# Patient Record
Sex: MALE | Race: WHITE | Employment: OTHER | URBAN - METROPOLITAN AREA
[De-identification: names, ages, dates, MRNs, and addresses within clinical notes are randomized per-mention and may not be internally consistent; named-entity substitution may affect disease eponyms.]

---

## 2017-12-20 ENCOUNTER — HOSPITAL ENCOUNTER (OUTPATIENT)
Dept: GENERAL RADIOLOGY | Age: 77
Discharge: HOME | End: 2017-12-20

## 2017-12-20 DIAGNOSIS — J40 BRONCHITIS: ICD-10-CM

## 2017-12-20 PROCEDURE — 71020 XR CHEST PA LAT: CPT

## 2018-01-29 ENCOUNTER — HOSPITAL ENCOUNTER (OUTPATIENT)
Dept: PREADMISSION TESTING | Age: 78
Discharge: HOME | End: 2018-01-29
Payer: MEDICARE

## 2018-01-29 VITALS
OXYGEN SATURATION: 98 % | DIASTOLIC BLOOD PRESSURE: 81 MMHG | HEIGHT: 65 IN | HEART RATE: 57 BPM | SYSTOLIC BLOOD PRESSURE: 156 MMHG | BODY MASS INDEX: 26.99 KG/M2 | TEMPERATURE: 98.4 F | WEIGHT: 162 LBS

## 2018-01-29 LAB
ABO + RH BLD: NORMAL
ANION GAP SERPL CALC-SCNC: 9 MMOL/L (ref 5–15)
APPEARANCE UR: CLEAR
ATRIAL RATE: 62 BPM
BACTERIA URNS QL MICRO: NEGATIVE /HPF
BILIRUB UR QL: NEGATIVE
BLOOD GROUP ANTIBODIES SERPL: NORMAL
BUN SERPL-MCNC: 18 MG/DL (ref 6–20)
BUN/CREAT SERPL: 14 (ref 12–20)
CALCIUM SERPL-MCNC: 9.6 MG/DL (ref 8.5–10.1)
CALCULATED P AXIS, ECG09: 53 DEGREES
CALCULATED R AXIS, ECG10: 44 DEGREES
CALCULATED T AXIS, ECG11: 51 DEGREES
CHLORIDE SERPL-SCNC: 105 MMOL/L (ref 97–108)
CO2 SERPL-SCNC: 28 MMOL/L (ref 21–32)
COLOR UR: NORMAL
CREAT SERPL-MCNC: 1.3 MG/DL (ref 0.7–1.3)
DIAGNOSIS, 93000: NORMAL
EPITH CASTS URNS QL MICRO: NORMAL /LPF
ERYTHROCYTE [DISTWIDTH] IN BLOOD BY AUTOMATED COUNT: 12.6 % (ref 11.5–14.5)
EST. AVERAGE GLUCOSE BLD GHB EST-MCNC: 114 MG/DL
GLUCOSE SERPL-MCNC: 95 MG/DL (ref 65–100)
GLUCOSE UR STRIP.AUTO-MCNC: NEGATIVE MG/DL
HBA1C MFR BLD: 5.6 % (ref 4.2–6.3)
HCT VFR BLD AUTO: 48.4 % (ref 36.6–50.3)
HGB BLD-MCNC: 16.6 G/DL (ref 12.1–17)
HGB UR QL STRIP: NEGATIVE
HYALINE CASTS URNS QL MICRO: NORMAL /LPF (ref 0–5)
INR PPP: 1.1 (ref 0.9–1.1)
KETONES UR QL STRIP.AUTO: NEGATIVE MG/DL
LEUKOCYTE ESTERASE UR QL STRIP.AUTO: NEGATIVE
MCH RBC QN AUTO: 31.6 PG (ref 26–34)
MCHC RBC AUTO-ENTMCNC: 34.3 G/DL (ref 30–36.5)
MCV RBC AUTO: 92.2 FL (ref 80–99)
NITRITE UR QL STRIP.AUTO: NEGATIVE
NRBC # BLD: 0 K/UL (ref 0–0.01)
NRBC BLD-RTO: 0 PER 100 WBC
P-R INTERVAL, ECG05: 160 MS
PH UR STRIP: 6 [PH] (ref 5–8)
PLATELET # BLD AUTO: 170 K/UL (ref 150–400)
PMV BLD AUTO: 10.7 FL (ref 8.9–12.9)
POTASSIUM SERPL-SCNC: 4 MMOL/L (ref 3.5–5.1)
PROT UR STRIP-MCNC: NEGATIVE MG/DL
PROTHROMBIN TIME: 10.8 SEC (ref 9–11.1)
Q-T INTERVAL, ECG07: 420 MS
QRS DURATION, ECG06: 90 MS
QTC CALCULATION (BEZET), ECG08: 426 MS
RBC # BLD AUTO: 5.25 M/UL (ref 4.1–5.7)
RBC #/AREA URNS HPF: NORMAL /HPF (ref 0–5)
SODIUM SERPL-SCNC: 142 MMOL/L (ref 136–145)
SP GR UR REFRACTOMETRY: 1.02 (ref 1–1.03)
SPECIMEN EXP DATE BLD: NORMAL
UA: UC IF INDICATED,UAUC: NORMAL
UROBILINOGEN UR QL STRIP.AUTO: 0.2 EU/DL (ref 0.2–1)
VENTRICULAR RATE, ECG03: 62 BPM
WBC # BLD AUTO: 4.8 K/UL (ref 4.1–11.1)
WBC URNS QL MICRO: NORMAL /HPF (ref 0–4)

## 2018-01-29 PROCEDURE — 86900 BLOOD TYPING SEROLOGIC ABO: CPT | Performed by: ORTHOPAEDIC SURGERY

## 2018-01-29 PROCEDURE — 85610 PROTHROMBIN TIME: CPT | Performed by: ORTHOPAEDIC SURGERY

## 2018-01-29 PROCEDURE — 83036 HEMOGLOBIN GLYCOSYLATED A1C: CPT | Performed by: ORTHOPAEDIC SURGERY

## 2018-01-29 PROCEDURE — 81001 URINALYSIS AUTO W/SCOPE: CPT | Performed by: ORTHOPAEDIC SURGERY

## 2018-01-29 PROCEDURE — 93005 ELECTROCARDIOGRAM TRACING: CPT

## 2018-01-29 PROCEDURE — 80048 BASIC METABOLIC PNL TOTAL CA: CPT | Performed by: ORTHOPAEDIC SURGERY

## 2018-01-29 PROCEDURE — 85027 COMPLETE CBC AUTOMATED: CPT | Performed by: ORTHOPAEDIC SURGERY

## 2018-01-29 NOTE — PERIOP NOTES
PRE-OPERATIVE INSTRUCTIONS REVIEWED WITH PATIENT. PATIENT GIVEN TWO  SIX-PACKS OF CHG WIPES. INSTRUCTIONS TO BE REVIEWED IN CLASS    ON USE OF CHG WIPES. PATIENT GIVEN SSI INFECTION SHEET. MRSA/MSSA TREATMENT INSTRUCTION SHEET GIVEN WITH AN EXPLANATION TO PATIENT THAT THEY WILL BE NOTIFIED IF TREATMENT INSTRUCTIONS NEED TO BE INITIATED. PATIENT WAS GIVEN THE OPPORTUNITY TO ASK QUESTIONS ON THE INFORMATION PROVIDED. PT HAS COPY OF \"[facility] [facility]\" FOR TOTAL KNEE. HE PLANS TO ATTEND JOINT CLASS TODAY.

## 2018-01-30 LAB
BACTERIA SPEC CULT: NORMAL
BACTERIA SPEC CULT: NORMAL
SERVICE CMNT-IMP: NORMAL

## 2018-02-09 RX ORDER — PREGABALIN 75 MG/1
75 CAPSULE ORAL ONCE
Status: CANCELLED | OUTPATIENT
Start: 2018-02-09 | End: 2018-02-09

## 2018-02-09 RX ORDER — CELECOXIB 200 MG/1
200 CAPSULE ORAL ONCE
Status: CANCELLED | OUTPATIENT
Start: 2018-02-09 | End: 2018-02-09

## 2018-02-09 RX ORDER — CEFAZOLIN SODIUM/WATER 2 G/20 ML
2 SYRINGE (ML) INTRAVENOUS ONCE
Status: CANCELLED | OUTPATIENT
Start: 2018-02-13 | End: 2018-02-13

## 2018-02-09 RX ORDER — ACETAMINOPHEN 500 MG
1000 TABLET ORAL ONCE
Status: CANCELLED | OUTPATIENT
Start: 2018-02-09 | End: 2018-02-09

## 2018-02-09 RX ORDER — DEXAMETHASONE SODIUM PHOSPHATE 10 MG/ML
4 INJECTION INTRAMUSCULAR; INTRAVENOUS ONCE
Status: CANCELLED | OUTPATIENT
Start: 2018-02-09 | End: 2018-02-10

## 2018-02-13 ENCOUNTER — ANESTHESIA (OUTPATIENT)
Dept: SURGERY | Age: 78
DRG: 470 | End: 2018-02-13
Payer: MEDICARE

## 2018-02-13 ENCOUNTER — ANESTHESIA EVENT (OUTPATIENT)
Dept: SURGERY | Age: 78
DRG: 470 | End: 2018-02-13
Payer: MEDICARE

## 2018-02-13 ENCOUNTER — HOSPITAL ENCOUNTER (INPATIENT)
Age: 78
LOS: 1 days | Discharge: HOME HEALTH | DRG: 470 | End: 2018-02-15
Attending: ORTHOPAEDIC SURGERY | Admitting: ORTHOPAEDIC SURGERY
Payer: MEDICARE

## 2018-02-13 DIAGNOSIS — M17.11 PRIMARY LOCALIZED OSTEOARTHRITIS OF RIGHT KNEE: Primary | ICD-10-CM

## 2018-02-13 LAB
ABO + RH BLD: NORMAL
BLOOD GROUP ANTIBODIES SERPL: NORMAL
GLUCOSE BLD STRIP.AUTO-MCNC: 94 MG/DL (ref 65–100)
SERVICE CMNT-IMP: NORMAL
SPECIMEN EXP DATE BLD: NORMAL

## 2018-02-13 PROCEDURE — 99218 HC RM OBSERVATION: CPT

## 2018-02-13 PROCEDURE — 76060000035 HC ANESTHESIA 2 TO 2.5 HR: Performed by: ORTHOPAEDIC SURGERY

## 2018-02-13 PROCEDURE — 76010000171 HC OR TIME 2 TO 2.5 HR INTENSV-TIER 1: Performed by: ORTHOPAEDIC SURGERY

## 2018-02-13 PROCEDURE — 74011250636 HC RX REV CODE- 250/636: Performed by: ORTHOPAEDIC SURGERY

## 2018-02-13 PROCEDURE — 77030020782 HC GWN BAIR PAWS FLX 3M -B

## 2018-02-13 PROCEDURE — 77030007866 HC KT SPN ANES BBMI -B: Performed by: ANESTHESIOLOGY

## 2018-02-13 PROCEDURE — 77030006802 HC BLD SAW RECIP BRSM -B: Performed by: ORTHOPAEDIC SURGERY

## 2018-02-13 PROCEDURE — 77030013079 HC BLNKT BAIR HGGR 3M -A: Performed by: ANESTHESIOLOGY

## 2018-02-13 PROCEDURE — 77030012935 HC DRSG AQUACEL BMS -B: Performed by: ORTHOPAEDIC SURGERY

## 2018-02-13 PROCEDURE — C1713 ANCHOR/SCREW BN/BN,TIS/BN: HCPCS | Performed by: ORTHOPAEDIC SURGERY

## 2018-02-13 PROCEDURE — 86900 BLOOD TYPING SEROLOGIC ABO: CPT | Performed by: ORTHOPAEDIC SURGERY

## 2018-02-13 PROCEDURE — 77030033067 HC SUT PDO STRATFX SPIR J&J -B: Performed by: ORTHOPAEDIC SURGERY

## 2018-02-13 PROCEDURE — 76210000006 HC OR PH I REC 0.5 TO 1 HR: Performed by: ORTHOPAEDIC SURGERY

## 2018-02-13 PROCEDURE — C1776 JOINT DEVICE (IMPLANTABLE): HCPCS | Performed by: ORTHOPAEDIC SURGERY

## 2018-02-13 PROCEDURE — 74011000250 HC RX REV CODE- 250

## 2018-02-13 PROCEDURE — 74011250636 HC RX REV CODE- 250/636: Performed by: ANESTHESIOLOGY

## 2018-02-13 PROCEDURE — C9290 INJ, BUPIVACAINE LIPOSOME: HCPCS | Performed by: ORTHOPAEDIC SURGERY

## 2018-02-13 PROCEDURE — 77030002933 HC SUT MCRYL J&J -A: Performed by: ORTHOPAEDIC SURGERY

## 2018-02-13 PROCEDURE — 97161 PT EVAL LOW COMPLEX 20 MIN: CPT

## 2018-02-13 PROCEDURE — 77030014077 HC TOWER MX CEM J&J -C: Performed by: ORTHOPAEDIC SURGERY

## 2018-02-13 PROCEDURE — 77030016547 HC BLD SAW SAG1 STRY -B: Performed by: ORTHOPAEDIC SURGERY

## 2018-02-13 PROCEDURE — 77030018836 HC SOL IRR NACL ICUM -A: Performed by: ORTHOPAEDIC SURGERY

## 2018-02-13 PROCEDURE — 77030011640 HC PAD GRND REM COVD -A: Performed by: ORTHOPAEDIC SURGERY

## 2018-02-13 PROCEDURE — 74011250636 HC RX REV CODE- 250/636

## 2018-02-13 PROCEDURE — 74011250637 HC RX REV CODE- 250/637: Performed by: ORTHOPAEDIC SURGERY

## 2018-02-13 PROCEDURE — 77030000032 HC CUF TRNQT ZIMM -B: Performed by: ORTHOPAEDIC SURGERY

## 2018-02-13 PROCEDURE — 36415 COLL VENOUS BLD VENIPUNCTURE: CPT | Performed by: ORTHOPAEDIC SURGERY

## 2018-02-13 PROCEDURE — 77030020365 HC SOL INJ SOD CL 0.9% 50ML: Performed by: ORTHOPAEDIC SURGERY

## 2018-02-13 PROCEDURE — 74011000258 HC RX REV CODE- 258: Performed by: ORTHOPAEDIC SURGERY

## 2018-02-13 PROCEDURE — 74011000258 HC RX REV CODE- 258

## 2018-02-13 PROCEDURE — 77030018822 HC SLV COMPR FT COVD -B

## 2018-02-13 PROCEDURE — 77030003601 HC NDL NRV BLK BBMI -A

## 2018-02-13 PROCEDURE — 77030010507 HC ADH SKN DERMBND J&J -B: Performed by: ORTHOPAEDIC SURGERY

## 2018-02-13 PROCEDURE — 77030020788: Performed by: ORTHOPAEDIC SURGERY

## 2018-02-13 PROCEDURE — 0SRC0L9 REPLACEMENT OF RIGHT KNEE JOINT WITH MEDIAL UNICONDYLAR SYNTHETIC SUBSTITUTE, CEMENTED, OPEN APPROACH: ICD-10-PCS | Performed by: ORTHOPAEDIC SURGERY

## 2018-02-13 PROCEDURE — 97116 GAIT TRAINING THERAPY: CPT

## 2018-02-13 PROCEDURE — 77030005515 HC CATH URETH FOL14 BARD -B: Performed by: ORTHOPAEDIC SURGERY

## 2018-02-13 PROCEDURE — 74011000250 HC RX REV CODE- 250: Performed by: ORTHOPAEDIC SURGERY

## 2018-02-13 PROCEDURE — 77030031139 HC SUT VCRL2 J&J -A: Performed by: ORTHOPAEDIC SURGERY

## 2018-02-13 PROCEDURE — 82962 GLUCOSE BLOOD TEST: CPT

## 2018-02-13 DEVICE — IMPLANTABLE DEVICE: Type: IMPLANTABLE DEVICE | Site: KNEE | Status: FUNCTIONAL

## 2018-02-13 DEVICE — COMPONENT TOT KNEE UPLR FEM PART: Type: IMPLANTABLE DEVICE | Status: FUNCTIONAL

## 2018-02-13 DEVICE — CEMENT BNE GENTAMICIN 40 GM SMARTSET GMV: Type: IMPLANTABLE DEVICE | Site: KNEE | Status: FUNCTIONAL

## 2018-02-13 RX ORDER — MIDAZOLAM HYDROCHLORIDE 1 MG/ML
0.5 INJECTION, SOLUTION INTRAMUSCULAR; INTRAVENOUS
Status: DISCONTINUED | OUTPATIENT
Start: 2018-02-13 | End: 2018-02-13 | Stop reason: HOSPADM

## 2018-02-13 RX ORDER — HYDROXYZINE HYDROCHLORIDE 10 MG/1
10 TABLET, FILM COATED ORAL
Status: DISCONTINUED | OUTPATIENT
Start: 2018-02-13 | End: 2018-02-15 | Stop reason: HOSPADM

## 2018-02-13 RX ORDER — AMOXICILLIN 250 MG
1 CAPSULE ORAL 2 TIMES DAILY
Status: DISCONTINUED | OUTPATIENT
Start: 2018-02-13 | End: 2018-02-15 | Stop reason: HOSPADM

## 2018-02-13 RX ORDER — OXYCODONE AND ACETAMINOPHEN 5; 325 MG/1; MG/1
1 TABLET ORAL AS NEEDED
Status: DISCONTINUED | OUTPATIENT
Start: 2018-02-13 | End: 2018-02-13 | Stop reason: HOSPADM

## 2018-02-13 RX ORDER — ASPIRIN 325 MG
325 TABLET, DELAYED RELEASE (ENTERIC COATED) ORAL 2 TIMES DAILY
Status: DISCONTINUED | OUTPATIENT
Start: 2018-02-13 | End: 2018-02-14

## 2018-02-13 RX ORDER — SODIUM CHLORIDE 0.9 % (FLUSH) 0.9 %
5-10 SYRINGE (ML) INJECTION AS NEEDED
Status: DISCONTINUED | OUTPATIENT
Start: 2018-02-13 | End: 2018-02-13 | Stop reason: HOSPADM

## 2018-02-13 RX ORDER — OXYCODONE HYDROCHLORIDE 5 MG/1
10 TABLET ORAL
Status: DISCONTINUED | OUTPATIENT
Start: 2018-02-13 | End: 2018-02-14

## 2018-02-13 RX ORDER — POTASSIUM CHLORIDE 750 MG/1
40 TABLET, FILM COATED, EXTENDED RELEASE ORAL DAILY
Status: DISCONTINUED | OUTPATIENT
Start: 2018-02-14 | End: 2018-02-15 | Stop reason: HOSPADM

## 2018-02-13 RX ORDER — FENTANYL CITRATE 50 UG/ML
50 INJECTION, SOLUTION INTRAMUSCULAR; INTRAVENOUS AS NEEDED
Status: DISCONTINUED | OUTPATIENT
Start: 2018-02-13 | End: 2018-02-13 | Stop reason: HOSPADM

## 2018-02-13 RX ORDER — OXYCODONE HYDROCHLORIDE 5 MG/1
5 TABLET ORAL
Status: DISCONTINUED | OUTPATIENT
Start: 2018-02-13 | End: 2018-02-14

## 2018-02-13 RX ORDER — CEFAZOLIN SODIUM/WATER 2 G/20 ML
2 SYRINGE (ML) INTRAVENOUS EVERY 8 HOURS
Status: COMPLETED | OUTPATIENT
Start: 2018-02-13 | End: 2018-02-14

## 2018-02-13 RX ORDER — ACETAMINOPHEN 500 MG
500 TABLET ORAL
Status: DISCONTINUED | OUTPATIENT
Start: 2018-02-13 | End: 2018-02-15 | Stop reason: HOSPADM

## 2018-02-13 RX ORDER — CELECOXIB 200 MG/1
200 CAPSULE ORAL ONCE
Status: COMPLETED | OUTPATIENT
Start: 2018-02-13 | End: 2018-02-13

## 2018-02-13 RX ORDER — HYDROMORPHONE HYDROCHLORIDE 1 MG/ML
0.5 INJECTION, SOLUTION INTRAMUSCULAR; INTRAVENOUS; SUBCUTANEOUS
Status: ACTIVE | OUTPATIENT
Start: 2018-02-13 | End: 2018-02-14

## 2018-02-13 RX ORDER — SODIUM CHLORIDE 0.9 % (FLUSH) 0.9 %
5-10 SYRINGE (ML) INJECTION AS NEEDED
Status: DISCONTINUED | OUTPATIENT
Start: 2018-02-13 | End: 2018-02-15 | Stop reason: HOSPADM

## 2018-02-13 RX ORDER — SODIUM CHLORIDE, SODIUM LACTATE, POTASSIUM CHLORIDE, CALCIUM CHLORIDE 600; 310; 30; 20 MG/100ML; MG/100ML; MG/100ML; MG/100ML
125 INJECTION, SOLUTION INTRAVENOUS CONTINUOUS
Status: DISCONTINUED | OUTPATIENT
Start: 2018-02-13 | End: 2018-02-13 | Stop reason: HOSPADM

## 2018-02-13 RX ORDER — MIDAZOLAM HYDROCHLORIDE 1 MG/ML
1 INJECTION, SOLUTION INTRAMUSCULAR; INTRAVENOUS AS NEEDED
Status: DISCONTINUED | OUTPATIENT
Start: 2018-02-13 | End: 2018-02-13 | Stop reason: HOSPADM

## 2018-02-13 RX ORDER — SODIUM CHLORIDE, SODIUM LACTATE, POTASSIUM CHLORIDE, CALCIUM CHLORIDE 600; 310; 30; 20 MG/100ML; MG/100ML; MG/100ML; MG/100ML
INJECTION, SOLUTION INTRAVENOUS
Status: DISCONTINUED | OUTPATIENT
Start: 2018-02-13 | End: 2018-02-13 | Stop reason: HOSPADM

## 2018-02-13 RX ORDER — PROPOFOL 10 MG/ML
INJECTION, EMULSION INTRAVENOUS
Status: DISCONTINUED | OUTPATIENT
Start: 2018-02-13 | End: 2018-02-13 | Stop reason: HOSPADM

## 2018-02-13 RX ORDER — MIDAZOLAM HYDROCHLORIDE 1 MG/ML
INJECTION, SOLUTION INTRAMUSCULAR; INTRAVENOUS AS NEEDED
Status: DISCONTINUED | OUTPATIENT
Start: 2018-02-13 | End: 2018-02-13 | Stop reason: HOSPADM

## 2018-02-13 RX ORDER — LIDOCAINE HYDROCHLORIDE 10 MG/ML
0.1 INJECTION, SOLUTION EPIDURAL; INFILTRATION; INTRACAUDAL; PERINEURAL AS NEEDED
Status: DISCONTINUED | OUTPATIENT
Start: 2018-02-13 | End: 2018-02-13 | Stop reason: HOSPADM

## 2018-02-13 RX ORDER — CEFAZOLIN SODIUM/WATER 2 G/20 ML
2 SYRINGE (ML) INTRAVENOUS ONCE
Status: COMPLETED | OUTPATIENT
Start: 2018-02-13 | End: 2018-02-13

## 2018-02-13 RX ORDER — ACETAMINOPHEN 325 MG/1
325 TABLET ORAL
Status: DISCONTINUED | OUTPATIENT
Start: 2018-02-14 | End: 2018-02-15 | Stop reason: HOSPADM

## 2018-02-13 RX ORDER — SODIUM CHLORIDE 9 MG/ML
50 INJECTION, SOLUTION INTRAVENOUS CONTINUOUS
Status: DISCONTINUED | OUTPATIENT
Start: 2018-02-13 | End: 2018-02-13 | Stop reason: HOSPADM

## 2018-02-13 RX ORDER — ATORVASTATIN CALCIUM 10 MG/1
10 TABLET, FILM COATED ORAL DAILY
Status: DISCONTINUED | OUTPATIENT
Start: 2018-02-14 | End: 2018-02-13

## 2018-02-13 RX ORDER — HYDROCHLOROTHIAZIDE 25 MG/1
25 TABLET ORAL DAILY
Status: DISCONTINUED | OUTPATIENT
Start: 2018-02-14 | End: 2018-02-15 | Stop reason: HOSPADM

## 2018-02-13 RX ORDER — SODIUM CHLORIDE 9 MG/ML
125 INJECTION, SOLUTION INTRAVENOUS CONTINUOUS
Status: DISPENSED | OUTPATIENT
Start: 2018-02-13 | End: 2018-02-14

## 2018-02-13 RX ORDER — FENTANYL CITRATE 50 UG/ML
25 INJECTION, SOLUTION INTRAMUSCULAR; INTRAVENOUS
Status: DISCONTINUED | OUTPATIENT
Start: 2018-02-13 | End: 2018-02-13 | Stop reason: HOSPADM

## 2018-02-13 RX ORDER — ONDANSETRON 2 MG/ML
4 INJECTION INTRAMUSCULAR; INTRAVENOUS
Status: DISPENSED | OUTPATIENT
Start: 2018-02-13 | End: 2018-02-14

## 2018-02-13 RX ORDER — PREGABALIN 75 MG/1
75 CAPSULE ORAL ONCE
Status: COMPLETED | OUTPATIENT
Start: 2018-02-13 | End: 2018-02-13

## 2018-02-13 RX ORDER — SODIUM CHLORIDE 0.9 % (FLUSH) 0.9 %
5-10 SYRINGE (ML) INJECTION EVERY 8 HOURS
Status: DISCONTINUED | OUTPATIENT
Start: 2018-02-14 | End: 2018-02-15 | Stop reason: HOSPADM

## 2018-02-13 RX ORDER — FACIAL-BODY WIPES
10 EACH TOPICAL DAILY PRN
Status: DISCONTINUED | OUTPATIENT
Start: 2018-02-15 | End: 2018-02-15 | Stop reason: HOSPADM

## 2018-02-13 RX ORDER — DIPHENHYDRAMINE HYDROCHLORIDE 50 MG/ML
12.5 INJECTION, SOLUTION INTRAMUSCULAR; INTRAVENOUS AS NEEDED
Status: DISCONTINUED | OUTPATIENT
Start: 2018-02-13 | End: 2018-02-13 | Stop reason: HOSPADM

## 2018-02-13 RX ORDER — NALOXONE HYDROCHLORIDE 0.4 MG/ML
0.4 INJECTION, SOLUTION INTRAMUSCULAR; INTRAVENOUS; SUBCUTANEOUS AS NEEDED
Status: DISCONTINUED | OUTPATIENT
Start: 2018-02-13 | End: 2018-02-15 | Stop reason: HOSPADM

## 2018-02-13 RX ORDER — POLYETHYLENE GLYCOL 3350 17 G/17G
17 POWDER, FOR SOLUTION ORAL DAILY
Status: DISCONTINUED | OUTPATIENT
Start: 2018-02-14 | End: 2018-02-15 | Stop reason: HOSPADM

## 2018-02-13 RX ORDER — FENTANYL CITRATE 50 UG/ML
INJECTION, SOLUTION INTRAMUSCULAR; INTRAVENOUS AS NEEDED
Status: DISCONTINUED | OUTPATIENT
Start: 2018-02-13 | End: 2018-02-13 | Stop reason: HOSPADM

## 2018-02-13 RX ORDER — HYDROCHLOROTHIAZIDE 25 MG/1
25 TABLET ORAL DAILY
Status: DISCONTINUED | OUTPATIENT
Start: 2018-02-14 | End: 2018-02-13

## 2018-02-13 RX ORDER — SODIUM CHLORIDE 0.9 % (FLUSH) 0.9 %
5-10 SYRINGE (ML) INJECTION EVERY 8 HOURS
Status: DISCONTINUED | OUTPATIENT
Start: 2018-02-13 | End: 2018-02-13 | Stop reason: HOSPADM

## 2018-02-13 RX ORDER — KETOROLAC TROMETHAMINE 30 MG/ML
15 INJECTION, SOLUTION INTRAMUSCULAR; INTRAVENOUS EVERY 6 HOURS
Status: DISCONTINUED | OUTPATIENT
Start: 2018-02-13 | End: 2018-02-14

## 2018-02-13 RX ORDER — SODIUM CHLORIDE 9 MG/ML
1000 INJECTION, SOLUTION INTRAVENOUS CONTINUOUS
Status: DISCONTINUED | OUTPATIENT
Start: 2018-02-13 | End: 2018-02-13 | Stop reason: HOSPADM

## 2018-02-13 RX ORDER — MORPHINE SULFATE 10 MG/ML
2 INJECTION, SOLUTION INTRAMUSCULAR; INTRAVENOUS
Status: DISCONTINUED | OUTPATIENT
Start: 2018-02-13 | End: 2018-02-13 | Stop reason: HOSPADM

## 2018-02-13 RX ORDER — ONDANSETRON 2 MG/ML
4 INJECTION INTRAMUSCULAR; INTRAVENOUS AS NEEDED
Status: DISCONTINUED | OUTPATIENT
Start: 2018-02-13 | End: 2018-02-13 | Stop reason: HOSPADM

## 2018-02-13 RX ORDER — AMLODIPINE BESYLATE 5 MG/1
10 TABLET ORAL DAILY
Status: DISCONTINUED | OUTPATIENT
Start: 2018-02-14 | End: 2018-02-15 | Stop reason: HOSPADM

## 2018-02-13 RX ORDER — ACETAMINOPHEN 500 MG
1000 TABLET ORAL ONCE
Status: COMPLETED | OUTPATIENT
Start: 2018-02-13 | End: 2018-02-13

## 2018-02-13 RX ORDER — POTASSIUM CHLORIDE 750 MG/1
40 TABLET, FILM COATED, EXTENDED RELEASE ORAL DAILY
Status: DISCONTINUED | OUTPATIENT
Start: 2018-02-14 | End: 2018-02-13

## 2018-02-13 RX ADMIN — Medication 2 G: at 07:40

## 2018-02-13 RX ADMIN — PROPOFOL 25 MCG/KG/MIN: 10 INJECTION, EMULSION INTRAVENOUS at 07:46

## 2018-02-13 RX ADMIN — MIDAZOLAM HYDROCHLORIDE 0.5 MG: 1 INJECTION, SOLUTION INTRAMUSCULAR; INTRAVENOUS at 07:50

## 2018-02-13 RX ADMIN — PREGABALIN 75 MG: 75 CAPSULE ORAL at 07:08

## 2018-02-13 RX ADMIN — ACETAMINOPHEN 500 MG: 500 TABLET, FILM COATED ORAL at 17:20

## 2018-02-13 RX ADMIN — MIDAZOLAM HYDROCHLORIDE 1 MG: 1 INJECTION, SOLUTION INTRAMUSCULAR; INTRAVENOUS at 07:33

## 2018-02-13 RX ADMIN — SODIUM CHLORIDE 125 ML/HR: 900 INJECTION, SOLUTION INTRAVENOUS at 10:35

## 2018-02-13 RX ADMIN — SODIUM CHLORIDE, SODIUM LACTATE, POTASSIUM CHLORIDE, AND CALCIUM CHLORIDE 125 ML/HR: 600; 310; 30; 20 INJECTION, SOLUTION INTRAVENOUS at 07:11

## 2018-02-13 RX ADMIN — ACETAMINOPHEN 1000 MG: 500 TABLET ORAL at 07:08

## 2018-02-13 RX ADMIN — KETOROLAC TROMETHAMINE 15 MG: 30 INJECTION, SOLUTION INTRAMUSCULAR at 16:06

## 2018-02-13 RX ADMIN — MIDAZOLAM HYDROCHLORIDE 1 MG: 1 INJECTION, SOLUTION INTRAMUSCULAR; INTRAVENOUS at 07:40

## 2018-02-13 RX ADMIN — SODIUM CHLORIDE, SODIUM LACTATE, POTASSIUM CHLORIDE, CALCIUM CHLORIDE: 600; 310; 30; 20 INJECTION, SOLUTION INTRAVENOUS at 07:33

## 2018-02-13 RX ADMIN — CELECOXIB 200 MG: 200 CAPSULE ORAL at 07:00

## 2018-02-13 RX ADMIN — FENTANYL CITRATE 25 MCG: 50 INJECTION, SOLUTION INTRAMUSCULAR; INTRAVENOUS at 07:33

## 2018-02-13 RX ADMIN — MIDAZOLAM HYDROCHLORIDE 1 MG: 1 INJECTION, SOLUTION INTRAMUSCULAR; INTRAVENOUS at 07:37

## 2018-02-13 RX ADMIN — FENTANYL CITRATE 50 MCG: 50 INJECTION, SOLUTION INTRAMUSCULAR; INTRAVENOUS at 07:22

## 2018-02-13 RX ADMIN — MIDAZOLAM HYDROCHLORIDE 2 MG: 1 INJECTION, SOLUTION INTRAMUSCULAR; INTRAVENOUS at 07:22

## 2018-02-13 RX ADMIN — DOCUSATE SODIUM AND SENNOSIDES 1 TABLET: 8.6; 5 TABLET, FILM COATED ORAL at 17:20

## 2018-02-13 RX ADMIN — SODIUM CHLORIDE, SODIUM LACTATE, POTASSIUM CHLORIDE, CALCIUM CHLORIDE: 600; 310; 30; 20 INJECTION, SOLUTION INTRAVENOUS at 08:51

## 2018-02-13 RX ADMIN — ACETAMINOPHEN 500 MG: 500 TABLET, FILM COATED ORAL at 22:53

## 2018-02-13 RX ADMIN — Medication 2 G: at 16:06

## 2018-02-13 RX ADMIN — ASPIRIN 325 MG: 325 TABLET, DELAYED RELEASE ORAL at 17:20

## 2018-02-13 RX ADMIN — KETOROLAC TROMETHAMINE 15 MG: 30 INJECTION, SOLUTION INTRAMUSCULAR at 22:53

## 2018-02-13 NOTE — ANESTHESIA PROCEDURE NOTES
Peripheral Block    Start time: 2/13/2018 7:24 AM  End time: 2/13/2018 7:30 AM  Performed by: TERESO Del Angel  Authorized by: TERESO Del Angel       Pre-procedure: Indications: at surgeon's request and post-op pain management    Preanesthetic Checklist: patient identified, risks and benefits discussed, site marked, timeout performed, anesthesia consent given and patient being monitored    Timeout Time: 07:24          Block Type:   Block Type:   Adductor canal  Laterality:  Right  Monitoring:  Standard ASA monitoring, continuous pulse ox, frequent vital sign checks, heart rate, responsive to questions and oxygen  Injection Technique:  Single shot  Procedures: ultrasound guided    Patient Position: supine  Prep: chlorhexidine    Location:  Mid thigh  Needle Type:  Stimuplex  Needle Gauge:  22 G  Needle Localization:  Ultrasound guidance  Medication Injected:  0.5%  ropivacaine  Volume (mL):  20    Assessment:  Number of attempts:  1  Injection Assessment:  Incremental injection every 5 mL, local visualized surrounding nerve on ultrasound, negative aspiration for blood, no paresthesia, no intravascular symptoms and ultrasound image on chart  Patient tolerance:  Patient tolerated the procedure well with no immediate complications

## 2018-02-13 NOTE — OP NOTES
[facility] OP NOTE    Jacklyn Sin  MR#: [id number]  : 1940  ACCOUNT #: [de-identified]   DATE OF SERVICE: 2018    SURGEON: Eladio Leal MD    ASSISTANT:  Dinora Shaw SA    PREOPERATIVE DIAGNOSIS:  Medial compartment osteoarthritis of the right knee. POSTOPERATIVE DIAGNOSIS:  Medial compartment osteoarthritis of the right knee. PROCEDURE PERFORMED: Right medial unicompartmental knee replacement. IMPLANTS: DePuy Sigma HP size 2 medial femur, size 4 tibial tray with 7 mm polyethylene insert. ANESTHESIA: Spinal sedation as well as adductor canal block. COMPLICATIONS:  None. ESTIMATED BLOOD LOSS:  50 mL. SPECIMENS REMOVED: None. INDICATIONS:  The patient is a 69-year-old gentleman with progressive right medial knee pain due to severe medial compartment arthritis. Symptoms have progressed despite comprehensive conservative treatment and he presents for right medial unicompartmental knee replacement versus total knee replacement. Risks, benefits, alternatives of procedure were reviewed with him in detail and he desires to proceed. PROCEDURE IN DETAIL:  Anesthesia team placed an adductor canal block before taking the patient to the operating room. We also placed a spinal. Preoperative IV antibiotics were administered. A Anders catheter was inserted and a padded pneumatic tourniquet was placed around the right upper thigh. Right lower extremity was prepped and draped in usual sterile fashion. Tourniquet was inflated to 275. Through a midline anterior knee incision from the medial parapatellar arthrotomy, the patella had grade I articular changes on the distal one-half. Femoral trochlea was completely normal.  The visualized portion on the lateral compartment was normal.  We proceeded with a medial unicompartmental replacement. Neutral proximal tibial resection was performed with an extramedullary alignment guide.   Care was taken to match the patient's native posterior slope. Using spacer blocks, flexion gap was satisfactory with the 7 mm block, and extension gap was satisfactory with an 8 mm block. I made the distal femoral cutting block with appropriate jig, under resecting distally by a millimeter. Femur was sized to a 2 and rotation for the cutting jig was based off of articulation with the center of the medial tibia in both 90 degrees of flexion and full extension. Femoral preparation was completed. Femoral trial was inserted and again the spacer block was utilized, satisfactory soft tissue tension and stability with a 7 mm spacer block in both flexion and extension, the knee had full extension to gravity. Remaining femoral osteophytes were removed. Meniscus had already been excised. Periarticular soft tissues were injected with a solution containing Exparel 0.5% Marcaine with epinephrine as well as morphine and Toradol. Tibial preparation was completed and bony surfaces were copiously irrigated with pulse lavage and dried before the real components were cemented into place using antibiotic-impregnated cement. Excess cement was removed with care taken to remove all posterior cement. Knee was reduced in full extension with the real insert in place. After adequate setup time, tourniquet was released and hemostasis obtained with the Bovie. The wound was irrigated. Arthrotomy was closed with a combination of heavy Vicryl sutures and a running #2 Stratafix suture. Skin and subcutaneous layers were closed in layered fashion with Vicryl and a running Monocryl subcuticular stitch. The wound was dressed with Dermabond and an Aquacel occlusive dressing as well as a sterile compressive dressing. The patient was transported to the postanesthesia care unit in stable condition. All counts were correct at the end of the procedure.       MD Lena Bonilla / GUI  D: 02/13/2018 09:38     T: 02/13/2018 12:19  JOB #: [id number]  CC: Zoltan Nam MD  CC: Surendra Bradford MD

## 2018-02-13 NOTE — PROGRESS NOTES
Bedside and Verbal shift change report given to Nicholas Kim (oncoming nurse) by Raymundo Macdonald (offgoing nurse). Report included the following information SBAR, Intake/Output and MAR.

## 2018-02-13 NOTE — ANESTHESIA PREPROCEDURE EVALUATION
Anesthetic History   No history of anesthetic complications            Review of Systems / Medical History  Patient summary reviewed, nursing notes reviewed and pertinent labs reviewed    Pulmonary  Within defined limits                 Neuro/Psych   Within defined limits           Cardiovascular  Within defined limits  Hypertension                   GI/Hepatic/Renal  Within defined limits              Endo/Other  Within defined limits           Other Findings              Physical Exam    Airway  Mallampati: II  TM Distance: > 6 cm  Neck ROM: normal range of motion   Mouth opening: Normal     Cardiovascular  Regular rate and rhythm,  S1 and S2 normal,  no murmur, click, rub, or gallop             Dental  No notable dental hx       Pulmonary  Breath sounds clear to auscultation               Abdominal  GI exam deferred       Other Findings            Anesthetic Plan    ASA: 2  Anesthesia type: spinal      Post-op pain plan if not by surgeon: peripheral nerve block single    Induction: Intravenous  Anesthetic plan and risks discussed with: Patient

## 2018-02-13 NOTE — DISCHARGE INSTRUCTIONS
After Hospital Care Plan:  Discharge Instructions Knee Replacement- Dr. Bebe Spatz    Patient Name: Renard Benson  Date of procedure: 2/13/2018   Procedure: Procedure(s):  RIGHT MEDIAL UNICOMPARTMENTAL KNEE ARTHROPLASTY  (SPINAL W/IVS)  Surgeon: Alfredo Mejia) and Role:     * Estuardo Holland MD - Primary  PCP: Chuy Lr MD  Date of discharge: No discharge date for patient encounter. Follow up appointments   Follow up with Dr. Bebe Spatz in 4 weeks. Call [phone] to make an appointment.  If home health has been arranged for you the agency will contact you to arrange dates/times for visits. Please call them if you do not hear from them within 24 hours after you are discharged    When to call your Orthopaedic Surgeon: Call [phone]. If you call after 5pm or on a weekend, the on call physician will be contacted   Unrelieved pain   Signs of infection-if your incision is red; continues to have drainage; drainage has a foul odor or if you have a persistent fever over 101 degrees   Signs of a blood clot in your leg-calf pain, tenderness, redness, swelling of lower leg    When to call your Primary Care Physician:   Concerns about medical conditions such as diabetes, high blood pressure, asthma, congestive heart failure   Call if blood sugars are elevated, persistent headache or dizziness, coughing or congestion, constipation or diarrhea, burning with urination, abnormal heart rate    When to call [id number] go to the nearest emergency room   Acute onset of chest pain, shortness of breath, difficulty breathing      Activity   Weight bearing as tolerated with walker or crutches.  Refer to pages 23-31 of your handbook for instructions and pictures   Complete your Home Exercise Program daily as instructed by your therapist.  Refer to pages 33-41 of your handbook for instructions and pictures   Get up every one hour and walk (except at night when sleeping)   Do not drive or operate heavy machinery      Incision Care   The Aquacel (brown, waterproof) surgical dressing is to remain on your knee for 7 days. On the 7th day have someone gently peel the dressing off by carefully lifting the edge and stretching it slightly to break the adhesive seal   If you have steri-strips (small, white pieces of tape) on your incision, they may come off when you remove the Aquacel surgical dressing. This is okay. You may now leave your incision open to air   If your Aquacel dressing comes loose/off before the 7th day, you may replace it with a dry sterile gauze dressing; change it daily. Once you incision is not draining, you may leave it open to air   You may take a shower with the Aquacel dressing in place. Once the Aquacel is removed, you may shower and get your incision wet but do not submerge your incision under water in a bath tub, hot tub or swimming pool for 6 weeks after surgery. Preventing blood clots    Take Enteric coated Aspirin (delayed release) one tablet twice a day for one month following surgery    Pain management   Take pain medication as prescribed with food & fluids; decrease the amount you use as your pain lessens   Avoid alcoholic beverages while taking pain medication   Please be aware that many medications contain Tylenol. We do not want you to over medicate so please read the information below as a guide. Do not take more than 3 Grams of Tylenol in a 24 hour period.   (There are 1000 milligrams in one Gram)   o Tylenol 325 mg per tablet (do not take more than 9 tablets in 24 hours)  o Tylenol 500 mg per tablet (do not take more than 6 tablets in 24 hours)  o Tylenol 650 mg per tablet (do not take more than 4 tablets in 24 hours)   Elevate your leg (do not bend/flex knee) and place ice bags on your knee for 15-20 minutes after exercising and as needed for comfort    Diet   Resume usual diet; drink plenty of fluids; eat foods high in fiber   You may want to take a stool softener (such as Senokot-S or Colace) to prevent constipation while you are taking pain medication. If constipation occurs, take a laxative (such as Dulcolax tablets, Milk of Magnesia, or a suppository)      [facility] Protocol (to be followed by [address])  Nursing-per physicians order   Complete head to toe assessment, vital signs   Medication reconciliation   Review pain management   Manage chronic medical conditions    Physical Therapy-per physicians order  Weight bearing status:  Precautions at Admission: Fall, WBAT          Mobility Status:  Supine to Sit: Modified independent  Sit to Stand: Stand-by asssistance  Sit to Supine: Stand-by asssistance     Gait:  Distance (ft): 250 Feet (ft)  Ambulation - Level of Assistance: Stand-by asssistance  Assistive Device: Walker, rolling, Gait belt  Gait Abnormalities: Antalgic, Decreased step clearance    Physical Therapy   Assessment and evaluation-bed mobility; functional transfers (bed, chair, bathroom, stairs); ambulation with equipment, car transfers, shower transfers, safety and ability to get out of house in the event of an emergency   Review weight bearing as tolerated, wean from walker or crutches as tolerated   Discuss pain management   Review how to do ADLs. Refer to page 42 of patient handbook    Home Exercise Program-refer to pages 33-41 of patient handbook for exercises.

## 2018-02-13 NOTE — IP AVS SNAPSHOT
[address] [facility] [address] [place] [address] 
[phone] Patient: Gianni Turcios MRN: [id number] :1940 About your hospitalization You were admitted on:  2018 You last received care in the:  [id number] [facility] You were discharged on:  February 15, 2018 Why you were hospitalized Your primary diagnosis was:  Not on File Your diagnoses also included:  Primary Localized Osteoarthritis Of Right Knee Follow-up Information Follow up With Details Comments Contact Info AT HOME CARE On 2/15/2018 skilled homecare for therapy [address] [place] [id number] 
[phone] Eladia Elizabeth MD   [facility] [address] [address] 
[phone] Discharge Orders None A check ebenezer indicates which time of day the medication should be taken. My Medications START taking these medications Instructions Each Dose to Equal  
 Morning Noon Evening Bedtime  
 acetaminophen 500 mg tablet Commonly known as:  TYLENOL Your last dose was: Your next dose is: Take 1 Tab by mouth every four (4) hours (while awake). Schedule for pain control over the next 7-14 days. Do not exceed 3000 mg in 24 hours. Obtain over-the-counter. Indications: Postoperative Incisional Knee Pain 500 mg  
    
   
   
   
  
 diclofenac EC 50 mg EC tablet Commonly known as:  VOLTAREN Your last dose was: Your next dose is: Take 1 Tab by mouth two (2) times daily as needed. Indications: OSTEOARTHRITIS, Postoperative Incisional Knee Pain 50 mg  
    
   
   
   
  
 oxyCODONE IR 5 mg immediate release tablet Commonly known as:  Carole Dirk Your last dose was: Your next dose is: Take 0.5-1 Tabs by mouth every four (4) hours as needed (Severe pain not relieved by tramadol (Ultram). Take with food & fluids. ).  Max Daily Amount: 30 mg. Indications: Severe Incisional Knee Pain 2.5-5 mg  
    
   
   
   
  
 traMADol 50 mg tablet Commonly known as:  ULTRAM  
   
Your last dose was: Your next dose is: Take 0.5-1 Tabs by mouth every six (6) hours as needed. Max Daily Amount: 200 mg. Indications: Postoperative Incisional Knee Pain 25-50 mg CHANGE how you take these medications Instructions Each Dose to Equal  
 Morning Noon Evening Bedtime  
 aspirin delayed-release 81 mg tablet Commonly known as:  [id number] What changed:   
- when to take this 
- additional instructions Your last dose was: Your next dose is: Take 1 Tab by mouth every twelve (12) hours. Take either Enteric Coated of Delayed Release Aspirin. May obtain over-the-counter. Indications: Prevention of Blood Clots after Total Knee Replacement 81 mg CONTINUE taking these medications Instructions Each Dose to Equal  
 Morning Noon Evening Bedtime  
 atorvastatin 10 mg tablet Commonly known as:  LIPITOR Your last dose was: Your next dose is: Take 10 mg by mouth daily. 10 mg  
    
   
   
   
  
 hydroCHLOROthiazide 25 mg tablet Commonly known as:  HYDRODIURIL Your last dose was: Your next dose is: Take 25 mg by mouth daily. 25 mg  
    
   
   
   
  
 NORVASC 10 mg tablet Generic drug:  amLODIPine Your last dose was: Your next dose is: Take 10 mg by mouth daily. 10 mg  
    
   
   
   
  
 potassium chloride 20 mEq tablet Commonly known as:  K-DUR, KLOR-CON Your last dose was: Your next dose is: Take 40 mEq by mouth daily. 40 mEq Where to Get Your Medications Information on where to get these meds will be given to you by the nurse or doctor. ! Ask your nurse or doctor about these medications  
  acetaminophen 500 mg tablet  
 aspirin delayed-release 81 mg tablet  
 diclofenac EC 50 mg EC tablet  
 oxyCODONE IR 5 mg immediate release tablet  
 traMADol 50 mg tablet Discharge Instructions After Hospital Care Plan:  Discharge Instructions Knee Replacement- Dr. Radha Brush Patient Name: Radha Shelton Date of procedure: 2/13/2018 Procedure: Procedure(s): RIGHT MEDIAL UNICOMPARTMENTAL KNEE ARTHROPLASTY  (SPINAL W/IVS) Surgeon: Surgeon(s) and Role: Thomas Sheppard MD - Primary PCP: Bonnie Barriga MD 
Date of discharge: No discharge date for patient encounter. Follow up appointments ? Follow up with Dr. Radha Brush in 4 weeks. Call [phone] to make an appointment. ? If home health has been arranged for you the agency will contact you to arrange dates/times for visits. Please call them if you do not hear from them within 24 hours after you are discharged When to call your Orthopaedic Surgeon: Call [phone]. If you call after 5pm or on a weekend, the on call physician will be contacted ? Unrelieved pain ? Signs of infection-if your incision is red; continues to have drainage; drainage has a foul odor or if you have a persistent fever over 101 degrees ? Signs of a blood clot in your leg-calf pain, tenderness, redness, swelling of lower leg When to call your Primary Care Physician: 
? Concerns about medical conditions such as diabetes, high blood pressure, asthma, congestive heart failure ? Call if blood sugars are elevated, persistent headache or dizziness, coughing or congestion, constipation or diarrhea, burning with urination, abnormal heart rate When to call [id number] go to the nearest emergency room ? Acute onset of chest pain, shortness of breath, difficulty breathing Activity ? Weight bearing as tolerated with walker or crutches. Refer to pages 23-31 of your handbook for instructions and pictures ? Complete your Home Exercise Program daily as instructed by your therapist.  Refer to pages 33-41 of your handbook for instructions and pictures ? Get up every one hour and walk (except at night when sleeping) ? Do not drive or operate heavy machinery Incision Care ? The Aquacel (brown, waterproof) surgical dressing is to remain on your knee for 7 days. On the 7th day have someone gently peel the dressing off by carefully lifting the edge and stretching it slightly to break the adhesive seal 
? If you have steri-strips (small, white pieces of tape) on your incision, they may come off when you remove the Aquacel surgical dressing. This is okay. You may now leave your incision open to air ? If your Aquacel dressing comes loose/off before the 7th day, you may replace it with a dry sterile gauze dressing; change it daily. Once you incision is not draining, you may leave it open to air ? You may take a shower with the Aquacel dressing in place. Once the Aquacel is removed, you may shower and get your incision wet but do not submerge your incision under water in a bath tub, hot tub or swimming pool for 6 weeks after surgery. Preventing blood clots ? Take Enteric coated Aspirin (delayed release) one tablet twice a day for one month following surgery Pain management ? Take pain medication as prescribed with food & fluids; decrease the amount you use as your pain lessens ? Avoid alcoholic beverages while taking pain medication ? Please be aware that many medications contain Tylenol. We do not want you to over medicate so please read the information below as a guide. Do not take more than 3 Grams of Tylenol in a 24 hour period. (There are 1000 milligrams in one Gram) o Tylenol 325 mg per tablet (do not take more than 9 tablets in 24 hours) o Tylenol 500 mg per tablet (do not take more than 6 tablets in 24 hours) o Tylenol 650 mg per tablet (do not take more than 4 tablets in 24 hours) ? Elevate your leg (do not bend/flex knee) and place ice bags on your knee for 15-20 minutes after exercising and as needed for comfort Diet ? Resume usual diet; drink plenty of fluids; eat foods high in fiber ? You may want to take a stool softener (such as Senokot-S or Colace) to prevent constipation while you are taking pain medication. If constipation occurs, take a laxative (such as Dulcolax tablets, Milk of Magnesia, or a suppository) Home Health Care Protocol (to be followed by [address]) Nursing-per physicians order ? Complete head to toe assessment, vital signs ? Medication reconciliation ? Review pain management ? Manage chronic medical conditions Physical Therapy-per physicians order Weight bearing status: 
Precautions at Admission: Fall, WBAT Mobility Status: 
Supine to Sit: Modified independent Sit to Stand: Stand-by asssistance Sit to Supine: Stand-by asssistance Gait: 
Distance (ft): 250 Feet (ft) Ambulation - Level of Assistance: Stand-by asssistance Assistive Device: Walker, rolling, Gait belt Gait Abnormalities: Antalgic, Decreased step clearance Physical Therapy ? Assessment and evaluation-bed mobility; functional transfers (bed, chair, bathroom, stairs); ambulation with equipment, car transfers, shower transfers, safety and ability to get out of house in the event of an emergency ? Review weight bearing as tolerated, wean from walker or crutches as tolerated ? Discuss pain management ? Review how to do ADLs. Refer to page 42 of patient handbook Home Exercise Program-refer to pages 33-41 of patient handbook for exercises. Introducing [facility]! [facility] introduces [employer] patient portal. Now you can access parts of your medical record, email your doctor's office, and request medication refills online. 1. In your internet browser, go to [email or web address] 2. Click on the First Time User? Click Here link in the Sign In box. You will see the New Member Sign Up page. 3. Enter your [employer] Access Code exactly as it appears below. You will not need to use this code after youve completed the sign-up process. If you do not sign up before the expiration date, you must request a new code. · [employer] Access Code: [facility] Expires: 4/29/2018  7:48 AM 
 
4. Enter the last four digits of your Social Security Number (xxxx) and Date of Birth (mm/dd/yyyy) as indicated and click Submit. You will be taken to the next sign-up page. 5. Create a [employer] ID. This will be your [employer] login ID and cannot be changed, so think of one that is secure and easy to remember. 6. Create a [employer] password. You can change your password at any time. 7. Enter your Password Reset Question and Answer. This can be used at a later time if you forget your password. 8. Enter your e-mail address. You will receive e-mail notification when new information is available in [address] 9. Click Sign Up. You can now view and download portions of your medical record. 10. Click the Download Summary menu link to download a portable copy of your medical information. If you have questions, please visit the Frequently Asked Questions section of the [employer] website. Remember, [employer] is NOT to be used for urgent needs. For medical emergencies, dial 911. Now available from your iPhone and Android! Providers Seen During Your Hospitalization Provider Specialty Primary office phone Roseanne Almeida MD Orthopedic Surgery [phone] Your Primary Care Physician (PCP) Primary Care Physician Office Phone Office Fax Karthikn beat [phone] You are allergic to the following Allergen Reactions Ace Inhibitors Cough Percocet (Oxycodone-Acetaminophen) Nausea and Vomiting \"I DON'T TOLERATE THE STRONG OPIODS\" Recent Documentation Height Weight BMI Smoking Status 1.651 m 73.5 kg 26.96 kg/m2 Never Smoker Emergency Contacts Name Discharge Info Relation Home Work Mobile Molly Pagan DISCHARGE CAREGIVER [3] Spouse [3] [phone] Patient Belongings The following personal items are in your possession at time of discharge: 
  Dental Appliances: None  Visual Aid: Glasses             Clothing:  (bag of clothing and eyeglasses returned to patient at bedsid)    Other Valuables: Eyeglasses Please provide this summary of care documentation to your next provider. Signatures-by signing, you are acknowledging that this After Visit Summary has been reviewed with you and you have received a copy. Patient Signature:  ____________________________________________________________ Date:  ____________________________________________________________  
  
[facility] Provider Signature:  ____________________________________________________________ Date:  ____________________________________________________________

## 2018-02-13 NOTE — IP AVS SNAPSHOT
[address] [facility] [address] 
[phone] Patient: Caty Rowland MRN: [id number] :1940 A check ebenezer indicates which time of day the medication should be taken. My Medications START taking these medications Instructions Each Dose to Equal  
 Morning Noon Evening Bedtime  
 acetaminophen 500 mg tablet Commonly known as:  TYLENOL Your last dose was: Your next dose is: Take 1 Tab by mouth every four (4) hours (while awake). Schedule for pain control over the next 7-14 days. Do not exceed 3000 mg in 24 hours. Obtain over-the-counter. Indications: Postoperative Incisional Knee Pain 500 mg  
    
   
   
   
  
 diclofenac EC 50 mg EC tablet Commonly known as:  VOLTAREN Your last dose was: Your next dose is: Take 1 Tab by mouth two (2) times daily as needed. Indications: OSTEOARTHRITIS, Postoperative Incisional Knee Pain 50 mg  
    
   
   
   
  
 oxyCODONE IR 5 mg immediate release tablet Commonly known as:  Lindsay Quispe Your last dose was: Your next dose is: Take 0.5-1 Tabs by mouth every four (4) hours as needed (Severe pain not relieved by tramadol (Ultram). Take with food & fluids. ). Max Daily Amount: 30 mg. Indications: Severe Incisional Knee Pain 2.5-5 mg  
    
   
   
   
  
 traMADol 50 mg tablet Commonly known as:  ULTRAM  
   
Your last dose was: Your next dose is: Take 0.5-1 Tabs by mouth every six (6) hours as needed. Max Daily Amount: 200 mg. Indications: Postoperative Incisional Knee Pain 25-50 mg CHANGE how you take these medications Instructions Each Dose to Equal  
 Morning Noon Evening Bedtime  
 aspirin delayed-release 81 mg tablet Commonly known as:  [id number] What changed:   
- when to take this 
- additional instructions Your last dose was: Your next dose is: Take 1 Tab by mouth every twelve (12) hours. Take either Enteric Coated of Delayed Release Aspirin. May obtain over-the-counter. Indications: Prevention of Blood Clots after Total Knee Replacement 81 mg CONTINUE taking these medications Instructions Each Dose to Equal  
 Morning Noon Evening Bedtime  
 atorvastatin 10 mg tablet Commonly known as:  LIPITOR Your last dose was: Your next dose is: Take 10 mg by mouth daily. 10 mg  
    
   
   
   
  
 hydroCHLOROthiazide 25 mg tablet Commonly known as:  HYDRODIURIL Your last dose was: Your next dose is: Take 25 mg by mouth daily. 25 mg  
    
   
   
   
  
 NORVASC 10 mg tablet Generic drug:  amLODIPine Your last dose was: Your next dose is: Take 10 mg by mouth daily. 10 mg  
    
   
   
   
  
 potassium chloride 20 mEq tablet Commonly known as:  K-DUR, KLOR-CON Your last dose was: Your next dose is: Take 40 mEq by mouth daily. 40 mEq Where to Get Your Medications Information on where to get these meds will be given to you by the nurse or doctor. ! Ask your nurse or doctor about these medications  
  acetaminophen 500 mg tablet  
 aspirin delayed-release 81 mg tablet  
 diclofenac EC 50 mg EC tablet  
 oxyCODONE IR 5 mg immediate release tablet  
 traMADol 50 mg tablet

## 2018-02-13 NOTE — PROGRESS NOTES
TRANSFER - IN REPORT:    Verbal report received from Phuong(name) on [address]  being received from [facility]) for routine post - op      Report consisted of patients Situation, Background, Assessment and   Recommendations(SBAR). Information from the following report(s) SBAR, Intake/Output and MAR was reviewed with the receiving nurse. Opportunity for questions and clarification was provided. Assessment completed upon patients arrival to unit and care assumed.

## 2018-02-13 NOTE — PROGRESS NOTES
Problem: Mobility Impaired (Adult and Pediatric)  Goal: *Acute Goals and Plan of Care (Insert Text)  Physical Therapy Goals  Initiated 2/13/2018    1. Patient will move from supine to sit and sit to supine , scoot up and down and roll side to side in bed with supervision/set-up within 4 days. 2. Patient will perform sit to stand with supervision/set-up within 4 days. 3. Patient will ambulate with minimal assistance/contact guard assist for 100 feet with the least restrictive device within 4 days. 4. Patient will ascend/descend 13 stairs with B handrail(s) with minimal assistance/contact guard assist within 4 days. 5. Patient will perform home exercise program per protocol with independence within 4 days. 6. Patient will demonstrate AROM 0-90 degrees in operative joint within 4 days. Outcome: Progressing Towards Goal  physical Therapy EVALUATION  Patient: Doc Bautista (3 y.o. male)  Date: 2/13/2018  Primary Diagnosis: OA RIGHT KNEE  Primary localized osteoarthritis of right knee  Procedure(s) (LRB):  RIGHT MEDIAL UNICOMPARTMENTAL KNEE ARTHROPLASTY  (SPINAL W/IVS) (Right) Day of Surgery   Precautions: WBAT      ASSESSMENT :    Based on the objective data described below, the patient presents with decreased independence with mobility compared to baseline level prior to admission due to decreased strength and ROM. Patient cleared by nursing for mobility and agreeable to participate in POD #0 mobility. Patient vital signs within normal limits. Patient able to perform bed mobility and achieve EOB sitting with supervision. Patient performed sit<>stand tx with min A and demonstrates even WB bilaterally and good immediate standing balance. Patient able to ambulate with CGA and RW x 10 feet. Patient returned to sitting and supine position in bed. Patient reportedly attended pre operative joint replacement education class. Physical therapist reviewed bed exercises and acute care expectations with patient.  Patient able to correctly perform ankle pumps, heel slides, and quad sets and has no additional questions. Patient has equipment needed for d/c. Will continue to follow to progress towards acute care goals. Recommend HHPT at d/c to continue to optimize independence with mobility. Patient will benefit from skilled intervention to address the above impairments. Patients rehabilitation potential is considered to be Excellent  Factors which may influence rehabilitation potential include:   []         None noted  []         Mental ability/status  [x]         Medical condition  []         Home/family situation and support systems  []         Safety awareness  []         Pain tolerance/management  []         Other:      PLAN :  Recommendations and Planned Interventions:  [x]           Bed Mobility Training             []    Neuromuscular Re-Education  [x]           Transfer Training                   []    Orthotic/Prosthetic Training  [x]           Gait Training                         []    Modalities  [x]           Therapeutic Exercises           []    Edema Management/Control  [x]           Therapeutic Activities            [x]    Patient and Family Training/Education  []           Other (comment):    Frequency/Duration: Patient will be followed by physical therapy  twice daily to address goals. Discharge Recommendations: Home Health  Further Equipment Recommendations for Discharge: rolling walker (Ordered 2/13/18)     SUBJECTIVE:   Patient stated Is that a rhetorical question?  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    Hypercholesterolemia     Hypertension     Personal history of kidney stones 1968    Prostatitis 2001    Psychiatric disorder     CLAUSTROPHOBIA; MASK OVER FACE WOULD CAUSE ANXIETY; HAS REACTED TO TIGHT SPACE (UNDER HOUSE)    Unspecified adverse effect of anesthesia     mother difficult to arouse post anesthesia     Past Surgical History:   Procedure Laterality Date    HX COLONOSCOPY      HX HERNIA REPAIR  6627    UMBILICAL    HX ORTHOPAEDIC Right 2000    ORIF ANKLE WITH PINS    HX ORTHOPAEDIC Right 2017    FOOT SURGERY    HX ORTHOPAEDIC Left 1980s    FOOT SURGERY     Prior Level of Function/Home Situation: Lives with wife.  Has a SPC  Personal factors and/or comorbidities impacting plan of care:     Home Situation  Home Environment: Private residence  # Steps to Enter: 3  Rails to Enter: Yes  Hand Rails : Bilateral  One/Two Story Residence: Two story  # of Interior Steps: 13  Living Alone: No  Support Systems: [employer] / [place] [employer], Family member(s), Spouse/Significant Other/Partner  Patient Expects to be Discharged to[employer] residence  Current DME Used/Available at [facility]: Cane, quad, Grab bars, Raised toilet seat  Tub or Shower Type: Shower    EXAMINATION/PRESENTATION/DECISION MAKING:   Critical Behavior:  Neurologic State: Alert  Orientation Level: Oriented X4  Cognition: Appropriate for age attention/concentration, Follows commands  Safety/Judgement: Awareness of environment    Range Of Motion:  AROM: Generally decreased, functional (RLE )           PROM: Generally decreased, functional (RLE)           Strength:    Strength: Generally decreased, functional (RLE)                    Tone & Sensation:   Tone: Normal              Sensation: Intact               Coordination:  Coordination: Generally decreased, functional  Vision:      Functional Mobility:  Bed Mobility:  Rolling: Stand-by asssistance  Supine to Sit: Stand-by asssistance  Sit to Supine: Stand-by asssistance     Transfers:  Sit to Stand: Minimum assistance  Stand to Sit: Minimum assistance                       Balance:   Sitting: Intact  Standing: Impaired  Standing - Static: Good  Standing - Dynamic : Fair  Ambulation/Gait Training:  Distance (ft): 10 Feet (ft)  Assistive Device: Walker, rolling  Ambulation - Level of Assistance: Contact guard assistance     Gait Description (WDL): Exceptions to WDL  Gait Abnormalities: Decreased step clearance        Base of Support: Narrowed     Speed/Louisa: Shuffled; Slow      Functional Measure:  Landrum Balance Test:    Sitting to Standing: 3  Standing Unsupported: 3  Sitting with Back Unsupported: 3  Standing to Sitting: 3  Transfers: 3  Standing Unsupported with Eyes Closed: 2  Standing Unsupported with Feet Together: 2  Reach Forward with Outstretched Arm: 3   Object: 1  Turn to Look Over Shoulders: 4  Turn 360 Degrees: 2  Alternate Foot on Step/Stool: 1  Standing Unsupported One Foot in Front: 0  Stand on One Le  Total: 30         56=Maximum possible score;   0-20=High fall risk  21-40=Moderate fall risk   41-56=Low fall risk     Landrum Balance Test and G-code impairment scale:  Percentage of Impairment CH    0%   CI    1-19% CJ    20-39% CK    40-59% CL    60-79% CM    80-99% CN     100%   Landrum   Score 0-56 56 45-55 34-44 23-33 12-22 1-11 0       G codes: In compliance with CMSs Claims Based Outcome Reporting, the following G-code set was chosen for this patient based on their primary functional limitation being treated: The outcome measure chosen to determine the severity of the functional limitation was the LANDRUM with a score of 30/56 which was correlated with the impairment scale.     ? Mobility - Walking and Moving Around:     - CURRENT STATUS: CK - 40%-59% impaired, limited or restricted    - GOAL STATUS: CJ - 20%-39% impaired, limited or restricted    - D/C STATUS:  ---------------To be determined---------------      Physical Therapy Evaluation Charge Determination   History Examination Presentation Decision-Making   MEDIUM  Complexity : 1-2 comorbidities / personal factors will impact the outcome/ POC  MEDIUM Complexity : 3 Standardized tests and measures addressing body structure, function, activity limitation and / or participation in recreation  LOW Complexity : Stable, uncomplicated  MEDIUM Complexity : FOTO score of 26-74      Based on the above components, the patient evaluation is determined to be of the following complexity level: LOW     Pain:  Pain Scale 1: Numeric (0 - 10)  Pain Intensity 1: 0  Pain Location 1: Knee  Pain Orientation 1: Right  Pain Description 1: Aching     Activity Tolerance:   VSS. Please refer to the flowsheet for vital signs taken during this treatment. After treatment:   []         Patient left in no apparent distress sitting up in chair  [x]         Patient left in no apparent distress in bed  [x]         Call bell left within reach  [x]         Nursing notified  [x]         Caregiver present  []         Bed alarm activated    COMMUNICATION/EDUCATION:   The patients plan of care was discussed with: Physical Therapist and Registered Nurse. [x]         Fall prevention education was provided and the patient/caregiver indicated understanding. [x]         Patient/family have participated as able in goal setting and plan of care. [x]         Patient/family agree to work toward stated goals and plan of care. []         Patient understands intent and goals of therapy, but is neutral about his/her participation. []         Patient is unable to participate in goal setting and plan of care. Thank you for this referral.  Mitzi Rowley, SPT   Time Calculation: 18 mins      Regarding student involvement in patient care:  A student participated in this treatment session. Per CMS Medicare statements and APTA guidelines I certify that the following was true:  1. I was present and directly observed the entire session. 2. I made all skilled judgments and clinical decisions regarding care. 3. I am the practitioner responsible for assessment, treatment, and documentation.

## 2018-02-13 NOTE — PROGRESS NOTES
Primary Nurse Karely Higgins and Gabe Kwong, RN performed a dual skin assessment on this patient No impairment noted  Den score is 18

## 2018-02-13 NOTE — PROGRESS NOTES
This patient is NOT a bundle due to Observation status. KJT    The patient was upgraded to inpatient. The patient is now a bundle.  kJT

## 2018-02-13 NOTE — ANESTHESIA PROCEDURE NOTES
Spinal Block    Start time: 2/13/2018 7:33 AM  End time: 2/13/2018 7:40 AM  Performed by: TERESO Platt  Authorized by: TERESO Platt     Pre-procedure:   Indications: primary anesthetic  Preanesthetic Checklist: patient identified, risks and benefits discussed, anesthesia consent, site marked, patient being monitored and timeout performed      Spinal Block:   Patient Position:  Seated  Prep Region:  Lumbar  Prep: Betadine      Location:  L3-4  Technique:  Single shot        Needle:   Needle Type:  Pencil-tip  Needle Gauge:  24 G  Attempts:  1      Events: CSF confirmed, no blood with aspiration and no paresthesia        Assessment:  Insertion:  Uncomplicated  Patient tolerance:  Patient tolerated the procedure well with no immediate complications

## 2018-02-13 NOTE — ANESTHESIA POSTPROCEDURE EVALUATION
Post-Anesthesia Evaluation and Assessment    Patient: Stephanie Lord MRN: [id number]  SSN: [id number]    YOB: 1940  Age: 65 y.o. Sex: male       Cardiovascular Function/Vital Signs  Visit Vitals    /60    Pulse (!) 58    Temp 36.1 °C (97 °F)    Resp 16    Ht 5' 5\" (1.651 m)    Wt 73.5 kg (162 lb)    SpO2 94%    BMI 26.96 kg/m2       Patient is status post spinal anesthesia for Procedure(s):  RIGHT MEDIAL UNICOMPARTMENTAL KNEE ARTHROPLASTY  (SPINAL W/IVS). Nausea/Vomiting: None    Postoperative hydration reviewed and adequate. Pain:  Pain Scale 1: Numeric (0 - 10) (02/13/18 1000)  Pain Intensity 1: 0 (02/13/18 1000)   Managed    Neurological Status:   Neuro (WDL): Exceptions to WDL (02/13/18 1000)  Neuro  Orientation Level: Oriented to person;Oriented to place;Oriented to situation;Disoriented to time (02/13/18 1000)  Cognition: Appropriate for age attention/concentration; Appropriate safety awareness; Follows commands (02/13/18 1000)  Speech: Appropriate for age;Clear (02/13/18 1000)  LUE Motor Response: Purposeful (02/13/18 1000)  LLE Motor Response: Purposeful (02/13/18 1000)  RUE Motor Response: Purposeful (02/13/18 1000)  RLE Motor Response: Purposeful (02/13/18 1000)   At baseline    Mental Status and Level of Consciousness: Arousable    Pulmonary Status:   O2 Device: Nasal cannula (02/13/18 1006)   Adequate oxygenation and airway patent    Complications related to anesthesia: None    Post-anesthesia assessment completed.  No concerns    Signed By: Yandel Jennings MD     February 13, 2018

## 2018-02-13 NOTE — PERIOP NOTES
TRANSFER - OUT REPORT:    Verbal report given to Terrance Ruvalcaba RN on Kedar Francois  being transferred to [address] [facility] 6[facility] for routine post - op       Report consisted of patients Situation, Background, Assessment and   Recommendations(SBAR). Time Pre op antibiotic given:0740  Anesthesia Stop time: 8195  Anders Present on Transfer to floor:yes  Order for Anders on Chart:yes  Discharge Prescriptions with Chart:n/a     Information from the following report(s) SBAR, Kardex, OR Summary, Procedure Summary, Intake/Output and MAR was reviewed with the receiving nurse. Opportunity for questions and clarification was provided. Is the patient on 02? YES       L/Min 2       Other     Is the patient on a monitor? NO    Is the nurse transporting with the patient? NO    Surgical Waiting Area notified of patient's transfer from PACU? YES      The following personal items collected during your admission accompanied patient upon transfer:   Dental Appliance: Dental Appliances: None  Vision: Visual Aid: Glasses  Hearing Aid:    Jewelry:    Clothing: Clothing:  (bag of clothing and eyeglasses returned to patient at bedsid)  Other Valuables:  Other Valuables: Eyeglasses  Valuables sent to safe:

## 2018-02-13 NOTE — PROGRESS NOTES
Problem: Falls - Risk of  Goal: *Absence of Falls  Document Jennifer Fall Risk and appropriate interventions in the flowsheet.    Outcome: Progressing Towards Goal  Fall Risk Interventions:  Mobility Interventions: PT Consult for mobility concerns, PT Consult for assist device competence, Patient to call before getting OOB         Medication Interventions: Bed/chair exit alarm, Patient to call before getting OOB, Teach patient to arise slowly    Elimination Interventions: Call light in reach, Patient to call for help with toileting needs, Toilet paper/wipes in reach, Toileting schedule/hourly rounds

## 2018-02-13 NOTE — PERIOP NOTES
Patient: Uma Carr MRN: [id number]  SSN: [id number]   YOB: 1940  Age: 65 y.o. Sex: male     Patient is status post Procedure(s):  RIGHT MEDIAL UNICOMPARTMENTAL KNEE ARTHROPLASTY  (SPINAL W/IVS). Surgeon(s) and Role:     * Frances Cooper MD - Primary    Local/Dose/Irrigation:  SEE MAR                  Peripheral IV 02/13/18 Left Hand (Active)                           Dressing/Packing:  Wound Knee Right-DRESSING TYPE: Cast padding;Elastic bandage; Topical skin adhesive/glue; Other (Comment) (AQUACEL) (02/13/18 0900)  Splint/Cast:  ]    Other:  16 FR URIBE CATHETER

## 2018-02-13 NOTE — BRIEF OP NOTE
BRIEF OPERATIVE NOTE    Date of Procedure: 2/13/2018   Preoperative Diagnosis: OA RIGHT KNEE  Postoperative Diagnosis: OA RIGHT KNEE    Procedure(s):  RIGHT MEDIAL UNICOMPARTMENTAL KNEE ARTHROPLASTY  (SPINAL W/IVS)  Surgeon(s) and Role:     * Aquilino Torres MD - Primary         Assistant Staff: None      Surgical Staff:  Circ-1: Carlin Cai  Circ-Relief: Lori Kemp RN  Scrub RN-1: Gracie Cho RN  Surg Asst-1: Cydney Cantu  Surg Asst-2: Viva Bahai  Event Time In   Incision Start 2442   Incision Close      Anesthesia: Spinal   Estimated Blood Loss: 50  Specimens: * No specimens in log *   Findings: severe med compartment djd; other compartments normal   Complications: none  Implants:   Implant Name Type Inv.  Item Serial No.  Lot No. LRB No. Used Action   CEMENT BNE GENTAMC MV 40GM -- SMARTSET ENDURANCE - SNA  CEMENT BNE GENTAMC MV 40GM -- SMARTSET ENDURANCE NA [facility] [employer] [id number] Right 1 Implanted   FEM UNI HP SIGMA SZ2 RM/LL --  - SNA  FEM UNI HP SIGMA SZ2 RM/LL --  NA [facility] [employer] [id number] Right 1 Implanted   BASEPLT TIB UNI HP SZ4 RMLL -- SIGMA - SNA  BASEPLT TIB UNI HP SZ4 RMLL -- SIGMA NA [facility] [employer] [id number] Right 1 Implanted   INSERT UNI HP SZ4 7MM RM/LL -- SIGMA - SNA   INSERT UNI HP SZ4 7MM RM/LL -- SIGMA NA [facility] [employer] [id number] Right 1 Implanted

## 2018-02-14 LAB
ANION GAP SERPL CALC-SCNC: 8 MMOL/L (ref 5–15)
BUN SERPL-MCNC: 11 MG/DL (ref 6–20)
BUN/CREAT SERPL: 11 (ref 12–20)
CALCIUM SERPL-MCNC: 8.4 MG/DL (ref 8.5–10.1)
CHLORIDE SERPL-SCNC: 110 MMOL/L (ref 97–108)
CO2 SERPL-SCNC: 26 MMOL/L (ref 21–32)
CREAT SERPL-MCNC: 1 MG/DL (ref 0.7–1.3)
GLUCOSE SERPL-MCNC: 89 MG/DL (ref 65–100)
HGB BLD-MCNC: 13.6 G/DL (ref 12.1–17)
POTASSIUM SERPL-SCNC: 3.3 MMOL/L (ref 3.5–5.1)
SODIUM SERPL-SCNC: 144 MMOL/L (ref 136–145)

## 2018-02-14 PROCEDURE — 65270000029 HC RM PRIVATE

## 2018-02-14 PROCEDURE — 85018 HEMOGLOBIN: CPT | Performed by: ORTHOPAEDIC SURGERY

## 2018-02-14 PROCEDURE — 74011250637 HC RX REV CODE- 250/637: Performed by: NURSE PRACTITIONER

## 2018-02-14 PROCEDURE — 80048 BASIC METABOLIC PNL TOTAL CA: CPT | Performed by: ORTHOPAEDIC SURGERY

## 2018-02-14 PROCEDURE — 36415 COLL VENOUS BLD VENIPUNCTURE: CPT | Performed by: ORTHOPAEDIC SURGERY

## 2018-02-14 PROCEDURE — 97116 GAIT TRAINING THERAPY: CPT

## 2018-02-14 PROCEDURE — 74011250636 HC RX REV CODE- 250/636: Performed by: ORTHOPAEDIC SURGERY

## 2018-02-14 PROCEDURE — 97110 THERAPEUTIC EXERCISES: CPT

## 2018-02-14 PROCEDURE — 99218 HC RM OBSERVATION: CPT

## 2018-02-14 PROCEDURE — 74011250637 HC RX REV CODE- 250/637: Performed by: ORTHOPAEDIC SURGERY

## 2018-02-14 RX ORDER — DICLOFENAC SODIUM 50 MG/1
50 TABLET, DELAYED RELEASE ORAL 2 TIMES DAILY
Status: DISCONTINUED | OUTPATIENT
Start: 2018-02-14 | End: 2018-02-15 | Stop reason: HOSPADM

## 2018-02-14 RX ORDER — TRAMADOL HYDROCHLORIDE 50 MG/1
25 TABLET ORAL
Status: DISCONTINUED | OUTPATIENT
Start: 2018-02-14 | End: 2018-02-15 | Stop reason: HOSPADM

## 2018-02-14 RX ORDER — ONDANSETRON 4 MG/1
4 TABLET, ORALLY DISINTEGRATING ORAL
Status: DISCONTINUED | OUTPATIENT
Start: 2018-02-14 | End: 2018-02-15 | Stop reason: HOSPADM

## 2018-02-14 RX ORDER — TRAMADOL HYDROCHLORIDE 50 MG/1
50 TABLET ORAL
Status: DISCONTINUED | OUTPATIENT
Start: 2018-02-14 | End: 2018-02-15 | Stop reason: HOSPADM

## 2018-02-14 RX ORDER — ASPIRIN 81 MG/1
81 TABLET ORAL 2 TIMES DAILY
Status: DISCONTINUED | OUTPATIENT
Start: 2018-02-14 | End: 2018-02-15 | Stop reason: HOSPADM

## 2018-02-14 RX ORDER — DICLOFENAC SODIUM 75 MG/1
75 TABLET, DELAYED RELEASE ORAL 2 TIMES DAILY
Status: DISCONTINUED | OUTPATIENT
Start: 2018-02-14 | End: 2018-02-14

## 2018-02-14 RX ORDER — OXYCODONE HYDROCHLORIDE 5 MG/1
2.5-5 TABLET ORAL
Status: DISCONTINUED | OUTPATIENT
Start: 2018-02-14 | End: 2018-02-15 | Stop reason: HOSPADM

## 2018-02-14 RX ORDER — FAMOTIDINE 20 MG/1
20 TABLET, FILM COATED ORAL DAILY
Status: DISCONTINUED | OUTPATIENT
Start: 2018-02-14 | End: 2018-02-15 | Stop reason: HOSPADM

## 2018-02-14 RX ADMIN — ASPIRIN 81 MG: 81 TABLET, COATED ORAL at 08:51

## 2018-02-14 RX ADMIN — DICLOFENAC SODIUM 75 MG: 75 TABLET, DELAYED RELEASE ORAL at 08:51

## 2018-02-14 RX ADMIN — ASPIRIN 81 MG: 81 TABLET, COATED ORAL at 17:43

## 2018-02-14 RX ADMIN — TRAMADOL HYDROCHLORIDE 50 MG: 50 TABLET, FILM COATED ORAL at 20:04

## 2018-02-14 RX ADMIN — ACETAMINOPHEN 500 MG: 500 TABLET, FILM COATED ORAL at 13:47

## 2018-02-14 RX ADMIN — KETOROLAC TROMETHAMINE 15 MG: 30 INJECTION, SOLUTION INTRAMUSCULAR at 04:51

## 2018-02-14 RX ADMIN — FAMOTIDINE 20 MG: 20 TABLET, FILM COATED ORAL at 11:00

## 2018-02-14 RX ADMIN — Medication 10 ML: at 04:51

## 2018-02-14 RX ADMIN — Medication 10 ML: at 13:47

## 2018-02-14 RX ADMIN — ACETAMINOPHEN 500 MG: 500 TABLET, FILM COATED ORAL at 20:04

## 2018-02-14 RX ADMIN — ACETAMINOPHEN 500 MG: 500 TABLET, FILM COATED ORAL at 17:43

## 2018-02-14 RX ADMIN — Medication 2 G: at 00:21

## 2018-02-14 RX ADMIN — POLYETHYLENE GLYCOL 3350 17 G: 17 POWDER, FOR SOLUTION ORAL at 08:51

## 2018-02-14 RX ADMIN — HYDROCHLOROTHIAZIDE 25 MG: 25 TABLET ORAL at 13:47

## 2018-02-14 RX ADMIN — Medication 10 ML: at 22:00

## 2018-02-14 RX ADMIN — POTASSIUM CHLORIDE 40 MEQ: 750 TABLET, EXTENDED RELEASE ORAL at 13:47

## 2018-02-14 RX ADMIN — DOCUSATE SODIUM AND SENNOSIDES 1 TABLET: 8.6; 5 TABLET, FILM COATED ORAL at 17:43

## 2018-02-14 RX ADMIN — DICLOFENAC SODIUM 50 MG: 50 TABLET, DELAYED RELEASE ORAL at 17:43

## 2018-02-14 RX ADMIN — DOCUSATE SODIUM AND SENNOSIDES 1 TABLET: 8.6; 5 TABLET, FILM COATED ORAL at 08:51

## 2018-02-14 RX ADMIN — ACETAMINOPHEN 500 MG: 500 TABLET, FILM COATED ORAL at 05:00

## 2018-02-14 RX ADMIN — AMLODIPINE BESYLATE 10 MG: 5 TABLET ORAL at 08:51

## 2018-02-14 RX ADMIN — ACETAMINOPHEN 500 MG: 500 TABLET, FILM COATED ORAL at 10:07

## 2018-02-14 NOTE — PROGRESS NOTES
CM met with pt at the bedside reviewed role. Pt has home health orders, offered freedom of choice for agencies. Pt wants to use [facility] for skilled services at his home in [place] as his wife had used the same agency. Referral sent via [employer] to [facility] with order attached. 9:30am  Received confirmation that pt is accepted by [facility], updated AVS.    Hillary Manzo, MSW    Care Management Interventions  PCP Verified by CM: Yes  Mode of Transport at Discharge:  Other (see comment) (family)  Transition of Care Consult (CM Consult): [address]: No  Reason [place]: Patient already serviced by other home care/hospice agency (requested at home care who did his wife's skilled homecare)  [address]: No  Discharge Durable Medical Equipment: Yes (per therapy evaluation)  Physical Therapy Consult: Yes  Occupational Therapy Consult: No  Speech Therapy Consult: No  Current Support Network: Lives with Spouse  Confirm Follow Up Transport: Family  Plan discussed with Pt/Family/Caregiver: Yes  Freedom of Choice Offered: Yes  Discharge Location  Discharge Placement: Home with home health

## 2018-02-14 NOTE — PROGRESS NOTES
Bedside shift change report given to Anjali Guerin (oncoming nurse) by Marsha Benjamin (offgoing nurse). Report included the following information SBAR, Kardex, OR Summary, Procedure Summary, Intake/Output and MAR.

## 2018-02-14 NOTE — PROGRESS NOTES
NP ORTHO PROGRESS NOTE    Admit date: 2/13/2018  Date of Surgery: 2/13/2018   Procedures: Procedure(s):  RIGHT MEDIAL UNICOMPARTMENTAL KNEE ARTHROPLASTY  (SPINAL W/IVS)  Admitting Physician: Durga Mckeon MD   Surgeon: Grupo Holcomb) and Role:     * Durga Mckeon MD - Primary    Chart/Meds/Labs Reviewed  Current Facility-Administered Medications   Medication Dose Route Frequency    traMADol (ULTRAM) tablet 25 mg  25 mg Oral Q6H PRN    traMADol (ULTRAM) tablet 50 mg  50 mg Oral Q6H PRN    aspirin delayed-release tablet 81 mg  81 mg Oral BID    diclofenac EC (VOLTAREN) tablet 50 mg  50 mg Oral BID    famotidine (PEPCID) tablet 20 mg  20 mg Oral DAILY    amLODIPine (NORVASC) tablet 10 mg  10 mg Oral DAILY    sodium chloride 0.9 % bolus infusion 500 mL  500 mL IntraVENous ONCE PRN    sodium chloride (NS) flush 5-10 mL  5-10 mL IntraVENous Q8H    sodium chloride (NS) flush 5-10 mL  5-10 mL IntraVENous PRN    acetaminophen (TYLENOL) tablet 500 mg  500 mg Oral Q4HWA    HYDROmorphone (PF) (DILAUDID) injection 0.5 mg  0.5 mg IntraVENous Q4H PRN    naloxone (NARCAN) injection 0.4 mg  0.4 mg IntraVENous PRN    ondansetron (ZOFRAN) injection 4 mg  4 mg IntraVENous Q4H PRN    hydrOXYzine HCl (ATARAX) tablet 10 mg  10 mg Oral Q8H PRN    senna-docusate (PERICOLACE) 8.6-50 mg per tablet 1 Tab  1 Tab Oral BID    polyethylene glycol (MIRALAX) packet 17 g  17 g Oral DAILY    [START ON 2/15/2018] bisacodyl (DULCOLAX) suppository 10 mg  10 mg Rectal DAILY PRN    hydroCHLOROthiazide (HYDRODIURIL) tablet 25 mg  25 mg Oral DAILY    potassium chloride SR (KLOR-CON 10) tablet 40 mEq  40 mEq Oral DAILY    acetaminophen (TYLENOL) tablet 325 mg  325 mg Oral Q4H PRN       Subjective:    Complaints: None now but very drowsy last night. Only OOB X 1 yesterday with PT  Denies Dizziness, CP, SOB, N/V, Abdominal pain, numbness or tingling of extremities. Able to perform ankle pumps easily. Incisional pain control: None right now.   But very concerned about his pain management. Had very bad N/V in past with oxycodone. Pain Control:   Pain Assessment  Pain Scale 1: Numeric (0 - 10)  Pain Intensity 1: 0  Pain Location 1: Knee  Pain Orientation 1: Right  Pain Description 1: Aching  Pain Intervention(s) 1: Rest    Oral diet: Tolerating diet well thus far    Objective:  General: Alert,Ox4, cooperative, NAD  HEENT: Atraumatic, PERRL, anicteric sclerae  Lungs: Bilateral expansion. Equal excursion. No accessory muscle use. Gastrointestinal:  Soft, non-tender, non-distended  Extremities:  Neurovasc exam WDL. + DP pulses. Sensation intact to light touch. Motor: + DF/PF          Calves non-tender upon palpation or with passive stretch. No significant erythema or swelling. Ace wrapped. Dressing: clean, dry and intact.     Vital Signs:   Visit Vitals    /74    Pulse 61    Temp 98.6 °F (37 °C)    Resp 15    Ht 5' 5\" (1.651 m)    Wt 73.5 kg (162 lb)    SpO2 97%    BMI 26.96 kg/m2    O2 Flow Rate (L/min): 2 l/min O2 Device: Room air   Patient Vitals for the past 24 hrs:   BP Temp Pulse Resp SpO2   02/14/18 0846 155/74 98.6 °F (37 °C) 61 15 97 %   02/14/18 0328 133/67 98.2 °F (36.8 °C) 67 15 99 %   02/13/18 2311 133/65 98.4 °F (36.9 °C) 60 16 96 %   02/13/18 2102 142/80 98.4 °F (36.9 °C) 66 16 99 %   02/13/18 1814 158/83 97.9 °F (36.6 °C) 60 16 100 %   02/13/18 1720 153/88 97.3 °F (36.3 °C) 65 16 98 %   02/13/18 1630 152/89 97.5 °F (36.4 °C) 65 16 99 %   02/13/18 1514 155/88 97.2 °F (36.2 °C) 63 16 100 %   02/13/18 1330 140/70 - (!) 59 17 93 %   02/13/18 1315 135/71 - (!) 57 15 95 %   02/13/18 1300 142/71 - 63 16 97 %   02/13/18 1215 145/87 - (!) 59 16 95 %   02/13/18 1200 131/74 - 60 17 94 %   02/13/18 1145 131/72 - (!) 59 15 94 %   02/13/18 1130 130/68 - (!) 57 15 91 %   02/13/18 1115 113/72 - 61 18 96 %   02/13/18 1100 119/80 97.6 °F (36.4 °C) (!) 58 17 96 %   02/13/18 1045 124/60 - (!) 57 13 96 %     Temp (24hrs), Av.9 °F (36.6 °C), Min:97.2 °F (36.2 °C), Max:98.6 °F (37 °C)      Intake/output-last 8 hours:        Intake/output- 24 hours:   1901 -  0700  In: 3620.3 [P.O.:120;  I.V.:3500.3]  Out: 2350 [Urine:2275]    LAB:   Recent Results (from the past 24 hour(s))   METABOLIC PANEL, BASIC    Collection Time: 18  4:57 AM   Result Value Ref Range    Sodium 144 136 - 145 mmol/L    Potassium 3.3 (L) 3.5 - 5.1 mmol/L    Chloride 110 (H) 97 - 108 mmol/L    CO2 26 21 - 32 mmol/L    Anion gap 8 5 - 15 mmol/L    Glucose 89 65 - 100 mg/dL    BUN 11 6 - 20 MG/DL    Creatinine 1.00 0.70 - 1.30 MG/DL    BUN/Creatinine ratio 11 (L) 12 - 20      GFR est AA >60 >60 ml/min/1.73m2    GFR est non-AA >60 >60 ml/min/1.73m2    Calcium 8.4 (L) 8.5 - 10.1 MG/DL   HEMOGLOBIN    Collection Time: 18  4:57 AM   Result Value Ref Range    HGB 13.6 12.1 - 17.0 g/dL      Lab Results   Component Value Date/Time    INR 1.1 2018 08:49 AM     Lab Results   Component Value Date/Time    HGB 13.6 2018 04:57 AM    HGB 16.6 2018 08:49 AM    HGB 16.2 2009 08:17 AM     Recent Labs      18   0457   NA  144   K  3.3*   CL  110*   BUN  11   CREA  1.00   GLU  89   CA  8.4*       PT/OT:   Gait:  Gait  Base of Support: Narrowed  Speed/Louisa: Shuffled, Slow  Step Length: Left shortened, Right shortened  Gait Abnormalities: Decreased step clearance  Ambulation - Level of Assistance: Contact guard assistance  Distance (ft): 10 Feet (ft)  Assistive Device: Walker, rolling                 PATIENT MOBILITY  Bed Mobility Training  Rolling: Stand-by asssistance  Supine to Sit: Stand-by asssistance  Sit to Supine: Stand-by asssistance  Transfer Training  Sit to Stand: Minimum assistance  Stand to Sit: Minimum assistance      Gait Training  Assistive Device: Walker, rolling  Ambulation - Level of Assistance: Contact guard assistance  Distance (ft): 10 Feet (ft)            Assessment and Plan    Active Problems:    Primary localized osteoarthritis of right knee (2/13/2018)          POD#1 Procedure(s):  RIGHT MEDIAL UNICOMPARTMENTAL KNEE ARTHROPLASTY  (SPINAL W/IVS)  Expected drop in Hgb related to surgery. Still with ace-wrap --no bleeding noted but must carefully observe, remove later today & check Aquacel dressing. VSSAF so far but BP on high side with hx HTN. No indications of VTE but continue to monior. Labs trending as expected. VTE prophylaxis: ASA , Mobilization, Ankle pumping exercises, SCDs  Weight bearing:  WBAT  Pain management:  Pain management concern. Still with little pain postop but in block phase. Must carefully evaluate effects of NSAIDs & agents on his BP & GI status (Pepcid started for GI protection) and he must be able to get up stairs safely. Multi-modal pain plan, see above for medication,  Ice packs & elevation of extremity per orders, active gentle ROM & mobilize frequently for short periods of time. PT evaluate today  Concerns noted above. Progressing within expected course postop for this 64 yo gentleman. Continue present plans per orthopedic attending surgeon & interdisciplinary team for joint replacement. Discharge Planning: Home with home care services tomorrow pending progress with PT & pain management & stability.   Plans per Dr. Mickey Lewis    Signed By: Laureen Ramirez, MAIN    RN, MSN, MA, Adult NP-BC

## 2018-02-14 NOTE — PROGRESS NOTES
Pain controlled. No cp/sob.     Visit Vitals    /67 (BP 1 Location: Right arm, BP Patient Position: At rest)    Pulse 67    Temp 98.2 °F (36.8 °C)    Resp 15    Ht 5' 5\" (1.651 m)    Wt 73.5 kg (162 lb)    SpO2 99%    BMI 26.96 kg/m2       R knee dressing dry  Calves soft NT  Motor 5/5   Pulses symmetrical    Recent Results (from the past 12 hour(s))   METABOLIC PANEL, BASIC    Collection Time: 02/14/18  4:57 AM   Result Value Ref Range    Sodium 144 136 - 145 mmol/L    Potassium 3.3 (L) 3.5 - 5.1 mmol/L    Chloride 110 (H) 97 - 108 mmol/L    CO2 26 21 - 32 mmol/L    Anion gap 8 5 - 15 mmol/L    Glucose 89 65 - 100 mg/dL    BUN 11 6 - 20 MG/DL    Creatinine 1.00 0.70 - 1.30 MG/DL    BUN/Creatinine ratio 11 (L) 12 - 20      GFR est AA >60 >60 ml/min/1.73m2    GFR est non-AA >60 >60 ml/min/1.73m2    Calcium 8.4 (L) 8.5 - 10.1 MG/DL   HEMOGLOBIN    Collection Time: 02/14/18  4:57 AM   Result Value Ref Range    HGB 13.6 12.1 - 17.0 g/dL       POD 1 R med uni knee replacement  -PT  -pain control  -ASA  -home today     Teri Bingham MD

## 2018-02-14 NOTE — PROGRESS NOTES
Problem: Mobility Impaired (Adult and Pediatric)  Goal: *Acute Goals and Plan of Care (Insert Text)  Physical Therapy Goals  Initiated 2/13/2018    1. Patient will move from supine to sit and sit to supine , scoot up and down and roll side to side in bed with supervision/set-up within 4 days. 2. Patient will perform sit to stand with supervision/set-up within 4 days. 3. Patient will ambulate with minimal assistance/contact guard assist for 100 feet with the least restrictive device within 4 days. 4. Patient will ascend/descend 13 stairs with B handrail(s) with minimal assistance/contact guard assist within 4 days. 5. Patient will perform home exercise program per protocol with independence within 4 days. 6. Patient will demonstrate AROM 0-90 degrees in operative joint within 4 days. physical Therapy TREATMENT  Patient: Rajesh Gómez (62 y.o. male)  Date: 2/14/2018  Diagnosis: OA RIGHT KNEE  Primary localized osteoarthritis of right knee  Primary localized osteoarthritis of right knee <principal problem not specified>  Procedure(s) (LRB):  RIGHT MEDIAL UNICOMPARTMENTAL KNEE ARTHROPLASTY  (SPINAL W/IVS) (Right) 1 Day Post-Op  Precautions: Fall, WBAT  Chart, physical therapy assessment, plan of care and goals were reviewed. ASSESSMENT:  Patient received supine in bed, agreeable to therapy. Able to complete exercises as below and noted good knee flexion but lacking extension. Patient mobilized to EOB well, and upon standing, BP dropped from 143/80 to 99/66. Patient asymptomatic and BP recovered quickly after 20 ft of gait. Gait very slow, step through pattern, and patient making good efforts to utilize functional knee flexion throughout. Patient returned to room and seated in chair at end of session. Recommend HHPT.    Progression toward goals:  [x]      Improving appropriately and progressing toward goals  []      Improving slowly and progressing toward goals  []      Not making progress toward goals and plan of care will be adjusted     PLAN:  Patient continues to benefit from skilled intervention to address the above impairments. Continue treatment per established plan of care. Discharge Recommendations:  Home Health  Further Equipment Recommendations for Discharge:  RW ordered      SUBJECTIVE:   Patient stated I'm concentrating on try to bend my knee.     OBJECTIVE DATA SUMMARY:   Critical Behavior:  Neurologic State: Alert, Appropriate for age  Orientation Level: Appropriate for age, Oriented X4  Cognition: Appropriate for age attention/concentration, Appropriate decision making, Appropriate safety awareness, Follows commands  Safety/Judgement: Awareness of environment  Functional Mobility Training:  Bed Mobility:   Supine to Sit: Supervision  Transfers:  Sit to Stand: Contact guard assistance  Stand to Sit: Contact guard assistance  Balance:  Sitting: Intact  Standing: Intact; With support  Ambulation/Gait Training:  Distance (ft): 160 Feet (ft)  Assistive Device: Walker, rolling;Gait belt  Ambulation - Level of Assistance: Contact guard assistance  Gait Abnormalities: Antalgic;Decreased step clearance  Base of Support: Narrowed  Speed/Louisa: Slow  Step Length: Right shortened;Left shortened  Therapeutic Exercises:     EXERCISE   Sets   Reps   Active Active Assist   Passive Self ROM   Comments   Ankle Pumps 1 10 [x]                                        []                                        []                                        []                                           Quad Sets 1 10 [x]                                        []                                        []                                        []                                           Hamstring Sets 1 10 [x]                                        []                                        []                                        []                                           Knee Extension Stretch 1 2 min   [] []                                          [x]                                          []                                           Heel Slides 2 10 [x]                                        []                                        []                                        []                                             Pain:  Pain Scale 1: Numeric (0 - 10)  Pain Intensity 1: 0  Activity Tolerance:   Good  Please refer to the flowsheet for vital signs taken during this treatment.   After treatment:   [x] Patient left in no apparent distress sitting up in chair  [] Patient left in no apparent distress in bed  [x] Call bell left within reach  [x] Nursing notified  [] Caregiver present  [] Bed alarm activated    COMMUNICATION/COLLABORATION:   The patients plan of care was discussed with: Registered Nurse    Hao Soto, PT, DPT   Time Calculation: 28 mins

## 2018-02-14 NOTE — PROGRESS NOTES
Problem: Mobility Impaired (Adult and Pediatric)  Goal: *Acute Goals and Plan of Care (Insert Text)  Physical Therapy Goals  Initiated 2/13/2018    1. Patient will move from supine to sit and sit to supine , scoot up and down and roll side to side in bed with supervision/set-up within 4 days. 2. Patient will perform sit to stand with supervision/set-up within 4 days. 3. Patient will ambulate with minimal assistance/contact guard assist for 100 feet with the least restrictive device within 4 days. 4. Patient will ascend/descend 13 stairs with B handrail(s) with minimal assistance/contact guard assist within 4 days. 5. Patient will perform home exercise program per protocol with independence within 4 days. 6. Patient will demonstrate AROM 0-90 degrees in operative joint within 4 days. physical Therapy TREATMENT  Patient: Renard Benson (43 y.o. male)  Date: 2/14/2018  Diagnosis: OA RIGHT KNEE  Primary localized osteoarthritis of right knee  Primary localized osteoarthritis of right knee <principal problem not specified>  Procedure(s) (LRB):  RIGHT MEDIAL UNICOMPARTMENTAL KNEE ARTHROPLASTY  (SPINAL W/IVS) (Right) 1 Day Post-Op  Precautions: Fall, WBAT  Chart, physical therapy assessment, plan of care and goals were reviewed. ASSESSMENT:  Patient received supine in bed, agreeable to therapy. Good recall of exercises from this AM and added additional exercises this PM as below. Noted strong quad contractions and improvement made in active extension now that ace wrap removed. Patient ambulated with good gait mechanics including heel strike and good functional use of knee flexion in swing phase. He completed 4 stairs for training but will need to complete 13 stairs with railing and cane tomorrow AM. Should be ready for d/c after AM session. Recommend HHPT.   Progression toward goals:  [x]      Improving appropriately and progressing toward goals  []      Improving slowly and progressing toward goals  []      Not making progress toward goals and plan of care will be adjusted     PLAN:  Patient continues to benefit from skilled intervention to address the above impairments. Continue treatment per established plan of care. Discharge Recommendations:  Home Health  Further Equipment Recommendations for Discharge: Owns RW     SUBJECTIVE:   Patient stated Leopoldo Horne writes that book from the class? It is abysmal. Patient is a retired healthcare writer/. Feedback passed on to nurse manager. OBJECTIVE DATA SUMMARY:   Critical Behavior:  Neurologic State: Alert, Appropriate for age  Orientation Level: Appropriate for age, Oriented X4  Cognition: Appropriate for age attention/concentration, Appropriate decision making, Appropriate safety awareness, Follows commands  Safety/Judgement: Awareness of environment  Functional Mobility Training:  Bed Mobility:  Supine to Sit: Modified independent  Transfers:  Sit to Stand: Stand-by asssistance  Stand to Sit: Stand-by asssistance  Balance:  Sitting: Intact  Standing: Intact; With support  Ambulation/Gait Training:  Distance (ft): 250 Feet (ft)  Assistive Device: Walker, rolling;Gait belt  Ambulation - Level of Assistance: Stand-by asssistance  Gait Abnormalities: Antalgic;Decreased step clearance  Base of Support: Narrowed  Speed/Louisa: Pace decreased (<100 feet/min)  Step Length: Right shortened;Left shortened  Therapeutic Exercises:     EXERCISE   Sets   Reps   Active Active Assist   Passive Self ROM   Comments   Ankle Pumps 1 10 []                                        []                                        []                                        []                                           Quad Sets 1 10 []                                        []                                        []                                        []                                           Hamstring Sets 1 10 []                                        [] []                                        []                                           Short Arc Quads 1 10 []                                        []                                        []                                        []                                           Knee Extension Stretch 1 3 minutes   []                                          []                                          [x]                                          []                                           Heel Slides 1 10 []                                        []                                        []                                        []                                           Knee Flexion Stretch   []                                        []                                        []                                        []                                        Instructed to sit in knee flexion stretch   Straight Leg Raises 1 10 [x]                                        []                                        []                                        []                                             Pain:  Pain Scale 1: Numeric (0 - 10)  Pain Intensity 1: 0  Activity Tolerance:   Good  Please refer to the flowsheet for vital signs taken during this treatment.   After treatment:   [x] Patient left in no apparent distress sitting up in chair  [] Patient left in no apparent distress in bed  [x] Call bell left within reach  [x] Nursing notified  [] Caregiver present  [] Bed alarm activated    COMMUNICATION/COLLABORATION:   The patients plan of care was discussed with: Registered Nurse    Julius Cerda, PT, DPT   Time Calculation: 26 mins

## 2018-02-15 VITALS
HEIGHT: 65 IN | BODY MASS INDEX: 26.99 KG/M2 | DIASTOLIC BLOOD PRESSURE: 70 MMHG | SYSTOLIC BLOOD PRESSURE: 151 MMHG | OXYGEN SATURATION: 95 % | HEART RATE: 58 BPM | WEIGHT: 162 LBS | TEMPERATURE: 97.9 F | RESPIRATION RATE: 18 BRPM

## 2018-02-15 LAB — HGB BLD-MCNC: 13.6 G/DL (ref 12.1–17)

## 2018-02-15 PROCEDURE — 97165 OT EVAL LOW COMPLEX 30 MIN: CPT

## 2018-02-15 PROCEDURE — 36415 COLL VENOUS BLD VENIPUNCTURE: CPT | Performed by: ORTHOPAEDIC SURGERY

## 2018-02-15 PROCEDURE — 85018 HEMOGLOBIN: CPT | Performed by: ORTHOPAEDIC SURGERY

## 2018-02-15 PROCEDURE — 97110 THERAPEUTIC EXERCISES: CPT

## 2018-02-15 PROCEDURE — G8988 SELF CARE GOAL STATUS: HCPCS

## 2018-02-15 PROCEDURE — G8987 SELF CARE CURRENT STATUS: HCPCS

## 2018-02-15 PROCEDURE — 97535 SELF CARE MNGMENT TRAINING: CPT

## 2018-02-15 PROCEDURE — 74011250637 HC RX REV CODE- 250/637: Performed by: ORTHOPAEDIC SURGERY

## 2018-02-15 PROCEDURE — 97116 GAIT TRAINING THERAPY: CPT

## 2018-02-15 PROCEDURE — 74011250637 HC RX REV CODE- 250/637: Performed by: NURSE PRACTITIONER

## 2018-02-15 PROCEDURE — G8989 SELF CARE D/C STATUS: HCPCS

## 2018-02-15 RX ORDER — OXYCODONE HYDROCHLORIDE 5 MG/1
2.5-5 TABLET ORAL
Qty: 20 TAB | Refills: 0 | Status: SHIPPED | OUTPATIENT
Start: 2018-02-15 | End: 2022-05-24

## 2018-02-15 RX ORDER — TRAMADOL HYDROCHLORIDE 50 MG/1
25-50 TABLET ORAL
Qty: 60 TAB | Refills: 0 | Status: SHIPPED | OUTPATIENT
Start: 2018-02-15 | End: 2022-05-24

## 2018-02-15 RX ORDER — ACETAMINOPHEN 500 MG
500 TABLET ORAL
Qty: 60 TAB | Refills: 0 | Status: SHIPPED
Start: 2018-02-15

## 2018-02-15 RX ORDER — DICLOFENAC SODIUM 50 MG/1
50 TABLET, DELAYED RELEASE ORAL
Qty: 40 TAB | Refills: 0 | Status: ON HOLD | OUTPATIENT
Start: 2018-02-15 | End: 2022-04-27

## 2018-02-15 RX ORDER — ASPIRIN 81 MG/1
81 TABLET ORAL EVERY 12 HOURS
Qty: 60 TAB | Refills: 0 | Status: SHIPPED | OUTPATIENT
Start: 2018-02-15 | End: 2022-05-07

## 2018-02-15 RX ADMIN — HYDROCHLOROTHIAZIDE 25 MG: 25 TABLET ORAL at 09:22

## 2018-02-15 RX ADMIN — ACETAMINOPHEN 500 MG: 500 TABLET, FILM COATED ORAL at 09:23

## 2018-02-15 RX ADMIN — TRAMADOL HYDROCHLORIDE 50 MG: 50 TABLET, FILM COATED ORAL at 03:09

## 2018-02-15 RX ADMIN — FAMOTIDINE 20 MG: 20 TABLET, FILM COATED ORAL at 09:23

## 2018-02-15 RX ADMIN — DICLOFENAC SODIUM 50 MG: 50 TABLET, DELAYED RELEASE ORAL at 09:22

## 2018-02-15 RX ADMIN — ACETAMINOPHEN 500 MG: 500 TABLET, FILM COATED ORAL at 06:46

## 2018-02-15 RX ADMIN — POLYETHYLENE GLYCOL 3350 17 G: 17 POWDER, FOR SOLUTION ORAL at 09:21

## 2018-02-15 RX ADMIN — DOCUSATE SODIUM AND SENNOSIDES 1 TABLET: 8.6; 5 TABLET, FILM COATED ORAL at 09:23

## 2018-02-15 RX ADMIN — TRAMADOL HYDROCHLORIDE 50 MG: 50 TABLET, FILM COATED ORAL at 09:22

## 2018-02-15 RX ADMIN — ASPIRIN 81 MG: 81 TABLET, COATED ORAL at 09:21

## 2018-02-15 RX ADMIN — AMLODIPINE BESYLATE 10 MG: 5 TABLET ORAL at 09:21

## 2018-02-15 RX ADMIN — Medication 10 ML: at 06:47

## 2018-02-15 RX ADMIN — POTASSIUM CHLORIDE 40 MEQ: 750 TABLET, EXTENDED RELEASE ORAL at 09:22

## 2018-02-15 NOTE — PROGRESS NOTES
[address]  [facility] Bundled Payment Handoff     FROM:                                TO: Home                                                      ([address] [place] [address] or Facility name)  Joey Iniguez 54  [address] [address]  Dept: [address]: [phone]                                      Room#:  553/01                                                      Discharging Nurse:  Jolanta Alexander RN  Unit DG#:[phone]         SITUATION      ASAScore: ASA 2 - Patient with mild systemic disease with no functional limitations    Admitted:  2/13/2018  Hospital Day: 3      Attending Provider:  Karin Hill MD     Consultations:  None    PCP:  Rufus Rachel MD   [phone]     Admitting Dx:  OA RIGHT KNEE  Primary localized osteoarthritis of right knee  Primary localized osteoarthritis of right knee       Active Problems:    Primary localized osteoarthritis of right knee (2/13/2018)      2 Days Post-Op of   Procedure(s):  RIGHT MEDIAL UNICOMPARTMENTAL KNEE ARTHROPLASTY  (SPINAL W/IVS)   BY: Karin Hill MD             ON: 2/13/2018                  Code Status: Full Code             Advance Directive? Yes Not W Pt (Send w/patient)     Isolation:  There are currently no Active Isolations       MDRO: No current active infections    BACKGROUND     Allergies:   Allergies   Allergen Reactions    Ace Inhibitors Cough    Percocet [Oxycodone-Acetaminophen] Nausea and Vomiting     \"I DON'T TOLERATE THE STRONG OPIODS\"       Past Medical History:   Diagnosis Date    Ankle fracture 3/2000    Right    Arthritis     Cancer ([facility] [address]     SCCA    Hypercholesterolemia     Hypertension     Personal history of kidney stones 1968    Prostatitis 2001    Psychiatric disorder     CLAUSTROPHOBIA; MASK OVER FACE WOULD CAUSE ANXIETY; HAS REACTED TO TIGHT SPACE (UNDER HOUSE)    Unspecified adverse effect of anesthesia     mother difficult to arouse post anesthesia       Past Surgical History:   Procedure Laterality Date    HX COLONOSCOPY      HX HERNIA REPAIR  1816    UMBILICAL    HX ORTHOPAEDIC Right 2000    ORIF ANKLE WITH PINS    HX ORTHOPAEDIC Right 2017    FOOT SURGERY    HX ORTHOPAEDIC Left 1980s    FOOT SURGERY       Prior to Admission Medications   Prescriptions Last Dose Informant Patient Reported? Taking?   amlodipine (NORVASC) 10 mg tablet 2/13/2018 at Unknown time  Yes Yes   Sig: Take 10 mg by mouth daily. atorvastatin (LIPITOR) 10 mg tablet 2/12/2018 at Unknown time  Yes Yes   Sig: Take 10 mg by mouth daily. hydrochlorothiazide (HYDRODIURIL) 25 mg tablet 2/12/2018 at Unknown time  Yes Yes   Sig: Take 25 mg by mouth daily. potassium chloride (K-DUR, KLOR-CON) 20 mEq tablet 2/12/2018 at Unknown time  Yes Yes   Sig: Take 40 mEq by mouth daily. Facility-Administered Medications: None       Vaccinations: There is no immunization history on file for this patient. ASSESSMENT   Age: 65 y.o.              Gender: male        Height: Height: 5' 5\" (165.1 cm)                    Weight:Weight: 73.5 kg (162 lb)     Patient Vitals for the past 8 hrs:   Temp Pulse Resp BP SpO2   02/15/18 0820 97.9 °F (36.6 °C) (!) 58 18 151/70 95 %   02/15/18 0305 98.3 °F (36.8 °C) (!) 58 17 133/82 97 %            Active Orders   Diet    DIET REGULAR       Orientation: Orientation Level: Oriented X4    Active Lines/Drains:  (Peg Tube / Anders / CL or S/L?):no    Urinary Status: Voiding      Last BM: Last Bowel Movement Date: 02/13/18     Skin Integrity: Incision (comment)   Wound Knee Right-DRESSING STATUS: Clean, dry, and intact    Wound Knee Right-DRESSING TYPE: [employer] products    Mobility: Slightly limited   Weight Bearing Status: WBAT (Weight Bearing as Tolerated)      Gait Training  Assistive Device: Walker, rolling, Gait belt  Ambulation - Level of Assistance: Supervision  Distance (ft): 300 Feet (ft)  Stairs - Level of Assistance: Contact guard assistance  Number of Stairs Trained: 14  Rail Use: Right  (+ cane L)     On Anticoagulation? YES  Aspirin                                      Last dose:  2/15/2018at 0921    Pain Medications given:  Tramadol 50 mg                                Last dose: 2/15/2018 at  0922    Lab Results   Component Value Date/Time    Glucose 89 02/14/2018 04:57 AM    Hemoglobin A1c 5.6 01/29/2018 08:49 AM    INR 1.1 01/29/2018 08:49 AM    HGB 13.6 02/15/2018 03:13 AM    HGB 13.6 02/14/2018 04:57 AM    HGB 16.6 01/29/2018 08:49 AM    HGB 16.2 02/28/2009 08:17 AM       Readmission Risks:  Score:         RECOMMENDATION     See After Visit Summary (AVS) for:  · Discharge instructions  · After [address]   · Medication Reconciliation          [facility] Orthopaedic Nurse Navigator  RENEE Garcia, RN-BC       Office  [phone]  Cell      [phone]  Fax      [phone]  [email or web address]             . Kevin

## 2018-02-15 NOTE — PROGRESS NOTES
Occupational Therapy EVALUATION/discharge  Patient: Genesis Vazquez (30 y.o. male)  Date: 2/15/2018  Primary Diagnosis: OA RIGHT KNEE  Primary localized osteoarthritis of right knee  Primary localized osteoarthritis of right knee  Procedure(s) (LRB):  RIGHT MEDIAL UNICOMPARTMENTAL KNEE ARTHROPLASTY  (SPINAL W/IVS) (Right) 2 Days Post-Op   Precautions:   Fall, WBAT    ASSESSMENT:   Based on the objective data described below, the patient presents with overall great performance with self care and functional mobility following admission for right unicompartmental knee replacement. Pt did great with dressing and bathing tasks this morning and was able to complete with min A overall. Pt discussed home safety, toileting, and IADl tasks for home. He reports understanding of all education and training provided. Further skilled acute occupational therapy is not indicated at this time. Discharge Recommendations: None  Further Equipment Recommendations for Discharge: none needed      SUBJECTIVE:   Patient stated I am feeling really good and ready to go home. 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    Hypercholesterolemia     Hypertension     Personal history of kidney stones 1968    Prostatitis 2001    Psychiatric disorder     CLAUSTROPHOBIA; MASK OVER FACE WOULD CAUSE ANXIETY; HAS REACTED TO TIGHT SPACE (UNDER HOUSE)    Unspecified adverse effect of anesthesia     mother difficult to arouse post anesthesia     Past Surgical History:   Procedure Laterality Date    HX COLONOSCOPY      HX HERNIA REPAIR  0032    UMBILICAL    HX ORTHOPAEDIC Right 2000    ORIF ANKLE WITH PINS    HX ORTHOPAEDIC Right 2017    FOOT SURGERY    HX ORTHOPAEDIC Left 1980s    FOOT SURGERY       Prior Level of Function/Environment/Context: pt was independent at home prior to admission.    Expanded or extensive additional review of patient history:     Home Situation  Home Environment: Private residence  # Steps to Enter: 3  Rails to Enter: Yes  Hand Rails : Bilateral  One/Two Story Residence: Two story  # of Interior Steps: 13  Living Alone: No  Support Systems: Christianity / [place] community, Family member(s)  Patient Expects to be Discharged to[de-identified] Private residence  Current DME Used/Available at Home: Cane, straight, Grab bars, Raised toilet seat  Tub or Shower Type: Shower  [x]  Right hand dominant   []  Left hand dominant    EXAMINATION OF PERFORMANCE DEFICITS:  Cognitive/Behavioral Status:  Neurologic State: Alert  Orientation Level: Oriented X4  Cognition: Appropriate safety awareness  Perception: Appears intact  Perseveration: No perseveration noted  Safety/Judgement: Good awareness of safety precautions    Skin: see nursing notes    Edema: none noted    Hearing: Auditory  Auditory Impairment: None    Vision/Perceptual:                           Acuity: Within Defined Limits         Range of Motion:    AROM: Within functional limits  PROM: Within functional limits                      Strength:    Strength: Within functional limits                Coordination:  Coordination: Within functional limits  Fine Motor Skills-Upper: Right Intact; Left Intact    Gross Motor Skills-Upper: Right Intact; Left Intact    Tone & Sensation:    Tone: Normal  Sensation: Intact                      Balance:  Sitting: Intact  Standing: Intact; With support  Standing - Static: Good  Standing - Dynamic : Good    Functional Mobility and Transfers for ADLs:  Bed Mobility:  Supine to Sit:  (recieved in chair)  Sit to Supine:  (remained in chair)    Transfers:  Sit to Stand: Supervision  Stand to Sit: Supervision  Bed to Chair: Supervision  Toilet Transfer : Supervision    ADL Assessment:  Feeding: Supervision    Oral Facial Hygiene/Grooming: Supervision    Bathing: Minimum assistance    Upper Body Dressing: Supervision    Lower Body Dressing: Minimum assistance    Toileting: Supervision                ADL Intervention and task modifications:     Pt was able to complete dressing and bathing this morning without difficulty. IADL training:   Discussed at length precautions with IADL tasks. Discussed body alignment and ensuring pt does not twist hips/knees to ensure proper body alignment. Discussed finger tip rule for daily activities and to use a reacher for all tasks that are out of reach. Pt discussed to avoid tasks such as sweeping, mopping, vacuuming, changing bed linens, carrying a laundry basket, reaching into a low oven, or cleaning showers and toilets. Pt verbalized understanding of instructions. Did encourage pt to stand at sink for grooming, washing dishes, and light meal preparations to increase overall standing tolerance and independence with all activities. Shower transfers:   Discussed technique for walk in shower transfers. Pt educated to step in with strong leg and to come out with operated leg to ensure safety with task. Pt instructed to have a family member or friend with them when they first attempt to get in the shower. Pt educated they can use the walker as needed to step into the shower for stability. Cognitive Retraining  Safety/Judgement: Good awareness of safety precautions    Functional Measure:  Barthel Index:    Bathin  Bladder: 10  Bowels: 10  Groomin  Dressin  Feeding: 10  Mobility: 10  Stairs: 5  Toilet Use: 10  Transfer (Bed to Chair and Back): 10  Total: 75       Barthel and G-code impairment scale:  Percentage of impairment CH  0% CI  1-19% CJ  20-39% CK  40-59% CL  60-79% CM  80-99% CN  100%   Barthel Score 0-100 100 99-80 79-60 59-40 20-39 1-19   0   Barthel Score 0-20 20 17-19 13-16 9-12 5-8 1-4 0      The Barthel ADL Index: Guidelines  1. The index should be used as a record of what a patient does, not as a record of what a patient could do.   2. The main aim is to establish degree of independence from any help, physical or verbal, however minor and for whatever reason. 3. The need for supervision renders the patient not independent. 4. A patient's performance should be established using the best available evidence. Asking the patient, friends/relatives and nurses are the usual sources, but direct observation and common sense are also important. However direct testing is not needed. 5. Usually the patient's performance over the preceding 24-48 hours is important, but occasionally longer periods will be relevant. 6. Middle categories imply that the patient supplies over 50 per cent of the effort. 7. Use of aids to be independent is allowed. Brit Daley., Barthel, D.W. (1464). Functional evaluation: the Barthel Index. [facility] (14)2. Lois Fleming aidee KHRIS Garner, Jeff Noriega., Kyle Ledesma., [place], [address] [place] [address] Ave (1999). Measuring the change indisability after inpatient rehabilitation; comparison of the responsiveness of the Barthel Index and Functional Ismay Measure. Journal of Neurology, Neurosurgery, and Psychiatry, 66(4), 139-814. Yandel Tapia, N.J.A, MOIZ Porras, & Dwayne Celeste, M.A. (2004.) Assessment of post-stroke quality of life in cost-effectiveness studies: The usefulness of the Barthel Index and the EuroQoL-5D. Quality of Life Research, 13, 696-00     G codes: In compliance with CMSs Claims Based Outcome Reporting, the following G-code set was chosen for this patient based on their primary functional limitation being treated: The outcome measure chosen to determine the severity of the functional limitation was the Barthel Index with a score of 75/100 which was correlated with the impairment scale. ?  Self Care:     - CURRENT STATUS: CJ - 20%-39% impaired, limited or restricted    - GOAL STATUS: CJ - 20%-39% impaired, limited or restricted    - D/C STATUS:  CJ - 20%-39% impaired, limited or restricted     Occupational Therapy Evaluation Charge Determination   History Examination Decision-Making   LOW Complexity : Brief history review  LOW Complexity : 1-3 performance deficits relating to physical, cognitive , or psychosocial skils that result in activity limitations and / or participation restrictions  LOW Complexity : No comorbidities that affect functional and no verbal or physical assistance needed to complete eval tasks       Based on the above components, the patient evaluation is determined to be of the following complexity level: LOW   Pain:  Pain Scale 1: Numeric (0 - 10)  Pain Intensity 1: 2  Pain Location 1: Knee  Pain Orientation 1: Right  Pain Description 1: Aching  Pain Intervention(s) 1: Cold pack  Activity Tolerance:   VSS throughout session. After treatment:   [x]  Patient left in no apparent distress sitting up in chair  []  Patient left in no apparent distress in bed  [x]  Call bell left within reach  [x]  Nursing notified  []  Caregiver present  []  Bed alarm activated    COMMUNICATION/EDUCATION:   Communication/Collaboration:  [x]      Home safety education was provided and the patient/caregiver indicated understanding. [x]      Patient/family have participated as able and agree with findings and recommendations. []      Patient is unable to participate in plan of care at this time.   Findings and recommendations were discussed with: Physical Therapist and Registered Nurse    Tashi Park OT  Time Calculation: 13 mins

## 2018-02-15 NOTE — PROGRESS NOTES
Bedside and Verbal shift change report given to Antonio Mart (oncoming nurse) by Luis Damon (offgoing nurse). Report included the following information SBAR, Kardex and MAR.

## 2018-02-15 NOTE — PROGRESS NOTES
Problem: Mobility Impaired (Adult and Pediatric)  Goal: *Acute Goals and Plan of Care (Insert Text)  Physical Therapy Goals  Initiated 2/13/2018    1. Patient will move from supine to sit and sit to supine , scoot up and down and roll side to side in bed with supervision/set-up within 4 days. 2. Patient will perform sit to stand with supervision/set-up within 4 days. 3. Patient will ambulate with minimal assistance/contact guard assist for 100 feet with the least restrictive device within 4 days. 4. Patient will ascend/descend 13 stairs with B handrail(s) with minimal assistance/contact guard assist within 4 days. 5. Patient will perform home exercise program per protocol with independence within 4 days. 6. Patient will demonstrate AROM 0-90 degrees in operative joint within 4 days. physical Therapy TREATMENT  Patient: Tramaine Navas (47 y.o. male)  Date: 2/15/2018  Diagnosis: OA RIGHT KNEE  Primary localized osteoarthritis of right knee  Primary localized osteoarthritis of right knee <principal problem not specified>  Procedure(s) (LRB):  RIGHT MEDIAL UNICOMPARTMENTAL KNEE ARTHROPLASTY  (SPINAL W/IVS) (Right) 2 Days Post-Op  Precautions: Fall, WBAT  Chart, physical therapy assessment, plan of care and goals were reviewed. ASSESSMENT:  Patient received sitting in chair, eager for therapy. Stated pain was manageable today but increased from yesterday. Ambulated 300 ft with step through gait and good gait mechanics, only slightly antalgic. Completed stair training with min cues for sequencing with SPC and use of railings. D/c education provided regarding need for frequent movement at home (1x/waking hour), compliance with HEP, need for ice, and importance of time propped in extension to encourage full ROM. Patient will benefit from HHPT at d/c.     Patient is cleared for discharge from PT standpoint:  YES [x]     NO []     Progression toward goals:  [x]      Improving appropriately and progressing toward goals  []      Improving slowly and progressing toward goals  []      Not making progress toward goals and plan of care will be adjusted     PLAN:  Patient continues to benefit from skilled intervention to address the above impairments. Continue treatment per established plan of care. Discharge Recommendations:  Home Health  Further Equipment Recommendations for Discharge: Owns Rw     SUBJECTIVE:   Patient stated I can walk the hallway from the bedroom to [employer].     OBJECTIVE DATA SUMMARY:   Critical Behavior:  Neurologic State: Alert  Orientation Level: Oriented X4  Cognition: Appropriate decision making  Safety/Judgement: Awareness of environment  Functional Mobility Training:  Transfers:  Sit to Stand: Supervision  Stand to Sit: Supervision  Balance:  Sitting: Intact  Standing: Intact; With support  Ambulation/Gait Training:  Distance (ft): 300 Feet (ft)  Assistive Device: Walker, rolling;Gait belt  Ambulation - Level of Assistance: Supervision  Gait Abnormalities: Antalgic;Decreased step clearance  Base of Support: Narrowed  Speed/Louisa: Pace decreased (<100 feet/min)  Step Length: Right shortened;Left shortened  Stairs:  Number of Stairs Trained: 14  Stairs - Level of Assistance: Contact guard assistance  Rail Use: Right  (+ cane L)  Pain:  Pain Scale 1: Numeric (0 - 10)  Pain Intensity 1: 2  Pain Location 1: Knee  Pain Orientation 1: Right  Pain Description 1: Aching  Pain Intervention(s) 1: Cold pack  Activity Tolerance:   Good  Please refer to the flowsheet for vital signs taken during this treatment.   After treatment:   [x] Patient left in no apparent distress sitting up in chair  [] Patient left in no apparent distress in bed  [x] Call bell left within reach  [x] Nursing notified  [] Caregiver present  [] Bed alarm activated    COMMUNICATION/COLLABORATION:   The patients plan of care was discussed with: Registered Nurse    Reagan Sparrow, PT, DPT   Time Calculation: 20 mins

## 2018-02-15 NOTE — PROGRESS NOTES
NP ORTHO PROGRESS NOTE    Admit date: 2/13/2018  Date of Surgery: 2/13/2018   Procedures: Procedure(s):  RIGHT MEDIAL UNICOMPARTMENTAL KNEE ARTHROPLASTY  (SPINAL W/IVS)  Admitting Physician: Estuardo Holland MD   Surgeon: Alfredo Mejia) and Role:     * Estuardo Holland MD - Primary    Chart/Meds/Labs Reviewed  Current Facility-Administered Medications   Medication Dose Route Frequency    traMADol (ULTRAM) tablet 25 mg  25 mg Oral Q6H PRN    traMADol (ULTRAM) tablet 50 mg  50 mg Oral Q6H PRN    aspirin delayed-release tablet 81 mg  81 mg Oral BID    diclofenac EC (VOLTAREN) tablet 50 mg  50 mg Oral BID    famotidine (PEPCID) tablet 20 mg  20 mg Oral DAILY    oxyCODONE IR (ROXICODONE) tablet 2.5-5 mg  2.5-5 mg Oral Q4H PRN    ondansetron (ZOFRAN ODT) tablet 4 mg  4 mg Oral Q6H PRN    amLODIPine (NORVASC) tablet 10 mg  10 mg Oral DAILY    sodium chloride 0.9 % bolus infusion 500 mL  500 mL IntraVENous ONCE PRN    sodium chloride (NS) flush 5-10 mL  5-10 mL IntraVENous Q8H    sodium chloride (NS) flush 5-10 mL  5-10 mL IntraVENous PRN    acetaminophen (TYLENOL) tablet 500 mg  500 mg Oral Q4HWA    naloxone (NARCAN) injection 0.4 mg  0.4 mg IntraVENous PRN    hydrOXYzine HCl (ATARAX) tablet 10 mg  10 mg Oral Q8H PRN    senna-docusate (PERICOLACE) 8.6-50 mg per tablet 1 Tab  1 Tab Oral BID    polyethylene glycol (MIRALAX) packet 17 g  17 g Oral DAILY    bisacodyl (DULCOLAX) suppository 10 mg  10 mg Rectal DAILY PRN    hydroCHLOROthiazide (HYDRODIURIL) tablet 25 mg  25 mg Oral DAILY    potassium chloride SR (KLOR-CON 10) tablet 40 mEq  40 mEq Oral DAILY    acetaminophen (TYLENOL) tablet 325 mg  325 mg Oral Q4H PRN       Subjective:    Complaints: None. Feels great today. Much more confident. Denies Dizziness, CP, SOB, N/V, Abdominal pain, numbness or tingling of extremities. Able to perform ankle pumps easily. Progressing with mobility. Incisional pain control: Well controlled.   Pain Control:   Pain Assessment  Pain Scale 1: Numeric (0 - 10)  Pain Intensity 1: 2  Pain Onset 1: post op  Pain Location 1: Knee  Pain Orientation 1: Right  Pain Description 1: Aching  Pain Intervention(s) 1: Cold pack    Oral diet: Tolerating diet well    Objective:  General: Alert,Ox4, cooperative, NAD  HEENT: Atraumatic, PERRL, anicteric sclerae  Lungs: Bilateral expansion. Equal excursion. No accessory muscle use. Gastrointestinal:  Soft, non-tender, non-distended  Extremities:  Neurovasc exam WDL. + DP pulses. Sensation intact to light touch. Motor: + DF/PF          Calves non-tender upon palpation or with passive stretch. No significant erythema or swelling. Dressing: clean, dry and intact.     Vital Signs:   Visit Vitals    /70    Pulse (!) 58    Temp 97.9 °F (36.6 °C)    Resp 18    Ht 5' 5\" (1.651 m)    Wt 73.5 kg (162 lb)    SpO2 95%    BMI 26.96 kg/m2    O2 Flow Rate (L/min): 2 l/min O2 Device: Room air   Patient Vitals for the past 24 hrs:   BP Temp Pulse Resp SpO2   02/15/18 0820 151/70 97.9 °F (36.6 °C) (!) 58 18 95 %   02/15/18 0305 133/82 98.3 °F (36.8 °C) (!) 58 17 97 %   18 1958 152/70 98.2 °F (36.8 °C) 60 16 93 %   18 1344 143/76 98.1 °F (36.7 °C) 62 16 92 %   18 1140 128/79 - 60 - -   18 1106 129/79 - - - -   18 1105 147/77 - - - -   18 1100 99/66 - 86 - -   18 1038 143/80 - 60 - -     Temp (24hrs), Av.1 °F (36.7 °C), Min:97.9 °F (36.6 °C), Max:98.3 °F (36.8 °C)      Intake/output-last 8 hours:        Intake/output- 24 hours:   1901 - 02/15 0700  In: 1710.3 [I.V.:1710.3]  Out:  [[facility]:[id number]]    LAB:   Recent Results (from the past 24 hour(s))   HEMOGLOBIN    Collection Time: 02/15/18  3:13 AM   Result Value Ref Range    HGB 13.6 12.1 - 17.0 g/dL      Lab Results   Component Value Date/Time    INR 1.1 2018 08:49 AM     Lab Results   Component Value Date/Time    HGB 13.6 02/15/2018 03:13 AM    HGB 13.6 2018 04:57 AM    HGB 16.6 01/29/2018 08:49 AM    HGB 16.2 02/28/2009 08:17 AM         PT/OT:   Gait:  Gait  Base of Support: Narrowed  Speed/Louisa: Pace decreased (<100 feet/min)  Step Length: Right shortened, Left shortened  Gait Abnormalities: Antalgic, Decreased step clearance  Ambulation - Level of Assistance: Stand-by asssistance  Distance (ft): 250 Feet (ft)  Assistive Device: Walker, rolling, Gait belt                 PATIENT MOBILITY  Bed Mobility Training  Rolling: Stand-by asssistance  Supine to Sit: Modified independent  Sit to Supine: Stand-by asssistance  Transfer Training  Sit to Stand: Stand-by asssistance  Stand to Sit: Stand-by asssistance      Gait Training  Assistive Device: Walker, rolling, Gait belt  Ambulation - Level of Assistance: Stand-by asssistance  Distance (ft): 250 Feet (ft)            Assessment and Plan    Active Problems:    Primary localized osteoarthritis of right knee (2/13/2018)          POD#2 Procedure(s):  RIGHT MEDIAL UNICOMPARTMENTAL KNEE ARTHROPLASTY  (SPINAL W/IVS)  Stable postop. Labs trending as expected. VTE prophylaxis: ASA, Mobilization, Ankle pumping exercises   Weight bearing:  WBAT  Pain management:  Good control. Multi-modal pain plan, see above for medication,  Ice packs & elevation of extremity per orders, active gentle ROM & mobilize frequently for short periods of time. PT  good progress  Wound benign. Neurovascularly intact. Progressing well. No indications of concerns. Continue present plans per orthopedic attending surgeon & interdisciplinary team for joint replacement. Discharge Planning: Home with home care services today after stair climbing with PT.   Plans per Dr. Phyllis Webb    Signed By: Alfonso Cho NP    RN, MSN, MA, Adult NP-BC

## 2018-02-16 ENCOUNTER — PATIENT OUTREACH (OUTPATIENT)
Dept: OTHER | Age: 78
End: 2018-02-16

## 2018-02-16 NOTE — PROGRESS NOTES
This note will not be viewable in [address] Post Discharge Follow-up contact after Joint Replacement    Patient discharged on 2/15/18  By  Tae Currie   following  right unicompartmental knee Arthroplasty. Spoke with patient today, who reports they are \" extremely well; I have no pain at all and I am bending my knee over 90 degrees. \"  Denies Fever, Shortness of Breath or Chest Pain. Home Health has visited. Patient also reports:. Incision  clean, dry, intact  Calf is non-tender,   operative extremity has minimal swelling. Pain is well managed. Discussed use of ice & elevation. is progressing with therapy and is exercising independently. Taking Aspirin for anticoagulation, Tramadol for pain. Patient   is not experiencing symptoms of constipation & urinating without difficulty. Discussed side effects of anticoagulants & pain medications (bleeding/bruising, constipation, lightheaded/dizziness)  Follow up appointment is not scheduled; but patient states plan to schedule. Discussed calling surgeon Dr Maddison Smallwood  for drainage, bleeding, swelling in operative extremity, fever or pain. Discussed calling PCP Dr Frannie Cervantes with other medical issues.

## 2018-03-03 NOTE — DISCHARGE SUMMARY
@8ITLI@ [address] [place] [address]    DISCHARGE SUMMARY     Patient: Renard Benson                             Medical Record Number: [id number]                : 1940  Age: 65 y.o. Admit Date: 2018  Discharge Date: 2/15/2018  Admission Diagnosis: OA RIGHT KNEE  Primary localized osteoarthritis of right knee  Primary localized osteoarthritis of right knee  Discharge Diagnosis: OA RIGHT KNEE  Procedures: Procedure(s):  RIGHT MEDIAL UNICOMPARTMENTAL KNEE ARTHROPLASTY  (SPINAL W/IVS)  Surgeon: Nandini Martinez MD  Anesthesia: spinal  Complications: None     History of Present Illness: The patient is a 78-year-old gentleman with progressive right medial knee pain due to severe medial compartment arthritis. Symptoms have progressed despite comprehensive conservative treatment and he presents for right medial unicompartmental knee replacement. Hospital Course: Renard Benson tolerated the procedure well. He was transferred  to the recovery room in stable condition. After a brief stay the patient was then transferred to the Joint Replacement Unit at [address] [place] [address].  On postoperative day #1, the dressing was clean and dry, he was neurovascularly intact. The patient was afebrile and vital signs were stable. Calves were soft and non-tender bilaterally. He required an additional day for more PT and was discharged to Home in stable condition on postoperative day 2. He was provided with routine postoperative instructions and advised to follow up in my office in 3 weeks following discharge from the hospital.  He was prescribed aspirin for DVT prophylaxis and oxycodone for post-operative pain. Discharge Medications:      ACETAMINOPHEN 500 mg Oral Q4HWA, Schedule for pain control over the next 7-14 days.  Do not exceed 3000 mg in 24 hours.  Obtain over-the-counter.         AMLODIPINE BESYLATE 10 mg DAILY       ASPIRIN 81 mg Oral Q12H, Take either Enteric Coated of Delayed Release Aspirin.  May obtain over-the-counter.         ATORVASTATIN CALCIUM 10 mg Oral DAILY       DICLOFENAC SODIUM 50 mg Oral BID PRN        HYDROCHLOROTHIAZIDE 25 mg DAILY       OXYCODONE HCL 2.5-5 mg Oral Q4H PRN        POTASSIUM CHLORIDE 20 mEq 40 mEq Oral DAILY       TRAMADOL HCL 25-50 mg Oral Q6H PRN         Signed by: Rizwan Wetzel MD  3/3/2018

## 2021-05-20 ENCOUNTER — TRANSCRIBE ORDER (OUTPATIENT)
Dept: SCHEDULING | Age: 81
End: 2021-05-20

## 2021-05-20 DIAGNOSIS — R91.1 PULMONARY NODULE 1 CM OR GREATER IN DIAMETER: Primary | ICD-10-CM

## 2021-05-24 ENCOUNTER — HOSPITAL ENCOUNTER (OUTPATIENT)
Dept: CT IMAGING | Age: 81
Discharge: HOME | End: 2021-05-24
Attending: INTERNAL MEDICINE
Payer: MEDICARE

## 2021-05-24 DIAGNOSIS — R91.1 PULMONARY NODULE 1 CM OR GREATER IN DIAMETER: ICD-10-CM

## 2021-05-24 PROCEDURE — 71250 CT THORAX DX C-: CPT

## 2022-01-01 ENCOUNTER — HOSPITAL ENCOUNTER (EMERGENCY)
Age: 82
Discharge: HOME | End: 2022-01-01
Attending: EMERGENCY MEDICINE
Payer: MEDICARE

## 2022-01-01 VITALS
DIASTOLIC BLOOD PRESSURE: 89 MMHG | RESPIRATION RATE: 18 BRPM | TEMPERATURE: 97.5 F | HEART RATE: 62 BPM | HEIGHT: 66 IN | WEIGHT: 165 LBS | BODY MASS INDEX: 26.52 KG/M2 | OXYGEN SATURATION: 97 % | SYSTOLIC BLOOD PRESSURE: 171 MMHG

## 2022-01-01 DIAGNOSIS — U07.1 COVID-19: Primary | ICD-10-CM

## 2022-01-01 LAB — SARS-COV-2, COV2: NORMAL

## 2022-01-01 PROCEDURE — U0005 INFEC AGEN DETEC AMPLI PROBE: HCPCS

## 2022-01-01 PROCEDURE — 99281 EMR DPT VST MAYX REQ PHY/QHP: CPT

## 2022-01-01 NOTE — DISCHARGE INSTRUCTIONS
Thank you for allowing us to provide you with medical care today. We realize that you have many choices for your emergency care needs. We thank you for choosing [facility]. Please choose us in the future for any continued health care needs. The exam and treatment you received in the emergency department were for an emergent problem and are not intended as complete care. It is fortunate that you follow-up with a doctor. If your symptoms worsen or you do not improve should return to the emergency department. We are available 24 hours a day. Please make an appointment with your health care provider for follow-up of your emergency department visit. Take this sheet with you when you go to your follow-up visit.

## 2022-01-01 NOTE — ED TRIAGE NOTES
Pt reports he would like a COVID test for his coughing, sore throat, sneezing, body aches and fatigue that started on the 28th of December. Pt reports he had a positive exposure on the 26th.

## 2022-01-01 NOTE — ED PROVIDER NOTES
Patient a 23-year-old male with past medical history of arthritis who presents to the emergency department today to obtain a COVID-19 test.  He reports that he had a positive contact on the 28th and started to develop symptoms that day. He reports his symptoms include generalized body aches, fatigue, sore throat, rhinorrhea, and congestion. He denies any shortness of breath or chest pain. Has not had any nausea, vomiting, or diarrhea. He reports eating and drinking well. He reports that he just could not find a test anywhere else and that is only reason he came to the emergency department today. He reports that he is fully vaccinated. He does not think he has been having any fevers. He has been doing salt water gargles for sore throat at home which has helped. He has additionally been taking acetaminophen as needed for body aches which is helped as well. Past Medical History:   Diagnosis Date    Ankle fracture 3/2000    Right    Arthritis     Cancer (HCC)     SCCA    Hypercholesterolemia     Hypertension     Personal history of kidney stones 1968    Prostatitis 2001    Psychiatric disorder     CLAUSTROPHOBIA; MASK OVER FACE WOULD CAUSE ANXIETY; HAS REACTED TO TIGHT SPACE (UNDER HOUSE)    Unspecified adverse effect of anesthesia     mother difficult to arouse post anesthesia       Past Surgical History:   Procedure Laterality Date    HX COLONOSCOPY      HX HERNIA REPAIR  0197    UMBILICAL    HX ORTHOPAEDIC Right 2000    ORIF ANKLE WITH PINS    HX ORTHOPAEDIC Right 2017    FOOT SURGERY    HX ORTHOPAEDIC Left 1980s    FOOT SURGERY         Family History:   Problem Relation Age of Onset    Dementia Mother     Anesth Problems Mother         DIFFICULT TO AROUSE; DID NOT INVOLVE LENGTHY VENTILATOR USE.     Emphysema Father        Social History     Socioeconomic History    Marital status:      Spouse name: Not on file    Number of children: Not on file    Years of education: Not on file    Highest education level: Not on file   Occupational History    Not on file   Tobacco Use    Smoking status: Never Smoker    Smokeless tobacco: Never Used   Substance and Sexual Activity    Alcohol use: Yes     Alcohol/week: 3.3 standard drinks     Types: 4 Glasses of wine per week    Drug use: No    Sexual activity: Not on file   Other Topics Concern    Not on file   Social History Narrative    Not on file     Social Determinants of Health     Financial Resource Strain:     Difficulty of Paying Living Expenses: Not on file   Food Insecurity:     Worried About Running Out of Food in the Last Year: Not on file    Yair of Food in the Last Year: Not on file   Transportation Needs:     Lack of Transportation (Medical): Not on file    Lack of Transportation (Non-Medical): Not on file   Physical Activity:     Days of Exercise per Week: Not on file    Minutes of Exercise per Session: Not on file   Stress:     Feeling of Stress : Not on file   Social Connections:     Frequency of Communication with Friends and Family: Not on file    Frequency of Social Gatherings with Friends and Family: Not on file    Attends Hinduism Services: Not on file    Active Member of [address] [place] [address] or Organizations: Not on file    Attends Club or Organization Meetings: Not on file    Marital Status: Not on file   Intimate Partner Violence:     Fear of Current or Ex-Partner: Not on file    Emotionally Abused: Not on file    Physically Abused: Not on file    Sexually Abused: Not on file   Housing Stability:     Unable to Pay for Housing in the Last Year: Not on file    Number of [place] in the Last Year: Not on file    Unstable Housing in the Last Year: Not on file         ALLERGIES: Ace inhibitors and Percocet [oxycodone-acetaminophen]    Review of Systems   Constitutional: Positive for fatigue. Negative for appetite change, chills, diaphoresis and fever.    HENT: Positive for congestion, rhinorrhea and sore throat. Negative for ear pain and postnasal drip. Eyes: Negative. Respiratory: Positive for cough. Negative for chest tightness and shortness of breath. Cardiovascular: Negative for chest pain, palpitations and leg swelling. Gastrointestinal: Negative for abdominal pain, diarrhea, nausea and vomiting. Endocrine: Negative. Genitourinary: Negative for dysuria, flank pain and frequency. Musculoskeletal: Positive for myalgias. Skin: Negative. Allergic/Immunologic: Negative for immunocompromised state. Neurological: Negative for dizziness, syncope, weakness, light-headedness and headaches. Hematological: Negative. Psychiatric/Behavioral: Negative. Vitals:    01/01/22 1402   BP: (!) 171/89   Pulse: 62   Resp: 18   Temp: 97.5 °F (36.4 °C)   SpO2: 97%   Weight: 74.8 kg (165 lb)   Height: 5' 6\" (1.676 m)            Physical Exam  Vitals and nursing note reviewed. Constitutional:       General: He is not in acute distress. Appearance: Normal appearance. He is normal weight. He is not ill-appearing or toxic-appearing. HENT:      Head: Normocephalic and atraumatic. Nose: Congestion and rhinorrhea present. Mouth/Throat:      Mouth: Mucous membranes are moist.      Pharynx: Oropharynx is clear. No oropharyngeal exudate or posterior oropharyngeal erythema. Eyes:      General:         Right eye: No discharge. Left eye: No discharge. Extraocular Movements: Extraocular movements intact. Conjunctiva/sclera: Conjunctivae normal.      Pupils: Pupils are equal, round, and reactive to light. Cardiovascular:      Rate and Rhythm: Normal rate and regular rhythm. Pulses: Normal pulses. Heart sounds: Normal heart sounds. Pulmonary:      Effort: Pulmonary effort is normal. No respiratory distress. Breath sounds: Normal breath sounds. No wheezing or rales. Abdominal:      General: Abdomen is flat.  Bowel sounds are normal.   Musculoskeletal: General: Normal range of motion. Cervical back: Normal range of motion and neck supple. Skin:     General: Skin is warm and dry. Capillary Refill: Capillary refill takes less than 2 seconds. Neurological:      General: No focal deficit present. Mental Status: He is alert and oriented to person, place, and time. Mental status is at baseline. Psychiatric:         Mood and Affect: Mood normal.         Behavior: Behavior normal.          MDM  Number of Diagnoses or Management Options  COVID-19  Diagnosis management comments: Diagnosis is likely COVID-19 based on symptoms. Ambulatory pulse ox is 97%. Lungs are clear to auscultation. Patient does not appear to be acutely ill or in any acute distress. Will obtain COVID-19 test and discharged home with pulse ox. Educated patient that if oxygen saturation number drops below 90% for more than 60 seconds he needs to return to the emergency department for treatment. Patient verbalized understanding. Discussed my clinical impression(s), any labs and/or radiology results with the patient. I answered any questions and addressed any concerns. Discussed the importance of following up with their primary care physician and/or specialist(s). Discussed signs or symptoms that would warrant return back to the ER for further evaluation. The patient is agreeable with discharge.          Procedures

## 2022-01-01 NOTE — ED NOTES
Patient verbalized understanding of all education reviewed with ER NP and was provided with pulse oximeter prior to leaving ED. Patient ambulatory from ED in no sign of distress or discomfort.

## 2022-01-03 LAB
SARS-COV-2, XPLCVT: NOT DETECTED
SOURCE, COVRS: NORMAL

## 2022-01-04 ENCOUNTER — PATIENT OUTREACH (OUTPATIENT)
Dept: CASE MANAGEMENT | Age: 82
End: 2022-01-04

## 2022-04-26 ENCOUNTER — APPOINTMENT (OUTPATIENT)
Dept: CT IMAGING | Age: 82
DRG: 037 | End: 2022-04-26
Attending: PHYSICIAN ASSISTANT
Payer: MEDICARE

## 2022-04-26 ENCOUNTER — HOSPITAL ENCOUNTER (INPATIENT)
Age: 82
LOS: 11 days | Discharge: NURSING, REHAB OR LONG-TERM CARE | DRG: 037 | End: 2022-05-07
Attending: STUDENT IN AN ORGANIZED HEALTH CARE EDUCATION/TRAINING PROGRAM | Admitting: ANESTHESIOLOGY
Payer: MEDICARE

## 2022-04-26 ENCOUNTER — APPOINTMENT (OUTPATIENT)
Dept: CT IMAGING | Age: 82
DRG: 037 | End: 2022-04-26
Attending: STUDENT IN AN ORGANIZED HEALTH CARE EDUCATION/TRAINING PROGRAM
Payer: MEDICARE

## 2022-04-26 DIAGNOSIS — R40.0 SOMNOLENCE: ICD-10-CM

## 2022-04-26 DIAGNOSIS — Z71.89 GOALS OF CARE, COUNSELING/DISCUSSION: ICD-10-CM

## 2022-04-26 DIAGNOSIS — I63.9 ISCHEMIC STROKE (HCC): Primary | ICD-10-CM

## 2022-04-26 DIAGNOSIS — R06.02 SHORTNESS OF BREATH: ICD-10-CM

## 2022-04-26 DIAGNOSIS — Z51.5 PALLIATIVE CARE BY SPECIALIST: ICD-10-CM

## 2022-04-26 DIAGNOSIS — I63.232 CEREBROVASCULAR ACCIDENT (CVA) DUE TO OCCLUSION OF LEFT CAROTID ARTERY (HCC): ICD-10-CM

## 2022-04-26 DIAGNOSIS — R13.12 OROPHARYNGEAL DYSPHAGIA: ICD-10-CM

## 2022-04-26 DIAGNOSIS — R53.81 DEBILITY: ICD-10-CM

## 2022-04-26 LAB
ALBUMIN SERPL-MCNC: 4.4 G/DL (ref 3.5–5)
ALBUMIN/GLOB SERPL: 1.4 {RATIO} (ref 1.1–2.2)
ALP SERPL-CCNC: 104 U/L (ref 45–117)
ALT SERPL-CCNC: 35 U/L (ref 12–78)
ANION GAP SERPL CALC-SCNC: 11 MMOL/L (ref 5–15)
AST SERPL-CCNC: 21 U/L (ref 15–37)
BASOPHILS # BLD: 0 K/UL (ref 0–0.1)
BASOPHILS NFR BLD: 1 % (ref 0–1)
BILIRUB SERPL-MCNC: 1.1 MG/DL (ref 0.2–1)
BUN SERPL-MCNC: 20 MG/DL (ref 6–20)
BUN/CREAT SERPL: 15 (ref 12–20)
CALCIUM SERPL-MCNC: 10.2 MG/DL (ref 8.5–10.1)
CHLORIDE SERPL-SCNC: 103 MMOL/L (ref 97–108)
CO2 SERPL-SCNC: 26 MMOL/L (ref 21–32)
COVID-19 RAPID TEST, COVR: NOT DETECTED
CREAT SERPL-MCNC: 1.36 MG/DL (ref 0.7–1.3)
DIFFERENTIAL METHOD BLD: ABNORMAL
EOSINOPHIL # BLD: 0.1 K/UL (ref 0–0.4)
EOSINOPHIL NFR BLD: 3 % (ref 0–7)
ERYTHROCYTE [DISTWIDTH] IN BLOOD BY AUTOMATED COUNT: 12.8 % (ref 11.5–14.5)
GLOBULIN SER CALC-MCNC: 3.2 G/DL (ref 2–4)
GLUCOSE BLD STRIP.AUTO-MCNC: 97 MG/DL (ref 65–117)
GLUCOSE SERPL-MCNC: 101 MG/DL (ref 65–100)
HCT VFR BLD AUTO: 48.6 % (ref 36.6–50.3)
HGB BLD-MCNC: 16.7 G/DL (ref 12.1–17)
IMM GRANULOCYTES # BLD AUTO: 0 K/UL (ref 0–0.04)
IMM GRANULOCYTES NFR BLD AUTO: 0 % (ref 0–0.5)
INR PPP: 1.1 (ref 0.9–1.1)
LYMPHOCYTES # BLD: 1.1 K/UL (ref 0.8–3.5)
LYMPHOCYTES NFR BLD: 20 % (ref 12–49)
MCH RBC QN AUTO: 31 PG (ref 26–34)
MCHC RBC AUTO-ENTMCNC: 34.4 G/DL (ref 30–36.5)
MCV RBC AUTO: 90.3 FL (ref 80–99)
MONOCYTES # BLD: 0.9 K/UL (ref 0–1)
MONOCYTES NFR BLD: 16 % (ref 5–13)
NEUTS SEG # BLD: 3.4 K/UL (ref 1.8–8)
NEUTS SEG NFR BLD: 60 % (ref 32–75)
NRBC # BLD: 0 K/UL (ref 0–0.01)
NRBC BLD-RTO: 0 PER 100 WBC
PLATELET # BLD AUTO: 183 K/UL (ref 150–400)
PMV BLD AUTO: 10.4 FL (ref 8.9–12.9)
POTASSIUM SERPL-SCNC: 3.7 MMOL/L (ref 3.5–5.1)
PROT SERPL-MCNC: 7.6 G/DL (ref 6.4–8.2)
PROTHROMBIN TIME: 10.5 SEC (ref 9–11.1)
RBC # BLD AUTO: 5.38 M/UL (ref 4.1–5.7)
SERVICE CMNT-IMP: NORMAL
SODIUM SERPL-SCNC: 140 MMOL/L (ref 136–145)
SOURCE, COVRS: NORMAL
WBC # BLD AUTO: 5.5 K/UL (ref 4.1–11.1)

## 2022-04-26 PROCEDURE — 70496 CT ANGIOGRAPHY HEAD: CPT

## 2022-04-26 PROCEDURE — 74011000636 HC RX REV CODE- 636: Performed by: ANESTHESIOLOGY

## 2022-04-26 PROCEDURE — 74011250636 HC RX REV CODE- 250/636

## 2022-04-26 PROCEDURE — 80053 COMPREHEN METABOLIC PANEL: CPT

## 2022-04-26 PROCEDURE — 99285 EMERGENCY DEPT VISIT HI MDM: CPT

## 2022-04-26 PROCEDURE — 70450 CT HEAD/BRAIN W/O DYE: CPT

## 2022-04-26 PROCEDURE — 74011250636 HC RX REV CODE- 250/636: Performed by: STUDENT IN AN ORGANIZED HEALTH CARE EDUCATION/TRAINING PROGRAM

## 2022-04-26 PROCEDURE — 87635 SARS-COV-2 COVID-19 AMP PRB: CPT

## 2022-04-26 PROCEDURE — 3E03317 INTRODUCTION OF OTHER THROMBOLYTIC INTO PERIPHERAL VEIN, PERCUTANEOUS APPROACH: ICD-10-PCS | Performed by: NURSE PRACTITIONER

## 2022-04-26 PROCEDURE — 77010033678 HC OXYGEN DAILY

## 2022-04-26 PROCEDURE — 37195 THROMBOLYTIC THERAPY STROKE: CPT

## 2022-04-26 PROCEDURE — 74011250636 HC RX REV CODE- 250/636: Performed by: ANESTHESIOLOGY

## 2022-04-26 PROCEDURE — 93005 ELECTROCARDIOGRAM TRACING: CPT

## 2022-04-26 PROCEDURE — 65610000006 HC RM INTENSIVE CARE

## 2022-04-26 PROCEDURE — 0042T CT CODE NEURO PERF W CBF: CPT

## 2022-04-26 PROCEDURE — 74011000636 HC RX REV CODE- 636: Performed by: RADIOLOGY

## 2022-04-26 PROCEDURE — 82962 GLUCOSE BLOOD TEST: CPT

## 2022-04-26 PROCEDURE — 36415 COLL VENOUS BLD VENIPUNCTURE: CPT

## 2022-04-26 PROCEDURE — 85610 PROTHROMBIN TIME: CPT

## 2022-04-26 PROCEDURE — 85025 COMPLETE CBC W/AUTO DIFF WBC: CPT

## 2022-04-26 PROCEDURE — 4A03X5D MEASUREMENT OF ARTERIAL FLOW, INTRACRANIAL, EXTERNAL APPROACH: ICD-10-PCS | Performed by: RADIOLOGY

## 2022-04-26 RX ORDER — ONDANSETRON 2 MG/ML
4 INJECTION INTRAMUSCULAR; INTRAVENOUS
Status: DISCONTINUED | OUTPATIENT
Start: 2022-04-26 | End: 2022-05-07 | Stop reason: HOSPADM

## 2022-04-26 RX ORDER — ACETAMINOPHEN 650 MG/1
650 SUPPOSITORY RECTAL
Status: DISCONTINUED | OUTPATIENT
Start: 2022-04-26 | End: 2022-05-07 | Stop reason: HOSPADM

## 2022-04-26 RX ORDER — DOCUSATE SODIUM 100 MG/1
100 CAPSULE, LIQUID FILLED ORAL 2 TIMES DAILY
Status: DISCONTINUED | OUTPATIENT
Start: 2022-04-26 | End: 2022-04-28

## 2022-04-26 RX ORDER — ATORVASTATIN CALCIUM 40 MG/1
80 TABLET, FILM COATED ORAL
Status: DISCONTINUED | OUTPATIENT
Start: 2022-04-26 | End: 2022-04-28

## 2022-04-26 RX ORDER — ACETAMINOPHEN 325 MG/1
650 TABLET ORAL
Status: DISCONTINUED | OUTPATIENT
Start: 2022-04-26 | End: 2022-05-07 | Stop reason: HOSPADM

## 2022-04-26 RX ORDER — FAMOTIDINE 20 MG/1
20 TABLET, FILM COATED ORAL DAILY
Status: DISCONTINUED | OUTPATIENT
Start: 2022-04-27 | End: 2022-04-27

## 2022-04-26 RX ORDER — ASPIRIN 325 MG
325 TABLET ORAL DAILY
Status: DISCONTINUED | OUTPATIENT
Start: 2022-04-27 | End: 2022-04-27

## 2022-04-26 RX ORDER — SODIUM CHLORIDE 9 MG/ML
50 INJECTION, SOLUTION INTRAVENOUS ONCE
Status: DISPENSED | OUTPATIENT
Start: 2022-04-26 | End: 2022-04-27

## 2022-04-26 RX ORDER — SODIUM CHLORIDE 9 MG/ML
75 INJECTION, SOLUTION INTRAVENOUS CONTINUOUS
Status: DISPENSED | OUTPATIENT
Start: 2022-04-26 | End: 2022-04-27

## 2022-04-26 RX ORDER — FAMOTIDINE 20 MG/1
20 TABLET, FILM COATED ORAL EVERY 12 HOURS
Status: DISCONTINUED | OUTPATIENT
Start: 2022-04-26 | End: 2022-04-26 | Stop reason: DRUGHIGH

## 2022-04-26 RX ADMIN — ALTEPLASE 6.66 MG: KIT at 17:39

## 2022-04-26 RX ADMIN — IOPAMIDOL 100 ML: 755 INJECTION, SOLUTION INTRAVENOUS at 18:07

## 2022-04-26 RX ADMIN — IOPAMIDOL 20 ML: 755 INJECTION, SOLUTION INTRAVENOUS at 20:53

## 2022-04-26 RX ADMIN — ALTEPLASE 59.94 MG: KIT at 17:40

## 2022-04-26 RX ADMIN — SODIUM CHLORIDE 75 ML/HR: 9 INJECTION, SOLUTION INTRAVENOUS at 19:53

## 2022-04-26 RX ADMIN — IOPAMIDOL 100 ML: 755 INJECTION, SOLUTION INTRAVENOUS at 20:53

## 2022-04-26 NOTE — ED NOTES
Dr. Cheri Byrd evaluating patient on EMS stretcher for Code S.  6851: code s level 1 called. CT called. 1705: patient outside of CT on ems stretcher awaiting CT scanner to be cleared  1707: on ct Scanner. 1715: In ED room 11. Dr. Emelyn Hope at bedside evaluating patient. Patient on able to expressive aphasia. Right arm weakness, right leg weakness. 1722: Tele neuro on screen Dr. Kim Luu, Dr. Emelyn Hope at bedside.    1747: taking patient back to CT for CTA on monitor  1808: Returned from CT critical care truck at bedside

## 2022-04-26 NOTE — ED PROVIDER NOTES
Patient arrives as a transfer patient [place] [facility] to be admitted to the ICU after receiving tPA for stroke symptoms. Patient arrives awake alert aphasic with right-sided deficits. I did contact ICU team to let them know that the patient had arrived. They are aware of his status.   I also discussed following up with Dr. Michelle Santo vascular surgery in reference to the internal carotid occlusion

## 2022-04-26 NOTE — ED TRIAGE NOTES
Per [facility] they received a call at [phone] for feeling flushed and expressive aphasia. EMS states when they got on scene at 1622 patient was alert and oriented, carrying a conversation and ambulatory. When EMS arrived at this ED patient appeared to have expressive aphasia, right sided facial droop and right sided weakness.

## 2022-04-26 NOTE — PROGRESS NOTES
Neurocritical Care Code Stroke Documentation    Symptoms:  expressive aphasia, right facial droop, right side weakness, right pronator drift; patient had initial symptoms started at 1608, resolution noted by EMS at at 1622; en route to ER, symptoms recurred. LKW time calculated at 56   Last Known Well: Initial symptoms started at [phone], resolution noted by EMS at at 1622; en route to ER, symptoms recurred. LKW 1622   Medical hx: Active Problems:    CVA (cerebral vascular accident) ([facility] [address] (2021)  HTN, CAD, Cancer, OA, TORIBIO, renal lithiosis   Anticoagulation: ASA 81 mg BID   VAN:   Positive   NIHSS:   1a-LOC:0    1b-Month/Age:2    1c-Open/Close Hand:0    2-Best Gaze:0    3-Visual Fields:0    4-Facial Palsy:1    5a-Left Arm:0    5b-Right Arm:1    6a-Left Le    6b-Right Le    7-Limb Ataxia:2    8-Sensory:0    9-Best Language:2    10-Dysarthria:2    11-Extinction/Inattention:0  TOTAL SCORE:11   Imaging: CT CODE NEURO HEAD WO CONTRAST    Result Date: 2022  1. 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 Justin Gonzalez, the on-call radiologist, at 1415     Final report to follow. Plan:   TPA Candidate: YES    Mechanical thrombectomy Candidate: NO     Discussed with: Pooja Barksdale NP, Miley Jean MD,     Time spent: 50 minutes.      Lacie Gale PA-C  Neurocritical Care Physician Assistant

## 2022-04-26 NOTE — PROGRESS NOTES
Clinical pharmacist note:  Famotidine dose decreased from 20 mg twice daily to 20 mg once daily per renal dosing protocol for creatinine clearance <50 ml/min. Pharmacy to monitor daily and adjust if needed for any significant change in renal function.

## 2022-04-26 NOTE — ED PROVIDER NOTES
The patient is an 54-year-old male history of hypertension hyperlipidemia presenting today with strokelike symptoms. History is difficult to obtain initially as patient is aphasic. Per EMS they got a call at [phone] for an episode of aphasia and feeling flushed. By the time they arrived he was completely asymptomatic with an NIH stroke scale of 0. When they arrived here patient was aphasic again with right-sided weakness including the face arm and leg. I tried to speak to his wife who has memory issues. She was not a great historian, it was difficult to pinpoint the exact time of his last known well initially. I then spoke to a nurse who was actually working with the patient as he was volunteering at a medical clinic when this happened. Reports last known well was 3:30 PM.    Further conversations with his wife indicate no history of prior stroke, anticoagulation use, recent surgeries/lumbar puncture, hx of santos/GI bleed. Past Medical History:   Diagnosis Date    Ankle fracture 3/2000    Right    Arthritis     Cancer (HCC)     SCCA    Hypercholesterolemia     Hypertension     Personal history of kidney stones 1968    Prostatitis 2001    Psychiatric disorder     CLAUSTROPHOBIA; MASK OVER FACE WOULD CAUSE ANXIETY; HAS REACTED TO TIGHT SPACE (UNDER HOUSE)    Unspecified adverse effect of anesthesia     mother difficult to arouse post anesthesia       Past Surgical History:   Procedure Laterality Date    HX COLONOSCOPY      HX HERNIA REPAIR  5590    UMBILICAL    HX ORTHOPAEDIC Right 2000    ORIF ANKLE WITH PINS    HX ORTHOPAEDIC Right 2017    FOOT SURGERY    HX ORTHOPAEDIC Left 1980s    FOOT SURGERY         Family History:   Problem Relation Age of Onset    Dementia Mother     Anesth Problems Mother         DIFFICULT TO AROUSE; DID NOT INVOLVE LENGTHY VENTILATOR USE.     Emphysema Father        Social History     Socioeconomic History    Marital status:      Spouse name: Not on file    Number of children: Not on file    Years of education: Not on file    Highest education level: Not on file   Occupational History    Not on file   Tobacco Use    Smoking status: Never Smoker    Smokeless tobacco: Never Used   Substance and Sexual Activity    Alcohol use: Yes     Alcohol/week: 3.3 standard drinks     Types: 4 Glasses of wine per week    Drug use: No    Sexual activity: Not on file   Other Topics Concern    Not on file   Social History Narrative    Not on file     Social Determinants of Health     Financial Resource Strain:     Difficulty of Paying Living Expenses: Not on file   Food Insecurity:     Worried About Running Out of Food in the Last Year: Not on file    Yair of Food in the Last Year: Not on file   Transportation Needs:     Lack of Transportation (Medical): Not on file    Lack of Transportation (Non-Medical):  Not on file   Physical Activity:     Days of Exercise per Week: Not on file    Minutes of Exercise per Session: Not on file   Stress:     Feeling of Stress : Not on file   Social Connections:     Frequency of Communication with Friends and Family: Not on file    Frequency of Social Gatherings with Friends and Family: Not on file    Attends Yazdanism Services: Not on file    Active Member of [address] [place] [address] or Organizations: Not on file    Attends Club or Organization Meetings: Not on file    Marital Status: Not on file   Intimate Partner Violence:     Fear of Current or Ex-Partner: Not on file    Emotionally Abused: Not on file    Physically Abused: Not on file    Sexually Abused: Not on file   Housing Stability:     Unable to Pay for Housing in the Last Year: Not on file    Number of [place] in the Last Year: Not on file    Unstable Housing in the Last Year: Not on file         ALLERGIES: Ace inhibitors and Percocet [oxycodone-acetaminophen]    Review of Systems   Unable to perform ROS: Acuity of condition       Vitals:    04/26/22 1723 04/26/22 1728 04/26/22 1739 04/26/22 1740   BP: (!) 184/91 (!) 148/84 (!) 165/83 (!) 162/86   Pulse:  76  75   Resp:  19  19   Temp:       SpO2:       Weight:                Physical Exam  Vitals and nursing note reviewed. Constitutional:       Comments: Awake and alert   HENT:      Head: Normocephalic. Nose: Nose normal.   Eyes:      Extraocular Movements: Extraocular movements intact. Pupils: Pupils are equal, round, and reactive to light. Cardiovascular:      Rate and Rhythm: Normal rate. Pulses: Normal pulses. Heart sounds: Normal heart sounds. Pulmonary:      Effort: Pulmonary effort is normal. No respiratory distress. Breath sounds: Normal breath sounds. Abdominal:      Palpations: Abdomen is soft. Tenderness: There is no abdominal tenderness. Musculoskeletal:         General: Normal range of motion. Cervical back: Normal range of motion. Right lower leg: No edema. Left lower leg: No edema. Skin:     General: Skin is warm and dry. Capillary Refill: Capillary refill takes less than 2 seconds. Neurological:      Comments: Severely aphasic  Unable to identify objects  Has difficulty following commands  Right-sided facial droop  Right-sided pronator drift  Right leg drift, does not hit the bed   Psychiatric:      Comments: Difficult to assess          MDM       Procedures      Upon arrival I evaluated the patient. Tele-neuro was consulted. They agreed with tPA which was administered. Prior to giving tPA I did discuss with his wife--discussed risks and benefits and she consented over the phone. I also discussed with his daughter who is an NP who agreed with tPA and is on the way from [facility]. Cardene ordered for elevated blood pressures however never needed it. VAN positive given aphasia, critical care activated lights and sirens.  ETA 14 min    I discussed with neuro interventional, Dr. Grecia Becerra so he can be on the look out for the CTA and can potentially intervene if +LVO    I discussed with the ED staff, Dr. Conrado Lu, accepts patient to ED. I discussed with ICU staff, Dr. Joelle Mcclain will admit to the ICU from the ED or angio    Report given at bedside to critical care team.    Patient transferred emergently to [address] [place] [address] ED. Total critical care time spent exclusive of procedures:  80  Due to a high probability of clinically significant, life threatening deterioration, the patient required my highest level of preparedness to intervene emergently and I personally spent this critical care time directly and personally managing the patient. This critical care time included obtaining a history; examining the patient; pulse oximetry; ordering and review of studies; arranging urgent treatment with development of a management plan; evaluation of patient's response to treatment; frequent reassessment; and, discussions with other providers. This critical care time was performed to assess and manage the high probability of imminent, life-threatening deterioration that could result in multi-organ failure. It was exclusive of separately billable procedures and treating other patients and teaching time.     Sam Robbins, DO

## 2022-04-26 NOTE — ED TRIAGE NOTES
Pt arrived from [place] ED. Critical Care Transport stated VSS. TPA finished en route. Right side weakness, Aphasic, and right side facial droop noted.

## 2022-04-26 NOTE — Clinical Note
Status[de-identified] INPATIENT [101]   Type of Bed: Intensive Care [6]   Inpatient Hospitalization Certified Necessary for the Following Reasons: 4.  Patient requires ICU level of care interventions (further clarification in H&P documentation)   Admitting Diagnosis: CVA (cerebral vascular accident) [facility]) [[id number]]   Admitting Physician: Maykel Sifuentes   Attending Physician: Maykel Sifuentes   Estimated Length of Stay: 5-7 Midnights   Discharge Plan[de-identified] Home with Office Follow-up

## 2022-04-26 NOTE — ED NOTES
TRANSFER - OUT REPORT:    Verbal report given to [facility]. (name) on Luther Bo  being transferred to [facility] (unit) for routine progression of care       Report consisted of patients Situation, Background, Assessment and   Recommendations(SBAR). Information from the following report(s) SBAR, ED Summary and MAR was reviewed with the receiving nurse. Lines:   Peripheral IV 04/26/22 Right Forearm (Active)       Peripheral IV 04/26/22 Left Antecubital (Active)        Opportunity for questions and clarification was provided.       Patient transported with:  3 [facility] critical care truck

## 2022-04-26 NOTE — H&P
History and Physical    Name: Beulah Almendarez   : 1940   MRN: [id number]   Date: 2022      Reason for ICU Admission: Acute ischemic stroke status post tPA    HISTORY OF PRESENT ILLNESS   This is an 79-year-old male who called EMS this evening due to signs and symptoms of a stroke. Per chart review patient was feeling flushed and having expressive aphasia. EMS was on scene around 1622 patient was alert and oriented, carrying conversation and amatory. When EMS arrived to the [facility] ED patient appears to have expressive aphasia, right-sided facial droop and right-sided weakness. In the [facility] emergency room alerted the stroke telemetry physician, who was concern for an ischemic stroke. IV tPA was initiated. ICU was consulted for post tPA care. Triple scans are pending, the neuro interventional surgical team, Dr. Quincy Rice, is aware of the patient and patient may go to the [facility] suite for LVO removal if present. Past medical history on review of chart only is positive for osteoarthritis of the right knee. On review of medications the patient takes aspirin, Tylenol, Voltaren, Roxicodone, tramadol, potassium, Lipitor, Norvasc, and hydrochlorothiazide. Based on the previously mentioned medications, the patient probably also has history of hypertension, as well as hyperlipidemia. On review of laboratory results, patient with increased creatinine, and a slight TORIBIO. IMPRESSION/PROBLEM LIST   Acute ischemic stroke  Status post IV tPA given for stroke  TORIBIO    ASSESSMENT & PLAN     NEUROLOGICAL/PAIN/SEDATION:  Analgesia: Acetaminophen  Sedation: None   tPA given ~ 1745  Possible LVO, intervention pending  Coagulation parameters--PT, PTT, INR   Endovascular intervention (time to groin puncture, recanalization, TICI score) pending  Supplemental Oxygen/Saturation Goal > 94%  Glucose goal: 140 - 180 mg/dL  NS  Monitor for A.  Fib  Monitor for and prevent Fever  HOB > 30 degrees  Avoid insertion of catheters/tubes  No anticoagulant/antiplatelet therapy for 24 hours  Repeat CT or MRI at 24 hours (before starting anticoagulant/antiplatelet medications)  Per NIS expect some fluctuation in his neuro exams. This is not completely unexpected. 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 to see  HEMATOLOGY/ONCOLOGY:  Transfusion Trigger (Hgb): <7 g/dL    GASTROINTESTINAL:  Diet/Feeding:  Pending      RENAL/:  Keep K>4; Mg>2      ENDOCRINE/INTEGUMENTARY:  Glycemic Control: SSI PRN, Prevent hypoglycemia      ICU DAILY CHECKLIST   Indwelling devices:  Tubes: None  Lines: Peripheral IV  Drains: None  DVT Prophylaxis: SCD's or Sequential Compression Device   SUP: Pepcid (famotidine)   PT/OT: PT consulted and on board, OT consulted and on board and Speech therapy consulted and on board   Consultants: Neuro, NeuroIR  Code Status: Full  Goals of Care Discussion with family: Yes   Plan of Care/Family Update: Yes   Discussed Care Plan with Bedside RN: [facility]       Subjective:   04/26/22    Past Medical History:      has a past medical history of Ankle fracture (3/2000), Arthritis, Cancer ([facility] [address] Hypercholesterolemia, Hypertension, Personal history of kidney stones (1968), Prostatitis (2001), Psychiatric disorder, and Unspecified adverse effect of anesthesia. Past Surgical History:      has a past surgical history that includes hx hernia repair (2004); hx orthopaedic (Right, 2000); hx orthopaedic (Right, 2017); hx orthopaedic (Left, 1980s); and hx colonoscopy.     Home Medications:     Prior to Admission medications    Medication Sig Start Date End Date Taking? Authorizing Provider   aspirin delayed-release (ASPIR-81) 81 mg tablet Take 1 Tab by mouth every twelve (12) hours. Take either Enteric Coated of Delayed Release Aspirin. May obtain over-the-counter. Indications: Prevention of Blood Clots after Total Knee Replacement 2/15/18   Leonides Frost NP   acetaminophen (TYLENOL) 500 mg tablet Take 1 Tab by mouth every four (4) hours (while awake). Schedule for pain control over the next 7-14 days. Do not exceed 3000 mg in 24 hours. Obtain over-the-counter. Indications: Postoperative Incisional Knee Pain 2/15/18   Leonides Frost NP   diclofenac EC (VOLTAREN) 50 mg EC tablet Take 1 Tab by mouth two (2) times daily as needed. Indications: OSTEOARTHRITIS, Postoperative Incisional Knee Pain 2/15/18   Leonides Frost NP   oxyCODONE IR (ROXICODONE) 5 mg immediate release tablet Take 0.5-1 Tabs by mouth every four (4) hours as needed (Severe pain not relieved by tramadol (Ultram). Take with food & fluids. ). Max Daily Amount: 30 mg. Indications: Severe Incisional Knee Pain 2/15/18   Leonides Frost NP   traMADol Uriel Dowdy) 50 mg tablet Take 0.5-1 Tabs by mouth every six (6) hours as needed. Max Daily Amount: 200 mg. Indications: Postoperative Incisional Knee Pain 2/15/18   Leonides Frost NP   potassium chloride (K-DUR, KLOR-CON) 20 mEq tablet Take 40 mEq by mouth daily. Provider, Historical   atorvastatin (LIPITOR) 10 mg tablet Take 10 mg by mouth daily. Provider, Historical   amlodipine (NORVASC) 10 mg tablet Take 10 mg by mouth daily. Provider, Historical   hydrochlorothiazide (HYDRODIURIL) 25 mg tablet Take 25 mg by mouth daily. Provider, Historical       Allergies/Social/Family History:      Allergies   Allergen Reactions    Ace Inhibitors Cough    Percocet [Oxycodone-Acetaminophen] Nausea and Vomiting     \"I DON'T TOLERATE THE STRONG OPIODS\"      Social History     Tobacco Use    Smoking status: Never Smoker    Smokeless tobacco: Never Used   Substance Use Topics    Alcohol use: Yes     Alcohol/week: 3.3 standard drinks     Types: 4 Glasses of wine per week      Family History   Problem Relation Age of Onset    Dementia Mother     Anesth Problems Mother         DIFFICULT TO AROUSE; DID NOT INVOLVE LENGTHY VENTILATOR USE.  Emphysema Father        Review of Systems:     A comprehensive review of systems was negative except for that written in the HPI. Objective:     General:  Alert, cooperative, well noursished, well developed, appears stated age   Eyes:  Sclera anicteric. Pupils equally round and reactive to light. Mouth/Throat: Mucous membranes normal, oral pharynx clear   Neck: Supple   Lungs:   Clear to auscultation bilaterally, good effort   CV:  Regular rate and rhythm,no murmur, click, rub or gallop   Abdomen:   Soft, non-tender. bowel sounds normal. non-distended   Extremities: Right arm flaccid, Moves all other extremities to comman   Skin: Skin color, texture, turgor normal. no acute rash or lesions   Lymph nodes: Cervical and supraclavicular normal   Musculoskeletal: No swelling or deformity   Lines/Devices:  Intact, no erythema, drainage or tenderness   Neuro: Alert, aphasic, right arm flaccid, moves all other extremities. Vital Signs:  Visit Vitals  BP (!) 162/86   Pulse 75   Temp 99.1 °F (37.3 °C)   Resp 19   Wt 74 kg (163 lb 2.3 oz)   SpO2 95%   BMI 26.33 kg/m²      O2 Device: None (Room air) Temp (24hrs), Av.1 °F (37.3 °C), Min:99.1 °F (37.3 °C), Max:99.1 °F (37.3 °C)           Intake/Output:   No intake or output data in the 24 hours ending 22 1096    LABS AND  DATA: Personally reviewed 22    MEDS: Personally Reviewed 22    IMAGING: New Imaging Reviewed 22    Chest X-Ray:   CXR Results  (Last 48 hours)    None          CT Scan:   CT Results  (Last 48 hours)               22 1711  CT CODE NEURO HEAD WO CONTRAST Final result    Impression:  1.  No acute intracranial abnormality           Narrative: INDICATION:  Code Stroke        Exam: Unenhanced head CT. No comparisons. 5 mm axial images were obtained from   the skull base to vertex. Sagittal and coronal reformats were performed. CT dose   reduction was achieved through use of a standardized protocol tailored for this   examination and automatic exposure control for dose modulation. Findings: The ventricles and cisterns are of normal size and configuration. There are no extra-axial fluid collections mass lesions or mass effect. There   are no extra-axial fluid collections or midline shift. No evidence of acute   intracranial hemorrhage or acute infarct. Mild low-density in the   periventricular white matter. Paranasal sinuses are aerated and no depressed   skull fracture                 ECHO:  Pending    Multidisciplinary Rounds Completed: Yes    ABCDEF Bundle/Checklist Completed:  Yes    SPECIAL EQUIPMENT  None    DISPOSITION  Stay in ICU    CRITICAL CARE DOCUMENTATION  I had a face to face encounter with the patient, reviewed and interpreted patient data including clinical events, labs, images, vital signs, I/O's, and examined patient. I have discussed the case and the plan and management of the patient's care with the consulting services, the bedside nurses and the respiratory therapist.      NOTE OF PERSONAL INVOLVEMENT IN CARE   This patient has a high probability of imminent, clinically significant deterioration, which requires the highest level of preparedness to intervene urgently. I participated in the decision-making and personally managed or directed the management of the following life and organ supporting interventions that required my frequent assessment to treat or prevent imminent deterioration. I personally spent 55 minutes of critical care time. This is time spent at this critically ill patient's bedside actively involved in patient care as well as the coordination of care.   This does not include any procedural time which has been billed separately.     Rex Rodriguez DO  [facility]  Critical Care Medicine

## 2022-04-27 ENCOUNTER — APPOINTMENT (OUTPATIENT)
Dept: NON INVASIVE DIAGNOSTICS | Age: 82
DRG: 037 | End: 2022-04-27
Attending: ANESTHESIOLOGY
Payer: MEDICARE

## 2022-04-27 ENCOUNTER — APPOINTMENT (OUTPATIENT)
Dept: MRI IMAGING | Age: 82
DRG: 037 | End: 2022-04-27
Attending: ANESTHESIOLOGY
Payer: MEDICARE

## 2022-04-27 ENCOUNTER — APPOINTMENT (OUTPATIENT)
Dept: CT IMAGING | Age: 82
DRG: 037 | End: 2022-04-27
Attending: ANESTHESIOLOGY
Payer: MEDICARE

## 2022-04-27 LAB
ANION GAP SERPL CALC-SCNC: 8 MMOL/L (ref 5–15)
ATRIAL RATE: 78 BPM
BUN SERPL-MCNC: 16 MG/DL (ref 6–20)
BUN/CREAT SERPL: 13 (ref 12–20)
CALCIUM SERPL-MCNC: 10.1 MG/DL (ref 8.5–10.1)
CALCULATED P AXIS, ECG09: 15 DEGREES
CALCULATED R AXIS, ECG10: 27 DEGREES
CALCULATED T AXIS, ECG11: 12 DEGREES
CHLORIDE SERPL-SCNC: 105 MMOL/L (ref 97–108)
CHOLEST SERPL-MCNC: 187 MG/DL
CO2 SERPL-SCNC: 25 MMOL/L (ref 21–32)
CREAT SERPL-MCNC: 1.26 MG/DL (ref 0.7–1.3)
DIAGNOSIS, 93000: NORMAL
ECHO AO ROOT DIAM: 3.6 CM
ECHO AO ROOT INDEX: 1.97 CM/M2
ECHO AV MEAN GRADIENT: 10 MMHG
ECHO AV MEAN VELOCITY: 1.5 M/S
ECHO AV PEAK GRADIENT: 17 MMHG
ECHO AV PEAK VELOCITY: 2.1 M/S
ECHO AV VELOCITY RATIO: 0.9
ECHO AV VTI: 36.7 CM
ECHO LA DIAMETER INDEX: 1.8 CM/M2
ECHO LA DIAMETER: 3.3 CM
ECHO LA TO AORTIC ROOT RATIO: 0.92
ECHO LV E' LATERAL VELOCITY: 8 CM/S
ECHO LV E' SEPTAL VELOCITY: 6 CM/S
ECHO LV FRACTIONAL SHORTENING: 38 % (ref 28–44)
ECHO LV INTERNAL DIMENSION DIASTOLE INDEX: 2.3 CM/M2
ECHO LV INTERNAL DIMENSION DIASTOLIC: 4.2 CM (ref 4.2–5.9)
ECHO LV INTERNAL DIMENSION SYSTOLIC INDEX: 1.42 CM/M2
ECHO LV INTERNAL DIMENSION SYSTOLIC: 2.6 CM
ECHO LV IVSD: 1.5 CM (ref 0.6–1)
ECHO LV MASS 2D: 236.7 G (ref 88–224)
ECHO LV MASS INDEX 2D: 129.4 G/M2 (ref 49–115)
ECHO LV POSTERIOR WALL DIASTOLIC: 1.4 CM (ref 0.6–1)
ECHO LV RELATIVE WALL THICKNESS RATIO: 0.67
ECHO LVOT AV VTI INDEX: 0.93
ECHO LVOT MEAN GRADIENT: 8 MMHG
ECHO LVOT PEAK GRADIENT: 15 MMHG
ECHO LVOT PEAK VELOCITY: 1.9 M/S
ECHO LVOT VTI: 34 CM
ECHO MV A VELOCITY: 1.11 M/S
ECHO MV AREA PHT: 3.7 CM2
ECHO MV E DECELERATION TIME (DT): 206.9 MS
ECHO MV E VELOCITY: 0.65 M/S
ECHO MV E/A RATIO: 0.59
ECHO MV E/E' LATERAL: 8.13
ECHO MV E/E' RATIO (AVERAGED): 9.48
ECHO MV E/E' SEPTAL: 10.83
ECHO MV PRESSURE HALF TIME (PHT): 60 MS
ECHO PV MAX VELOCITY: 1.4 M/S
ECHO PV PEAK GRADIENT: 8 MMHG
ECHO RV TAPSE: 1.7 CM (ref 1.7–?)
ECHO TV REGURGITANT MAX VELOCITY: 2.8 M/S
ECHO TV REGURGITANT PEAK GRADIENT: 31 MMHG
ERYTHROCYTE [DISTWIDTH] IN BLOOD BY AUTOMATED COUNT: 12.8 % (ref 11.5–14.5)
EST. AVERAGE GLUCOSE BLD GHB EST-MCNC: 117 MG/DL
GLUCOSE SERPL-MCNC: 146 MG/DL (ref 65–100)
HBA1C MFR BLD: 5.7 % (ref 4–5.6)
HCT VFR BLD AUTO: 45.6 % (ref 36.6–50.3)
HDLC SERPL-MCNC: 66 MG/DL
HDLC SERPL: 2.8 {RATIO} (ref 0–5)
HGB BLD-MCNC: 15.4 G/DL (ref 12.1–17)
LDLC SERPL CALC-MCNC: 107.2 MG/DL (ref 0–100)
MAGNESIUM SERPL-MCNC: 2.1 MG/DL (ref 1.6–2.4)
MCH RBC QN AUTO: 31 PG (ref 26–34)
MCHC RBC AUTO-ENTMCNC: 33.8 G/DL (ref 30–36.5)
MCV RBC AUTO: 91.9 FL (ref 80–99)
NRBC # BLD: 0 K/UL (ref 0–0.01)
NRBC BLD-RTO: 0 PER 100 WBC
P-R INTERVAL, ECG05: 164 MS
PHOSPHATE SERPL-MCNC: 2.9 MG/DL (ref 2.6–4.7)
PLATELET # BLD AUTO: 165 K/UL (ref 150–400)
PMV BLD AUTO: 10.8 FL (ref 8.9–12.9)
POTASSIUM SERPL-SCNC: 3.1 MMOL/L (ref 3.5–5.1)
Q-T INTERVAL, ECG07: 388 MS
QRS DURATION, ECG06: 72 MS
QTC CALCULATION (BEZET), ECG08: 442 MS
RBC # BLD AUTO: 4.96 M/UL (ref 4.1–5.7)
SODIUM SERPL-SCNC: 138 MMOL/L (ref 136–145)
T3FREE SERPL-MCNC: 2.4 PG/ML (ref 2.2–4)
T4 FREE SERPL-MCNC: 1.1 NG/DL (ref 0.8–1.5)
TRIGL SERPL-MCNC: 69 MG/DL (ref ?–150)
TSH SERPL DL<=0.05 MIU/L-ACNC: 1.42 UIU/ML (ref 0.36–3.74)
VENTRICULAR RATE, ECG03: 78 BPM
VLDLC SERPL CALC-MCNC: 13.8 MG/DL
WBC # BLD AUTO: 11.3 K/UL (ref 4.1–11.1)

## 2022-04-27 PROCEDURE — 84100 ASSAY OF PHOSPHORUS: CPT

## 2022-04-27 PROCEDURE — 36415 COLL VENOUS BLD VENIPUNCTURE: CPT

## 2022-04-27 PROCEDURE — 84443 ASSAY THYROID STIM HORMONE: CPT

## 2022-04-27 PROCEDURE — 74011250637 HC RX REV CODE- 250/637: Performed by: ANESTHESIOLOGY

## 2022-04-27 PROCEDURE — 74011250637 HC RX REV CODE- 250/637: Performed by: NURSE PRACTITIONER

## 2022-04-27 PROCEDURE — 99223 1ST HOSP IP/OBS HIGH 75: CPT | Performed by: PSYCHIATRY & NEUROLOGY

## 2022-04-27 PROCEDURE — 83036 HEMOGLOBIN GLYCOSYLATED A1C: CPT

## 2022-04-27 PROCEDURE — 97535 SELF CARE MNGMENT TRAINING: CPT

## 2022-04-27 PROCEDURE — 77010033678 HC OXYGEN DAILY

## 2022-04-27 PROCEDURE — 97530 THERAPEUTIC ACTIVITIES: CPT

## 2022-04-27 PROCEDURE — 74011250636 HC RX REV CODE- 250/636: Performed by: NURSE PRACTITIONER

## 2022-04-27 PROCEDURE — 80048 BASIC METABOLIC PNL TOTAL CA: CPT

## 2022-04-27 PROCEDURE — 93306 TTE W/DOPPLER COMPLETE: CPT

## 2022-04-27 PROCEDURE — 84439 ASSAY OF FREE THYROXINE: CPT

## 2022-04-27 PROCEDURE — 97161 PT EVAL LOW COMPLEX 20 MIN: CPT

## 2022-04-27 PROCEDURE — 74011250636 HC RX REV CODE- 250/636: Performed by: ANESTHESIOLOGY

## 2022-04-27 PROCEDURE — 92610 EVALUATE SWALLOWING FUNCTION: CPT

## 2022-04-27 PROCEDURE — 84481 FREE ASSAY (FT-3): CPT

## 2022-04-27 PROCEDURE — 85027 COMPLETE CBC AUTOMATED: CPT

## 2022-04-27 PROCEDURE — 70450 CT HEAD/BRAIN W/O DYE: CPT

## 2022-04-27 PROCEDURE — 70551 MRI BRAIN STEM W/O DYE: CPT

## 2022-04-27 PROCEDURE — 65610000006 HC RM INTENSIVE CARE

## 2022-04-27 PROCEDURE — 80061 LIPID PANEL: CPT

## 2022-04-27 PROCEDURE — 83735 ASSAY OF MAGNESIUM: CPT

## 2022-04-27 PROCEDURE — 97165 OT EVAL LOW COMPLEX 30 MIN: CPT

## 2022-04-27 RX ORDER — ASPIRIN 325 MG
325 TABLET ORAL EVERY EVENING
Status: DISCONTINUED | OUTPATIENT
Start: 2022-04-27 | End: 2022-04-27

## 2022-04-27 RX ORDER — CLOPIDOGREL BISULFATE 75 MG/1
300 TABLET ORAL ONCE
Status: DISCONTINUED | OUTPATIENT
Start: 2022-04-27 | End: 2022-04-27

## 2022-04-27 RX ORDER — POTASSIUM CHLORIDE 7.45 MG/ML
10 INJECTION INTRAVENOUS
Status: COMPLETED | OUTPATIENT
Start: 2022-04-27 | End: 2022-04-27

## 2022-04-27 RX ORDER — CLOPIDOGREL BISULFATE 75 MG/1
300 TABLET ORAL ONCE
Status: COMPLETED | OUTPATIENT
Start: 2022-04-27 | End: 2022-04-27

## 2022-04-27 RX ORDER — ASPIRIN 325 MG
325 TABLET ORAL DAILY
Status: DISCONTINUED | OUTPATIENT
Start: 2022-04-27 | End: 2022-04-28

## 2022-04-27 RX ORDER — ASPIRIN 325 MG
325 TABLET ORAL DAILY
Status: DISCONTINUED | OUTPATIENT
Start: 2022-04-27 | End: 2022-04-27

## 2022-04-27 RX ORDER — CLOPIDOGREL BISULFATE 75 MG/1
75 TABLET ORAL DAILY
Status: DISCONTINUED | OUTPATIENT
Start: 2022-04-28 | End: 2022-04-28

## 2022-04-27 RX ADMIN — POTASSIUM CHLORIDE 10 MEQ: 7.46 INJECTION, SOLUTION INTRAVENOUS at 11:45

## 2022-04-27 RX ADMIN — ATORVASTATIN CALCIUM 80 MG: 40 TABLET, FILM COATED ORAL at 21:00

## 2022-04-27 RX ADMIN — POTASSIUM CHLORIDE 10 MEQ: 7.46 INJECTION, SOLUTION INTRAVENOUS at 11:48

## 2022-04-27 RX ADMIN — POTASSIUM CHLORIDE 10 MEQ: 7.46 INJECTION, SOLUTION INTRAVENOUS at 09:11

## 2022-04-27 RX ADMIN — ASPIRIN 325 MG ORAL TABLET 325 MG: 325 PILL ORAL at 18:51

## 2022-04-27 RX ADMIN — CLOPIDOGREL BISULFATE 300 MG: 75 TABLET ORAL at 18:50

## 2022-04-27 RX ADMIN — POTASSIUM CHLORIDE 10 MEQ: 7.46 INJECTION, SOLUTION INTRAVENOUS at 09:05

## 2022-04-27 RX ADMIN — DOCUSATE SODIUM 100 MG: 100 CAPSULE, LIQUID FILLED ORAL at 18:50

## 2022-04-27 RX ADMIN — POTASSIUM CHLORIDE 10 MEQ: 7.46 INJECTION, SOLUTION INTRAVENOUS at 06:58

## 2022-04-27 RX ADMIN — SODIUM CHLORIDE 75 ML/HR: 9 INJECTION, SOLUTION INTRAVENOUS at 05:35

## 2022-04-27 RX ADMIN — ONDANSETRON 4 MG: 2 INJECTION INTRAMUSCULAR; INTRAVENOUS at 15:15

## 2022-04-27 NOTE — PROGRESS NOTES
Problem: Dysphagia (Adult)  Goal: *Acute Goals and Plan of Care (Insert Text)  Description: Speech Therapy Goals  Initiated 4/27/2022    1. Patient will tolerate baseline diet without adverse effects within 7 days. Outcome: Progressing Towards Goal     Problem: Communication Impaired (Adult)  Goal: *Acute Goals and Plan of Care (Insert Text)  Description: Speech Therapy Goals  Initiated 4/27/2022     1. Patient will participate in repetition tasks with 50% accuracy within 7 days. 2. Patient will participate in automatic speech tasks with 50% accuracy within 7 days. Outcome: Progressing Towards Goal     SPEECH LANGUAGE PATHOLOGY BEDSIDE SWALLOW AND SPEECH EVALUATIONS  Patient: Julia Miles (45 y.o. male)  Date: 4/27/2022  Primary Diagnosis: CVA (cerebral vascular accident) [facility]) [I63.9]        Precautions:   Fall (-170)    ASSESSMENT :  Based on the objective data described below, the patient presents with elevated risk for prandial aspiration given acute neuro onset with facial weakness and drowsiness. Recommend pt initiate diet as outlined below with hopes to advance diet as neuro recovery ensues. Patient also presents with a severe expressive-receptive aphasia on this date consistent with a global aphasia. No verbalizations appreciated and pt with incorrect answers to simple yes/no questions. Will benefit from continued language treatment. Patient will benefit from skilled intervention to address the above impairments. Patients rehabilitation potential is considered to be Fair     PLAN :  Recommendations and Planned Interventions:  --pureed diet/thin liquids  --good oral care  --upright during meals  --present to pt's left (stronger) side    Frequency/Duration: Patient will be followed by speech-language pathology 3 times a week to address goals. Discharge Recommendations: To Be Determined     SUBJECTIVE:   Patient did not verbalize.     OBJECTIVE:     Past Medical History:   Diagnosis Date    Ankle fracture 3/2000    Right    Arthritis     Cancer ([facility] [address]     SCCA    Hypercholesterolemia     Hypertension     Personal history of kidney stones 1968    Prostatitis 2001    Psychiatric disorder     CLAUSTROPHOBIA; MASK OVER FACE WOULD CAUSE ANXIETY; HAS REACTED TO TIGHT SPACE (UNDER HOUSE)    Unspecified adverse effect of anesthesia     mother difficult to arouse post anesthesia     Past Surgical History:   Procedure Laterality Date    HX COLONOSCOPY      HX HERNIA REPAIR  6995    UMBILICAL    HX ORTHOPAEDIC Right 2000    ORIF ANKLE WITH PINS    HX ORTHOPAEDIC Right 2017    FOOT SURGERY    HX ORTHOPAEDIC Left 1980s    FOOT SURGERY     Prior Level of Function/Home Situation:   Home Situation  Support Systems: Child(kai) (Daughter Tonny Litter [phone])  Diet prior to admission: regular diet/thin liquids  Current Diet:  NPO   Cognitive and Communication Status:  Neurologic State: Drowsy  Orientation Level: Unable to verbalize  Cognition: Decreased command following  Perception: Cues to attend to right side of body,Cues to attend right visual field,Tactile,Verbal,Visual  Perseveration: No perseveration noted  Safety/Judgement: Decreased awareness of environment,Decreased awareness of need for assistance,Decreased awareness of need for safety,Decreased insight into deficits,Fall prevention  Swallowing Evaluation:   Oral Assessment:  Oral Assessment  Labial: Decreased seal;Right droop  Lingual:  (couldn't follow commands)  P.O. Trials:  Multiple swallows with thin liquids which could be related to age. No overt s/s of aspiration. Drowsy and with facial weakness. NOMS:   The NOMS functional outcome measure was used to quantify this patient's level of swallowing impairment.   Based on the NOMS, the patient was determined to be at level 3 for swallow function       NOMS Swallowing Levels:  Level 1 (CN): NPO  Level 2 (CM): NPO but takes consistency in therapy  Level 3 (CL): Takes less than 50% of nutrition p.o. and continues with nonoral feedings; and/or safe with mod cues; and/or max diet restriction  Level 4 (CK): Safe swallow but needs mod cues; and/or mod diet restriction; and/or still requires some nonoral feeding/supplements  Level 5 (CJ): Safe swallow with min diet restriction; and/or needs min cues  Level 6 (CI): Independent with p.o.; rare cues; usually self cues; may need to avoid some foods or needs extra time  Level 7 ([address] [place] [address]): Independent for all p.o.  BERNARDO. (2003). National Outcomes Measurement System (NOMS): Adult Speech-Language Pathology User's Guide. Speech/Language Evaluation  Motor Speech:  Oral-Motor Structure/Motor Speech  Labial: Decreased seal;Right droop  Lingual:  (couldn't follow commands)  Language Comprehension and Expression:  Repetition: 0% accuracy  Automatic Speech: No attempts. Simple Yes/No Questions: 50% accuracy    NOMS:   The NOMS functional outcome measure was used to quantify this patient's level of expressive language impairment. Based on the NOMS, the patient was determined to be at level 1 for spoken language expression. NOMS Spoken Language Expression:  Level 1 (CN): Verbalizations not meaningful to anyone. Level 2 (CM): Few attempts accurate/appropriate. Max cues to elicit automatic/imitative words/phrases. Level 3 (CL): Communication partner responsible for communication; Mod cues for words/phrases meaningful in context  Level 4 (CK): Initiate during simple, routine activities w/familiar partner. Mod cues to produce simple sentences  Level 5 (Mary English): Initiates communication with familiar and unfamiliar partners. Min cues for complex sentences. Level 6 (CI): Communicates for most activities. Some limitations still present for vocational/social activities. Rarely cued for complex info  Level 7 Critical access hospital): Independent communication. TUNG (2003). National Outcomes Measurement System (NOMS): Adult Speech-Language Pathology User's Guide.          Pain:  Pain Scale 1: Adult Nonverbal Pain Scale  Pain Intensity 1: 0       After treatment:   Call bell within reach and Nursing notified    COMMUNICATION/EDUCATION:       The patient's plan of care including recommendations, planned interventions, and recommended diet changes were discussed with: Registered nurse. Patient is unable to participate in goal setting and plan of care.     Thank you for this referral.  RACHID Contreras  Time Calculation: 16 mins

## 2022-04-27 NOTE — PROGRESS NOTES
Notified by RN of onset of vomiting after patient appeared to be grasping his head in pain. No other acute neuro changes. Stat head CT has been ordered, neurology NP at bedside advised she would notify Dr. Mar Latif and review CT results.     Lianna Mckenzie NP

## 2022-04-27 NOTE — PROGRESS NOTES
SLP Contact Note    SLP evaluation complete. Recommend puree/thin liquids. Global aphasia noted. Full note to follow.       Thank you,  ABIOLA Pineda.Ed, [address] [facility]  Speech-Language Pathologist

## 2022-04-27 NOTE — PROGRESS NOTES
Neurocritical Care Brief Progress Note:    Nursing came to me stating patient vomited and was putting his hand to his head like he had a headache. No acute neuro changes. Patient had tPA less than 24 hours prior. Recommended stat CTH, which was completed and was stable. No hemorrhage. He is due for his MRI this evening. After MRI this evening will load with aspirin 325 mg and Plavix 300 mg per Dr. Karine Patrick recommendations. No neuro-endovascular intervention available for left ICA stenosis. NIS recommended consulting Vascular Surgery. Spoke to [employer] and they will consult. Addendum: 18:23. MRI completed showing numerous foci of acute or subacute nonhemorrhagic ischemia throughout the left MCA territory and the left BG regions without associated mass effect. Stable age-related changes as well. Give aspirin and Plavix as planned this evening.      Vianney Simms NP  Neurocritical Care Nurse Practitioner  [phone]

## 2022-04-27 NOTE — PROGRESS NOTES
Reviewed chart for transitions of care, and discussed in rounds. CM met with patient's daughter Elvia Lesches [phone] at bedside to explain role and offer support. Confirmed demographics. Baseline:   ADLs/IDALS:Independent  Previous Home Health:None  Previous SNF/IPR:None  ER Contact: Daughter Elvia Lesches [phone]    Patient lives in a 2  level house with 6 steps to enter. Patient is independent with ADLs/IDALs  Patient has no previous equipment needs, had [facility] s/p knee replacement about 5 years ago. Patient's preferred pharmacy is [facility]  located in [place]   Will need BLS transport at discharge  Reason for Admission:  CVA                     RUR Score:   11%                  Plan for utilizing home health:   NA      PCP: First and Last name:  Thomas Umaña MD     Name of Practice:    Are you a current patient: Yes/No: Yes   Approximate date of last visit:    Can you participate in a virtual visit with your PCP: No                    Current Advanced Directive/Advance Care Plan: Full Code      Healthcare Decision Maker:   Click here to complete [address] including selection of the Healthcare Decision Maker Relationship (ie \"Primary\")             Primary Decision Maker: Uriel Matthews - Daughter - [phone]                  Transition of Care Plan:           Patient admitted with acute ischemic stroke, received tPA. Patient is globally aphasic with a right facial droop and right sided weakness. Care manager met with patient's daughter Elvia Lesches [phone] to introduce self and explain role. Patient was independent prior to admission. Per daughter patient's wife Tate Rosales has dementia. AMD on file listing Wife Tate Rosales as primary MPOA and daughter Daily Wolf as secondary. CM confirmed demographic information. Therapy is recommending inpatient rehab. CM provided daughter with a list of facilities. Will follow up for choice.   Vaishnavi Serrano RN,Care Management     Care Management Interventions  PCP Verified by CM: Yes (Dr Alexys Aleman)  [address] Michele. Marshall Cook: Yes  Discharge Durable Medical Equipment: No  Physical Therapy Consult: Yes  Occupational Therapy Consult: Yes  Speech Therapy Consult: Yes  Support Systems: Child(kai) (Daughter Booker Cates [phone])     Medicare pt has received, reviewed, and signed 1 st  IM letter informing them of their right to appeal the discharge. Signed copy has been placed on pt bedside chart.

## 2022-04-27 NOTE — CONSULTS
Brief NIS Note    I was contacted yesterday by Dr. Latanya Zaragoza once the patient's noncontrast head CT was completed. The decisions to administer tPA and transfer the patient to [facility] had already been made and transport was activated. CTA was pending. I reviewed the noncontrast head CT and subsequently the CTA head and neck via [facility]. The CTA head and neck showed no evidence of intracranial large vessel occlusion. 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 I called the [facility] ED charge nurse at 6:27 PM on 4/26/2022 and relayed the above findings. I advised to consult vascular surgery for carotid endarterectomy. Given the large intraluminal thrombus causing symptomatic critical stenosis of the proximal left ICA, carotid endarterectomy (once safe to do so after administration of tPA) would be preferred to endovascular treatment. I also relayed these findings and recommendations to MAIN Bear. Since no ED doctor was assigned yet, I advised the charge nurse that I would be available to speak with anyone if they had any questions. After admission to the hospital, patient's neurological exam had apparently declined. Another Code S was called. I reviewed the Code S imaging via [facility], which remained unchanged. CTP showed mild ischemia in the left MCA territory. I reviewed the findings with PRECIOUS Lopez and recommended permissive hypertension with systolic blood pressure goal of 150-170, IV fluid hydration, and vascular surgery consultation. Since there is no acute intracranial large vessel occlusion and since the critical left carotid stenosis is caused by a large intraluminal thrombus, endovascular treatment is not recommended. Again, recommend permissive hypertension, vascular surgery consult, IV fluid hydration. Agree with DAPT as per Neurology recommendations. Recommendations relayed ICU NP Katiana Sames by MAIN Long.

## 2022-04-27 NOTE — PROGRESS NOTES
Physical Therapy:     Orders received, chart reviewed and patient evaluated by physical therapy. Pending progression with skilled acute physical therapy, recommend:  Therapy 3 hours per day 5-7 days per week    Full evaluation to follow.      Ulises Mohan, PT, DPT

## 2022-04-27 NOTE — PROGRESS NOTES
Neurocritical Care Code Stroke Documentation    Symptoms:   decline: no longer able to lift right arm   Last Known Well: 55   Medical hx: Active Problems:    CVA (cerebral vascular accident) ([facility] [address] (2021)  Left ICA occlusion     Anticoagulation: 1622   VAN:   Positive   NIHSS:   1a-LOC:0    1b-Month/Age:2    1c-Open/Close Hand:0    2-Best Gaze:0    3-Visual Fields:0    4-Facial Palsy:1    5a-Left Arm:0    5b-Right Arm:2    6a-Left Le    6b-Right Le    7-Limb Ataxia:2    8-Sensory:0    9-Best Language:2    10-Dysarthria:2    11-Extinction/Inattention:0  TOTAL SCORE:12   Imaging: CT CODE NEURO HEAD WO CONTRAST    Result Date: 2022  Findings concerning for acute ischemia left frontal region. CT CODE NEURO HEAD WO CONTRAST    Result Date: 2022  1. No acute intracranial abnormality      CT CODE NEURO PERF W CBF    Result Date: 2022  Diminished left MCA territory blood flow and perfusion. Small calculated perfusion mismatch. Plan:   TPA Candidate: already given    Mechanical thrombectomy Candidate: NO  Will continue with IVF  cc/Hr  Will start DAPT tomorrow, after the tPA protocol is complete  Permissive HTN: 150-170 ABP goal     Discussed with: Dr. Erna Singh, Dr. Dimple Da Silva    Time spent: 35 minutes.      Oral TALHA Petty  Neurocritical Care Physician Assistant

## 2022-04-27 NOTE — PROGRESS NOTES
2330: Patient admitted into ICU. On assessment patient presents with a right facial droop, unable to lift right arm and leg to gravity/non purposeful. Withdraws all extremities. He exhibits both expressive and receptive aphasia. 2345: Patients vomits. 0000: Intensivist NP at bedside to assess patient. Primary Nurse Jamarcus Lau and Genoveva Shah RN performed a dual skin assessment on this patient Impairment noted- see wound doc flow sheet  R shoulder bruise, red blanchable bottom, and scattered abdominal scarring.

## 2022-04-27 NOTE — PROGRESS NOTES
Consult received and chart reviewed. 79year old male presented with expressive aphasia, facial droop, and right sided weakness yesterday. 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 Patient DNI/DNR. Received TPA. Recommend starting heparin drip when able. Will check carotid duplex and vascular team to follow up tomorrow. MRI pending.

## 2022-04-27 NOTE — PROGRESS NOTES
History and Physical    Name: Loan Lora   : 1940   MRN: [id number]   Date: 2022      Reason for ICU Admission: Acute ischemic stroke status post tPA    HISTORY OF PRESENT ILLNESS   This is an 24-year-old male who called EMS this evening due to signs and symptoms of a stroke. Per chart review patient was feeling flushed and having expressive aphasia. EMS was on scene around 1622 patient was alert and oriented, carrying conversation and amatory. When EMS arrived to the short pump ED patient appears to have expressive aphasia, right-sided facial droop and right-sided weakness. In the short pump emergency room alerted the stroke telemetry physician, who was concern for an ischemic stroke. IV tPA was initiated. ICU was consulted for post tPA care. Triple scans are pending, the neuro interventional surgical team, Dr. Puneet Currie, is aware of the patient and patient may go to the [facility] suite for LVO removal if present. Past medical history on review of chart only is positive for osteoarthritis of the right knee. On review of medications the patient takes aspirin, Tylenol, Voltaren, Roxicodone, tramadol, potassium, Lipitor, Norvasc, and hydrochlorothiazide. Based on the previously mentioned medications, the patient probably also has history of hypertension, as well as hyperlipidemia. On review of laboratory results, patient with increased creatinine, and a slight TORIBIO. IMPRESSION/PROBLEM LIST   Acute ischemic stroke  Status post IV tPA given for stroke  TORIBIO    ASSESSMENT & PLAN     NEUROLOGICAL/PAIN/SEDATION:  Analgesia: Acetaminophen  Sedation: None   tPA given ~ 1745  Possible LVO, intervention pending  Coagulation parameters--PT, PTT, INR   Endovascular intervention (time to groin puncture, recanalization, TICI score) pending  Supplemental Oxygen/Saturation Goal > 94%  Glucose goal: 140 - 180 mg/dL  NS  Monitor for A.  Fib  Monitor for and prevent Fever  HOB > 30 degrees  Avoid insertion of catheters/tubes  No anticoagulant/antiplatelet therapy for 24 hours  Repeat CT or MRI at 24 hours (before starting anticoagulant/antiplatelet medications)  Per NIS expect some fluctuation in his neuro exams. This is not completely unexpected. 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 to see  HEMATOLOGY/ONCOLOGY:  Transfusion Trigger (Hgb): <7 g/dL    GASTROINTESTINAL:  Diet/Feeding:  Pending      RENAL/:  Keep K>4; Mg>2      ENDOCRINE/INTEGUMENTARY:  Glycemic Control: SSI PRN, Prevent hypoglycemia      ICU DAILY CHECKLIST   Indwelling devices:  Tubes: None  Lines: Peripheral IV  Drains: None  DVT Prophylaxis: SCD's or Sequential Compression Device   SUP: Pepcid (famotidine)   PT/OT: PT consulted and on board, OT consulted and on board and Speech therapy consulted and on board   Consultants: Neuro, NeuroIR  Code Status: Full  Goals of Care Discussion with family: Yes   Plan of Care/Family Update: Yes   Discussed Care Plan with Bedside RN: Yes     HOSPITAL COURSE/DAILY EVENT LOG       Overnight Events   4/27/22 -- Global expressive & receptive aphasia    Past Medical History:      has a past medical history of Ankle fracture (3/2000), Arthritis, Cancer ([facility] [address] Hypercholesterolemia, Hypertension, Personal history of kidney stones (1968), Prostatitis (2001), Psychiatric disorder, and Unspecified adverse effect of anesthesia. Past Surgical History:      has a past surgical history that includes hx hernia repair (2004); hx orthopaedic (Right, 2000); hx orthopaedic (Right, 2017); hx orthopaedic (Left, 1980s); and hx colonoscopy.     Home Medications:     Prior to Admission medications    Medication Sig Start Date End Date Taking? Authorizing Provider   aspirin delayed-release (ASPIR-81) 81 mg tablet Take 1 Tab by mouth every twelve (12) hours. Take either Enteric Coated of Delayed Release Aspirin. May obtain over-the-counter. Indications: Prevention of Blood Clots after Total Knee Replacement 2/15/18   Ying Guerrero NP   acetaminophen (TYLENOL) 500 mg tablet Take 1 Tab by mouth every four (4) hours (while awake). Schedule for pain control over the next 7-14 days. Do not exceed 3000 mg in 24 hours. Obtain over-the-counter. Indications: Postoperative Incisional Knee Pain 2/15/18   Ying Guerrero NP   diclofenac EC (VOLTAREN) 50 mg EC tablet Take 1 Tab by mouth two (2) times daily as needed. Indications: OSTEOARTHRITIS, Postoperative Incisional Knee Pain 2/15/18   Ying Guerrero NP   oxyCODONE IR (ROXICODONE) 5 mg immediate release tablet Take 0.5-1 Tabs by mouth every four (4) hours as needed (Severe pain not relieved by tramadol (Ultram). Take with food & fluids. ). Max Daily Amount: 30 mg. Indications: Severe Incisional Knee Pain 2/15/18   Yign Guerrero NP   traMADol Shi [place]) 50 mg tablet Take 0.5-1 Tabs by mouth every six (6) hours as needed. Max Daily Amount: 200 mg. Indications: Postoperative Incisional Knee Pain 2/15/18   Ying Guerrero NP   potassium chloride (K-DUR, KLOR-CON) 20 mEq tablet Take 40 mEq by mouth daily. Provider, Historical   atorvastatin (LIPITOR) 10 mg tablet Take 10 mg by mouth daily. Provider, Historical   amlodipine (NORVASC) 10 mg tablet Take 10 mg by mouth daily. Provider, Historical   hydrochlorothiazide (HYDRODIURIL) 25 mg tablet Take 25 mg by mouth daily. Provider, Historical       Allergies/Social/Family History:      Allergies   Allergen Reactions    Ace Inhibitors Cough    Percocet [Oxycodone-Acetaminophen] Nausea and Vomiting     \"I DON'T TOLERATE THE STRONG OPIODS\"      Social History     Tobacco Use    Smoking status: Never Smoker  Smokeless tobacco: Never Used   Substance Use Topics    Alcohol use: Yes     Alcohol/week: 3.3 standard drinks     Types: 4 Glasses of wine per week      Family History   Problem Relation Age of Onset    Dementia Mother     Anesth Problems Mother         DIFFICULT TO AROUSE; DID NOT INVOLVE LENGTHY VENTILATOR USE.  Emphysema Father        Review of Systems:     A comprehensive review of systems was negative except for that written in the HPI. Objective:     General:  Alert, cooperative, well noursished, well developed, appears stated age   Eyes:  Sclera anicteric. Pupils equally round and reactive to light. Mouth/Throat: Mucous membranes normal, oral pharynx clear   Neck: Supple   Lungs:   Clear to auscultation bilaterally, good effort   CV:  Regular rate and rhythm,no murmur, click, rub or gallop   Abdomen:   Soft, non-tender. bowel sounds normal. non-distended   Extremities: Right arm flaccid, Moves all other extremities to comman   Skin: Skin color, texture, turgor normal. no acute rash or lesions   Lymph nodes: Cervical and supraclavicular normal   Musculoskeletal: No swelling or deformity   Lines/Devices:  Intact, no erythema, drainage or tenderness   Neuro: Alert, aphasic, right arm flaccid, moves all other extremities.        Vital Signs:  Visit Vitals  BP (!) 153/82   Pulse 91   Temp 100.4 °F (38 °C)   Resp 20   Ht 5' 6\" (1.676 m)   Wt 74 kg (163 lb 2.3 oz)   SpO2 95%   BMI 26.33 kg/m²    O2 Flow Rate (L/min): 5 l/min O2 Device: Nasal cannula Temp (24hrs), Av.2 °F (37.3 °C), Min:98.7 °F (37.1 °C), Max:100.4 °F (38 °C)           Intake/Output:     Intake/Output Summary (Last 24 hours) at 2022 0617  Last data filed at 2022 0400  Gross per 24 hour   Intake 608.75 ml   Output --   Net 608.75 ml       LABS AND  DATA: Personally reviewed 22    MEDS: Personally Reviewed 22    IMAGING: New Imaging Reviewed 22    Chest X-Ray:   CXR Results  (Last 48 hours)    None CT Scan:       ECHO:  Pending    Multidisciplinary Rounds Completed: Yes    ABCDEF Bundle/Checklist Completed:  Yes    SPECIAL EQUIPMENT  None    DISPOSITION  Stay in ICU    CRITICAL CARE DOCUMENTATION  I had a face to face encounter with the patient, reviewed and interpreted patient data including clinical events, labs, images, vital signs, I/O's, and examined patient. I have discussed the case and the plan and management of the patient's care with the consulting services, the bedside nurses and the respiratory therapist.      NOTE OF PERSONAL INVOLVEMENT IN CARE   This patient has a high probability of imminent, clinically significant deterioration, which requires the highest level of preparedness to intervene urgently. I participated in the decision-making and personally managed or directed the management of the following life and organ supporting interventions that required my frequent assessment to treat or prevent imminent deterioration. I personally spent 55 minutes of critical care time. This is time spent at this critically ill patient's bedside actively involved in patient care as well as the coordination of care. This does not include any procedural time which has been billed separately.     DO [facility]  Critical Care Medicine

## 2022-04-27 NOTE — PROGRESS NOTES
ICU progress note-brief update  Met and spoke with daughter Susana aguirre at the bedside. Her mother Ham Beal, was also at the bedside. Susana Simms is the POA for her father. We spoke about goals of care, his current lifestyle, and his wishes if something would happen to him. She expressed to me that she has discussed this with her sister who lives in [place], as well as her  who is an ER physician. They are in agreement to make Mr. Serene Jain a DNR, as well as a DNI. We did discuss current path going forward, and plan to do a vascular surgery consult for the severe stenosis of the left ICA. I explained to them that this will not change current symptoms, and there is probably a risk to do this. However I will defer to the vascular surgery team on explanation of risks and benefits. We did talk about palliative care involvement as well as hospice involvement. There again to talk as a family in the next couple days to decide best approaches. In the interim we will continue stroke medical management care. Plan for MRI at 1730. Once results are pended for the MRI, we will update the daughter. I informed the bedside nurse of the above discussion.     Gerry Andre DO   Staff Intensivist/Anesthesiologist  Critical Care Medicine

## 2022-04-27 NOTE — PROGRESS NOTES
Problem: Self Care Deficits Care Plan (Adult)  Goal: *Acute Goals and Plan of Care (Insert Text)  Description: FUNCTIONAL STATUS PRIOR TO ADMISSION: Patient was independent and active without use of DME per chart review, patient unable to provide background 2/2 aphasia. HOME SUPPORT: The patient lived with wife but did not require assist.    Occupational Therapy Goals  Initiated 4/27/2022   1. Patient will perform 2 simple grooming tasks in supported sitting with moderate assistance within 7 day(s). 2.  Patient will perform anterior neck to thigh bathing in supported sitting with moderate assistance within 7 day(s). 3.  Patient will tolerate sitting EOB for 5 minutes in prep for ADLs and OOB mobility with mod A within 7 days. 4.  Patient will perform toilet transfers to/from [facility] with moderate assistance x2 within 7 day(s). 5.  Patient will participate in upper extremity therapeutic exercise/activities with moderate assistance within 7 day(s). 6.  Patient will participate in the [facility] in prep for ADLs within 7 days. Outcome: Not Met    OCCUPATIONAL THERAPY EVALUATION  Patient: Rubi Cabrales (1 y.o. male)  Date: 4/27/2022  Primary Diagnosis: CVA (cerebral vascular accident) [facility]) [I63.9]        Precautions:  Fall (-170)    ASSESSMENT  Based on the objective data described below, the patient presents with limited ADL performance s/p admission for CVA. Patient ADLs limited by poor command following, aphasia, R weakness, R inattention (able to turn to R and visually attend with cuing), R sided tone (flexion tone RUE, extensor tone RLE), decreased activity tolerance, and limited lower body access. CT showing L ICA stenosis - MRI not yet completed. At baseline, patient IND and living with wife. Patient s/p TPA administration (>12 hours post administration) and cleared by RN for evaluation. He was unable to provide PLOF and made no verbalizations during session.   Formal assessment limited by command following. Patient did move BLEs and LUE, no volitional movement of RUE noted. Required max A for rolling for toileting with total A. Noted patient did attempt to assist with rolling during toileting. Required max A for scooting in the bed and total A to transition to sitting upright in bed using bed mechanics. Patient left sitting upright in bed with call bell in reach, RN aware, al needs met. Of note, significant flexor tone (proximal<distal) in RUE - placed rolled wash cloth in R hand to reduce risk of skin break down. Patient unable to participate in formal Fugl-Messer assessment 2/2 poor command following and sequencing. Recommending IPR upon discharge pending progress in acute care as patient is well below IND baseline. Current Level of Function Impacting Discharge (ADLs/self-care): max-total A for ADLs    Functional Outcome Measure: The patient scored Total: 0/100 on the Barthel Index outcome measure which is indicative of 100% impairment in basic self-care. Other factors to consider for discharge: was IND PTA     Patient will benefit from skilled therapy intervention to address the above noted impairments. PLAN :  Recommendations and Planned Interventions: self care training, functional mobility training, therapeutic exercise, balance training, visual/perceptual training, therapeutic activities, cognitive retraining, endurance activities, neuromuscular re-education, patient education, home safety training, and family training/education    Frequency/Duration: Patient will be followed by occupational therapy 5 times a week to address goals.     Recommendation for discharge: (in order for the patient to meet his/her long term goals)  Therapy 3 hours per day 5-7 days per week    This discharge recommendation:  Has been made in collaboration with the attending provider and/or case management    IF patient discharges home will need the following DME: bedside commode, hospital bed, mechanical lift, and transfer bench       SUBJECTIVE:   Patient did not verbalize or nod yes/no    OBJECTIVE DATA SUMMARY:   HISTORY:   Past Medical History:   Diagnosis Date    Ankle fracture 3/2000    Right    Arthritis     Cancer ([address]     SCCA    Hypercholesterolemia     Hypertension     Personal history of kidney stones 1968    Prostatitis 2001    Psychiatric disorder     CLAUSTROPHOBIA; MASK OVER FACE WOULD CAUSE ANXIETY; HAS REACTED TO TIGHT SPACE (UNDER HOUSE)    Unspecified adverse effect of anesthesia     mother difficult to arouse post anesthesia     Past Surgical History:   Procedure Laterality Date    HX COLONOSCOPY      HX HERNIA REPAIR  9760    UMBILICAL    HX ORTHOPAEDIC Right 2000    ORIF ANKLE WITH PINS    HX ORTHOPAEDIC Right 2017    FOOT SURGERY    HX ORTHOPAEDIC Left 1980s    FOOT SURGERY       Expanded or extensive additional review of patient history:     Home Situation  Support Systems: Child(kai) (Daughter Parmjit Jimenez [phone])    Hand dominance: Right    EXAMINATION OF PERFORMANCE DEFICITS:  Cognitive/Behavioral Status:  Neurologic State: Alert;Drowsy; Confused  Orientation Level: Unable to verbalize (aphasia)  Cognition: Follows commands;Decreased attention/concentration  Perception: Cues to attend to right side of body;Cues to attend right visual field; Tactile;Verbal;Visual  Perseveration: No perseveration noted  Safety/Judgement: Decreased awareness of environment;Decreased awareness of need for assistance;Decreased awareness of need for safety;Decreased insight into deficits; Fall prevention    Skin: appears grossly intact    Edema: none noted in BUEs    Hearing:       Vision/Perceptual:    Tracking: Requires cues, head turns, or add eye shifts to track (to attend to R)      Acuity:  (unable to formally assess)    Corrective Lenses: Glasses    Range of Motion:  In BUEs  AROM: Grossly decreased, non-functional (R side)  PROM: Grossly decreased, non-functional (R side) Strength: In BUEs  Strength: Grossly decreased, non-functional (R side)    Coordination:  Coordination: Grossly decreased, non-functional (R side)  Fine Motor Skills-Upper: Left Intact; Right Impaired    Gross Motor Skills-Upper: Left Intact; Right Impaired    Tone & Sensation:  In BUEs  Tone: Abnormal (fluctuating tone in R side - flexor UE, extensor LE)  Sensation:  (unable to formally assess)     Balance:  Sitting: Impaired    Functional Mobility and Transfers for ADLs:  Bed Mobility:  Rolling: Maximum assistance;Assist x2  Supine to Sit: Total assistance;Assist x2  Scooting: Total assistance;Assist x2 (to scoot to [facility])    Transfers:   NT this session    ADL Assessment:  Feeding: Total assistance    Oral Facial Hygiene/Grooming: Total assistance    Bathing: Total assistance    Upper Body Dressing: Total assistance    Lower Body Dressing: Total assistance    Toileting: Total assistance    ADL Intervention and task modifications: Toileting  Toileting Assistance: Total assistance(dependent)  Bladder Hygiene: Total assistance (dependent)  Bowel Hygiene: Total assistance (dependent)  Clothing Management: Total assistance (dependent)  Cues: Physical assistance for pants down;Physical assistance for pants up; Tactile cues provided;Verbal cues provided    Cognitive Retraining  Safety/Judgement: Decreased awareness of environment;Decreased awareness of need for assistance;Decreased awareness of need for safety;Decreased insight into deficits; Fall prevention    Functional Measure:    Barthel Index:  Bathin  Bladder: 0  Bowels: 0  Groomin  Dressin  Feedin  Mobility: 0  Stairs: 0  Toilet Use: 0  Transfer (Bed to Chair and Back): 0  Total: 0/100      The Barthel ADL Index: Guidelines  1. The index should be used as a record of what a patient does, not as a record of what a patient could do.   2. The main aim is to establish degree of independence from any help, physical or verbal, however minor and for whatever reason. 3. The need for supervision renders the patient not independent. 4. A patient's performance should be established using the best available evidence. Asking the patient, friends/relatives and nurses are the usual sources, but direct observation and common sense are also important. However direct testing is not needed. 5. Usually the patient's performance over the preceding 24-48 hours is important, but occasionally longer periods will be relevant. 6. Middle categories imply that the patient supplies over 50 per cent of the effort. 7. Use of aids to be independent is allowed. Score Interpretation (from [address] [facility] [address])    Independent   60-79 Minimally independent   40-59 Partially dependent   20-39 Very dependent   <20 Totally dependent     -Libia Gibbons., Barthel, DAnibalW. (1965). Functional evaluation: the Barthel Index. [address] [facility] ([address] (1997). The Barthel activities of daily living index: self-reporting versus actual performance in the old (> or = 75 years). Journal [address] 45(7), [address] [facility], J.J.M.F, [facility] Luly.Danny. (1999). Measuring the change in disability after inpatient rehabilitation; comparison of the responsiveness of the Barthel Index and Functional Caroline Measure. Journal of Neurology, Neurosurgery, and Psychiatry, 66(4), 718-016. Tirso Scanlon, NENIO, MOIZ Porras, & Katherin Whitney MMIR. (2004) Assessment of post-stroke quality of life in cost-effectiveness studies: The usefulness of the Barthel Index and the EuroQoL-5D.  Quality of Life Research, 15, 326-67     Occupational Therapy Evaluation Charge Determination   History Examination Decision-Making   MEDIUM Complexity : Expanded review of history including physical, cognitive and psychosocial  history  HIGH Complexity : 5 or more performance deficits relating to physical, cognitive , or psychosocial skils that result in activity limitations and / or participation restrictions HIGH Complexity : Patient presents with comorbidities that affect occupational performance. Signifigant modification of tasks or assistance (eg, physical or verbal) with assessment (s) is necessary to enable patient to complete evaluation       Based on the above components, the patient evaluation is determined to be of the following complexity level: HIGH   Pain Rating:  Unable to verbalize, however, did not give any indication of pain during session    Activity Tolerance:   Fair    After treatment patient left in no apparent distress:    Supine in bed, Call bell within reach, Bed / chair alarm activated, Side rails x 3, and RN aware    COMMUNICATION/EDUCATION:   The patients plan of care was discussed with: Physical therapist, Registered nurse, and Case management. Home safety education was provided and the patient/caregiver indicated understanding. and Patient/family have participated as able in goal setting and plan of care. This patients plan of care is appropriate for delegation to [facility] [place].     Thank you for this referral.  Palak Andrews, OT  Time Calculation: 17 mins

## 2022-04-27 NOTE — ED NOTES
He started to vomit and become diaphoretic and not responsive. ICU intensivist was called.  Code stroke level I called per request of ICU intensivist.

## 2022-04-27 NOTE — CONSULTS
Neurology [address], [facility]  Neurocritical Care Physician Assistant  BEBE Cell: [phone]      Patient: Jourdan Garcia MRN: [id number]  SSN: [id number]    YOB: 1940  Age: 79 y.o. Sex: male      Chief Complaint: aphasia, RUE weakness    Subjective: Jourdan Garcia is a 79 y.o. male who presents with expressive aphasia, right facial droop, right side weakness, right pronator drift; patient had initial symptoms started at 1608, resolution noted by EMS at at 1622; en route to ER, symptoms recurred. LKW time re-calculated at 1622. 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 At [facility], patient was noted to have an NIHSS 14, and patient was deemed appropriate for tPA administration. 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 NIS was consulted and patient was not appropriate for thrombectomy. Patient was transferred to [address] [place] [address] for ongoing evaluation and treatment. While in the ER, patient was noted to have increased weakness of his RUE, and repeat imaging was done, with no new findings. On exam, patient is noted to have NIH score of 11 at first exam and 12 on repeat exam, due to the increased weakness of his right arm. Patient is unable to provide ROS due to aphasia.     Past Medical History:   Diagnosis Date    Ankle fracture 3/2000    Right    Arthritis     Cancer ([facility] [address]     SCCA    Hypercholesterolemia     Hypertension     Personal history of kidney stones 1968    Prostatitis 2001    Psychiatric disorder     CLAUSTROPHOBIA; MASK OVER FACE WOULD CAUSE ANXIETY; HAS REACTED TO TIGHT SPACE (UNDER HOUSE)    Unspecified adverse effect of anesthesia     mother difficult to arouse post anesthesia     Past Surgical History:   Procedure Laterality Date    HX COLONOSCOPY      HX HERNIA REPAIR  2004 UMBILICAL    HX ORTHOPAEDIC Right 2000    ORIF ANKLE WITH PINS    HX ORTHOPAEDIC Right 2017    FOOT SURGERY    HX ORTHOPAEDIC Left 1980s    FOOT SURGERY      Family History   Problem Relation Age of Onset    Dementia Mother     Anesth Problems Mother         DIFFICULT TO AROUSE; DID NOT INVOLVE LENGTHY VENTILATOR USE.  Emphysema Father      Social History     Tobacco Use    Smoking status: Never Smoker    Smokeless tobacco: Never Used   Substance Use Topics    Alcohol use:  Yes     Alcohol/week: 3.3 standard drinks     Types: 4 Glasses of wine per week      Current Facility-Administered Medications   Medication Dose Route Frequency Provider Last Rate Last Admin    0.9% sodium chloride infusion 50 mL  50 mL IntraVENous ONCE Sam Robbins, DO        0.9% sodium chloride infusion 50 mL  50 mL IntraVENous ONCE Sam Robbins DO        niCARdipine (CARDENE) 25 mg in 0.9% sodium chloride 250 mL infusion  5-15 mg/hr IntraVENous TITRATE Sam Robbins DO        acetaminophen (TYLENOL) tablet 650 mg  650 mg Oral Q4H PRN Jeff Garcia DO        Or    acetaminophen (TYLENOL) solution 650 mg  650 mg Per NG tube Q4H PRN Jeff Garcia DO        Or    acetaminophen (TYLENOL) suppository 650 mg  650 mg Rectal Q4H PRN Jeff Garcia DO        0.9% sodium chloride infusion  75 mL/hr IntraVENous CONTINUOUS Jeff Garcia DO 75 mL/hr at 04/26/22 1953 75 mL/hr at 04/26/22 1953    ondansetron (ZOFRAN) injection 4 mg  4 mg IntraVENous Q6H PRN Jeff Garcia DO        aspirin tablet 325 mg  325 mg Oral DAILY Jeff Garcia DO        atorvastatin (LIPITOR) tablet 80 mg  80 mg Oral QHS Jeff Garcia DO        docusate sodium (COLACE) capsule 100 mg  100 mg Oral BID Jeff Garcia DO        famotidine (PEPCID) tablet 20 mg  20 mg Oral DAILY Jeff Garcia DO          Allergies   Allergen Reactions    Ace Inhibitors Cough    Percocet [Oxycodone-Acetaminophen] Nausea and Vomiting     \"I DON'T TOLERATE THE STRONG OPIODS\"     Review of Systems:  Review of systems not obtained due to patient factors. Objective:     Vitals:    04/26/22 2330 04/27/22 0000 04/27/22 0030 04/27/22 0137   BP: (!) 156/79 (!) 163/76 (!) 155/65    Pulse: (!) 108 96 97    Resp: 27 20 22    Temp: 98.7 °F (37.1 °C) 98.7 °F (37.1 °C)     SpO2: 92% 93% 94% 94%   Weight:       Height:            Physical Exam:  GENERAL: Calm, cooperative, NAD  SKIN: Warm, dry, color appropriate for ethnicity.    CARD: RRR, no m/r/g, peripheral pulses strong and equal  PULM: CTA, no w/r/r  ABD: SNT, no masses palpable  Musc/Skel: AYALA, no obvious deformity    Neuro Exam:   Elderly male  , no obvious acute distress  Awake and alert, oriented to self, time, place and current events  Mood appropriate, short and long term memory appear intact  PERRL 4 mm, conjugate gaze, good tracking  Speech is limited to \"yes\" patient is frustrated, and unable to answer  Face with mild right droop, tongue is midline  Cranial nerve exam: unable to fully assess due to patient condition  (I) sense of smell: not assessed  (II) visual fields and acuity: grossly intact  (III, IV, VI) eye movements and pupils: intact  (III, sympathetic and parasympathetic): intact  (V) sensory function of face: unable to assess due to patient condition  (VII)(XI) strength of facial  and shoulder girdle muscles: unable to assess, patient unable to perform  (VII, VIII) hearing: grossly intact  (VII, IX, X) taste: not assess  (IX, X) pharyngeal movement and reflex: intact  (XII) tongue movements: intact/midline   Pronator drift: right  Motor function/Strength  RUE: flaccid at 2nd exam, antigravity at first exam  LUE: Delt: 4/5 Bic: 4/5 Tri: 4/5 Wfl: 4/5 Wext: 4/5 : 4/5  RLE: IP:3/5 Quad:3/5 Flex:3/5 Ext:3/5 DF:3/5 PF:3/5 EHL: 3/5  LLE: IIP:5/5 Quad:5/5 Flex:5/5 Ext:5/5 DF:55/5 PF:5/5 EHL: 5/5  Babinski: downward bilaterally  Clonus: negative  Micky: negative  Sensation intact to light touch; intact to sharp touch  Proprioception: intact  Cerebellar function: patient unable to perform    Labs:  Recent Results (from the past 24 hour(s))   GLUCOSE, POC    Collection Time: 04/26/22  5:12 PM   Result Value Ref Range    Glucose (POC) 97 65 - 117 mg/dL    Performed by Stacy Linares    EKG, 12 LEAD, INITIAL    Collection Time: 04/26/22  5:19 PM   Result Value Ref Range    Ventricular Rate 78 BPM    Atrial Rate 78 BPM    P-R Interval 164 ms    QRS Duration 72 ms    Q-T Interval 388 ms    QTC Calculation (Bezet) 442 ms    Calculated P Axis 15 degrees    Calculated R Axis 27 degrees    Calculated T Axis 12 degrees    Diagnosis       Sinus rhythm with premature atrial complexes with aberrant conduction  Possible Inferior infarct , age undetermined  Abnormal ECG  When compared with ECG of 29-JAN-2018 09:19,  aberrant conduction is now present  ST now depressed in Anterior leads  T wave inversion now evident in Inferior leads     CBC WITH AUTOMATED DIFF    Collection Time: 04/26/22  5:24 PM   Result Value Ref Range    WBC 5.5 4.1 - 11.1 K/uL    RBC 5.38 4.10 - 5.70 M/uL    HGB 16.7 12.1 - 17.0 g/dL    HCT 48.6 36.6 - 50.3 %    MCV 90.3 80.0 - 99.0 FL    MCH 31.0 26.0 - 34.0 PG    MCHC 34.4 30.0 - 36.5 g/dL    RDW 12.8 11.5 - 14.5 %    PLATELET 366 609 - 806 K/uL    MPV 10.4 8.9 - 12.9 FL    NRBC 0.0 0  WBC    ABSOLUTE NRBC 0.00 0.00 - 0.01 K/uL    NEUTROPHILS 60 32 - 75 %    LYMPHOCYTES 20 12 - 49 %    MONOCYTES 16 (H) 5 - 13 %    EOSINOPHILS 3 0 - 7 %    BASOPHILS 1 0 - 1 %    IMMATURE GRANULOCYTES 0 0.0 - 0.5 %    ABS. NEUTROPHILS 3.4 1.8 - 8.0 K/UL    ABS. LYMPHOCYTES 1.1 0.8 - 3.5 K/UL    ABS. MONOCYTES 0.9 0.0 - 1.0 K/UL    ABS. EOSINOPHILS 0.1 0.0 - 0.4 K/UL    ABS. BASOPHILS 0.0 0.0 - 0.1 K/UL    ABS. IMM.  GRANS. 0.0 0.00 - 0.04 K/UL    DF AUTOMATED     METABOLIC PANEL, COMPREHENSIVE    Collection Time: 04/26/22  5:24 PM   Result Value Ref Range    Sodium 140 136 - 145 mmol/L    Potassium 3.7 3.5 - 5.1 mmol/L    Chloride 103 97 - 108 mmol/L    CO2 26 21 - 32 mmol/L    Anion gap 11 5 - 15 mmol/L    Glucose 101 (H) 65 - 100 mg/dL    BUN 20 6 - 20 MG/DL    Creatinine 1.36 (H) 0.70 - 1.30 MG/DL    BUN/Creatinine ratio 15 12 - 20      GFR est AA >60 >60 ml/min/1.73m2    GFR est non-AA 50 (L) >60 ml/min/1.73m2    Calcium 10.2 (H) 8.5 - 10.1 MG/DL    Bilirubin, total 1.1 (H) 0.2 - 1.0 MG/DL    ALT (SGPT) 35 12 - 78 U/L    AST (SGOT) 21 15 - 37 U/L    Alk. phosphatase 104 45 - 117 U/L    Protein, total 7.6 6.4 - 8.2 g/dL    Albumin 4.4 3.5 - 5.0 g/dL    Globulin 3.2 2.0 - 4.0 g/dL    A-G Ratio 1.4 1.1 - 2.2     PROTHROMBIN TIME + INR    Collection Time: 04/26/22  5:24 PM   Result Value Ref Range    INR 1.1 0.9 - 1.1      Prothrombin time 10.5 9.0 - 11.1 sec   COVID-19 RAPID TEST    Collection Time: 04/26/22  6:14 PM   Result Value Ref Range    Specimen source Nasopharyngeal      COVID-19 rapid test Not detected NOTD          Imaging:  CT CODE NEURO HEAD WO CONTRAST    Result Date: 4/26/2022  Findings concerning for acute ischemia left frontal region. CT CODE NEURO HEAD WO CONTRAST    Result Date: 4/26/2022  1. No acute intracranial abnormality      CT CODE NEURO PERF W CBF    Result Date: 4/26/2022  Diminished left MCA territory blood flow and perfusion. Small calculated perfusion mismatch.      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 Deane Severance, the on-call radiologist, at 9227     Final report to follow. Assessment:     Hospital Problems  Never Reviewed          Codes Class Noted POA    CVA (cerebral vascular accident) [facility]) ICD-10-CM: I63.9  ICD-9-CM: 434.91  4/26/2022 Unknown            Plan:     ICU for tPA protocol  q 1 hour neuro checks  Repeat CT or MRI at 24 hours (before starting anticoagulant/antiplatelet medications)  Permissive hypertension: SBP goal 150-170  IVF: 125 cc/hr  Hold ASA/DAPT until post tPA imaging  ECHO  Vascular surgery consult for left ICA occlusion  PT/OT/SLP  Stroke education    Further recommendations pending full evaluation by attending. Thank you for this consult and allowing our participation in the care of this patient.       Signed By: Marisol Vines PA-C    April 27, 2022

## 2022-04-27 NOTE — PROGRESS NOTES
Problem: Mobility Impaired (Adult and Pediatric)  Goal: *Acute Goals and Plan of Care (Insert Text)  Description: FUNCTIONAL STATUS PRIOR TO ADMISSION: Pt aphasic, did not verbalize, unable to provide PLOF. Physical Therapy Goals  Initiated 4/27/2022  1. Patient will move from supine to sit and sit to supine , scoot up and down, and roll side to side in bed with maximal assistance within 7 day(s). 2.  Patient will transfer from bed to chair and chair to bed with maximal assistance using the least restrictive device within 7 day(s). 3.  Patient will perform sit to stand with maximal assistance within 7 day(s). 4.  Patient will tolerate static sitting EOB x2 minutes with moderate assistance within 7 day(s). Outcome: Not Met  PHYSICAL THERAPY EVALUATION  Patient: Patric Presley (89 y.o. male)  Date: 4/27/2022  Primary Diagnosis: CVA (cerebral vascular accident) [facility]) [I63.9]        Precautions: Fall (-170)    ASSESSMENT  Based on the objective data described below, the patient presents with poor command following, aphasia, R weakness, R inattention (able to turn to R and visually attend with cuing), R sided tone (flexion tone RUE, extensor tone RLE), decreased activity tolerance, and overall decline in functional mobility s/p admission with L ICA stenosis - MRI not yet completed. Pt s/p TPA administration (>12 hours post administration) and cleared by RN to mobilize. He was unable to provide PLOF and made no verbalizations during session. Formal assessment limited by command following. Pt did move BLEs and LUE, no volitional movement of RUE noted. Required maxA for rolling and scooting in the bed and totalA to transition to sitting upright in bed using bed mechanics. Recommending IPR upon discharge pending progress in acute care. Current Level of Function Impacting Discharge (mobility/balance): max-totalA for all mobility     Functional Outcome Measure:   The patient scored 0/56 on the Johnson outcome measure which is indicative of high fall risk. Other factors to consider for discharge: poor command following, unclear baseline, R sided tone and weakness      Patient will benefit from skilled therapy intervention to address the above noted impairments. PLAN :  Recommendations and Planned Interventions: bed mobility training, transfer training, gait training, therapeutic exercises, neuromuscular re-education, patient and family training/education, and therapeutic activities      Frequency/Duration: Patient will be followed by physical therapy:  5 times a week to address goals.     Recommendation for discharge: (in order for the patient to meet his/her long term goals)  Therapy 3 hours per day 5-7 days per week    This discharge recommendation:  Has not yet been discussed the attending provider and/or case management    IF patient discharges home will need the following DME: to be determined (TBD)         SUBJECTIVE:   Patient did not verbalize     OBJECTIVE DATA SUMMARY:   HISTORY:    Past Medical History:   Diagnosis Date    Ankle fracture 3/2000    Right    Arthritis     Cancer ([facility] [address]     SCCA    Hypercholesterolemia     Hypertension     Personal history of kidney stones 1968    Prostatitis 2001    Psychiatric disorder     CLAUSTROPHOBIA; MASK OVER FACE WOULD CAUSE ANXIETY; HAS REACTED TO TIGHT SPACE (UNDER HOUSE)    Unspecified adverse effect of anesthesia     mother difficult to arouse post anesthesia     Past Surgical History:   Procedure Laterality Date    HX COLONOSCOPY      HX HERNIA REPAIR  3606    UMBILICAL    HX ORTHOPAEDIC Right 2000    ORIF ANKLE WITH PINS    HX ORTHOPAEDIC Right 2017    FOOT SURGERY    HX ORTHOPAEDIC Left 1980s    FOOT SURGERY     EXAMINATION/PRESENTATION/DECISION MAKING:   Critical Behavior:  Neurologic State: Confused,Agitated  Orientation Level: Unable to verbalize  Cognition: No command following    Range Of Motion:  AROM: Grossly decreased, non-functional (R side)           PROM: Grossly decreased, non-functional (R side)           Strength:    Strength: Grossly decreased, non-functional (R side)                    Tone & Sensation:   Tone: Abnormal (increased flexor tone RUE and extensor tone RLE)              Sensation:  (unable to assess d/t cognition)               Coordination:  Coordination: Grossly decreased, non-functional    Functional Mobility:  Bed Mobility:  Rolling: Maximum assistance;Assist x2  Supine to Sit: Total assistance ( bed mechanics utilized)    Functional Measure:  Johnson Balance Test:    Sitting to Standin  Standing Unsupported: 0  Sitting with Back Unsupported: 0  Standing to Sittin  Transfers: 0  Standing Unsupported with Eyes Closed: 0  Standing Unsupported with Feet Together: 0  Reach Forward with Outstretched Arm: 0   Object: 0  Turn to Look Over Shoulders: 0  Turn 360 Degrees: 0  Alternate Foot on Step/Stool: 0  Standing Unsupported One Foot in Front: 0  Stand on One Le  Total: 0/56         56=Maximum possible score;   0-20=High fall risk  21-40=Moderate fall risk   41-56=Low fall risk      Physical Therapy Evaluation Charge Determination   History Examination Presentation Decision-Making   MEDIUM  Complexity : 1-2 comorbidities / personal factors will impact the outcome/ POC  HIGH Complexity : 4+ Standardized tests and measures addressing body structure, function, activity limitation and / or participation in recreation  LOW Complexity : Stable, uncomplicated  Other outcome measures Johnson  HIGH       Based on the above components, the patient evaluation is determined to be of the following complexity level: LOW     Activity Tolerance:   Poor    After treatment patient left in no apparent distress:   Supine in bed, Call bell within reach, Bed / chair alarm activated, and Side rails x 3    COMMUNICATION/EDUCATION:   The patients plan of care was discussed with: Occupational therapist and Registered nurse.      Patient is unable to participate in goal setting and plan of care.     Thank you for this referral.  Conner Abdi, PT, DPT   Time Calculation: 15 mins

## 2022-04-28 ENCOUNTER — HOSPICE ADMISSION (OUTPATIENT)
Dept: HOSPICE | Facility: HOSPICE | Age: 82
End: 2022-04-28

## 2022-04-28 ENCOUNTER — APPOINTMENT (OUTPATIENT)
Dept: VASCULAR SURGERY | Age: 82
DRG: 037 | End: 2022-04-28
Attending: SURGERY
Payer: MEDICARE

## 2022-04-28 LAB
ANION GAP SERPL CALC-SCNC: 8 MMOL/L (ref 5–15)
APTT PPP: 25.2 SEC (ref 22.1–31)
BUN SERPL-MCNC: 14 MG/DL (ref 6–20)
BUN/CREAT SERPL: 14 (ref 12–20)
CALCIUM SERPL-MCNC: 9.2 MG/DL (ref 8.5–10.1)
CHLORIDE SERPL-SCNC: 106 MMOL/L (ref 97–108)
CO2 SERPL-SCNC: 27 MMOL/L (ref 21–32)
CREAT SERPL-MCNC: 1.02 MG/DL (ref 0.7–1.3)
ERYTHROCYTE [DISTWIDTH] IN BLOOD BY AUTOMATED COUNT: 12.9 % (ref 11.5–14.5)
GLUCOSE SERPL-MCNC: 105 MG/DL (ref 65–100)
HCT VFR BLD AUTO: 42.4 % (ref 36.6–50.3)
HGB BLD-MCNC: 14.2 G/DL (ref 12.1–17)
LEFT CCA DIST DIAS: 16.8 CM/S
LEFT CCA DIST SYS: 61.1 CM/S
LEFT CCA PROX DIAS: 15.5 CM/S
LEFT CCA PROX SYS: 56 CM/S
LEFT ECA DIAS: 11.6 CM/S
LEFT ECA SYS: 96.3 CM/S
LEFT ICA DIST DIAS: 24.9 CM/S
LEFT ICA DIST SYS: 76.5 CM/S
LEFT ICA MID DIAS: 29.9 CM/S
LEFT ICA MID SYS: 133.3 CM/S
LEFT ICA PROX DIAS: 78.4 CM/S
LEFT ICA PROX SYS: 368.7 CM/S
LEFT ICA/CCA SYS: 6 NO UNITS
LEFT VERTEBRAL DIAS: 20 CM/S
LEFT VERTEBRAL SYS: 67.9 CM/S
MCH RBC QN AUTO: 31.5 PG (ref 26–34)
MCHC RBC AUTO-ENTMCNC: 33.5 G/DL (ref 30–36.5)
MCV RBC AUTO: 94 FL (ref 80–99)
NRBC # BLD: 0 K/UL (ref 0–0.01)
NRBC BLD-RTO: 0 PER 100 WBC
PLATELET # BLD AUTO: 145 K/UL (ref 150–400)
PMV BLD AUTO: 10.5 FL (ref 8.9–12.9)
POTASSIUM SERPL-SCNC: 3 MMOL/L (ref 3.5–5.1)
RBC # BLD AUTO: 4.51 M/UL (ref 4.1–5.7)
RIGHT CCA DIST DIAS: 17.6 CM/S
RIGHT CCA DIST SYS: 82.7 CM/S
RIGHT CCA PROX DIAS: 12.7 CM/S
RIGHT CCA PROX SYS: 94.9 CM/S
RIGHT ECA DIAS: 13.8 CM/S
RIGHT ECA SYS: 149.9 CM/S
RIGHT ICA DIST DIAS: 14.1 CM/S
RIGHT ICA DIST SYS: 47.5 CM/S
RIGHT ICA MID DIAS: 10.8 CM/S
RIGHT ICA MID SYS: 45.3 CM/S
RIGHT ICA PROX DIAS: 15.7 CM/S
RIGHT ICA PROX SYS: 69.6 CM/S
RIGHT ICA/CCA SYS: 0.8 NO UNITS
RIGHT VERTEBRAL DIAS: 14.6 CM/S
RIGHT VERTEBRAL SYS: 63 CM/S
SODIUM SERPL-SCNC: 141 MMOL/L (ref 136–145)
THERAPEUTIC RANGE,PTTT: NORMAL SECS (ref 58–77)
WBC # BLD AUTO: 9.3 K/UL (ref 4.1–11.1)

## 2022-04-28 PROCEDURE — 77010033678 HC OXYGEN DAILY

## 2022-04-28 PROCEDURE — 65270000029 HC RM PRIVATE

## 2022-04-28 PROCEDURE — 74011250637 HC RX REV CODE- 250/637: Performed by: ANESTHESIOLOGY

## 2022-04-28 PROCEDURE — 93880 EXTRACRANIAL BILAT STUDY: CPT

## 2022-04-28 PROCEDURE — 85730 THROMBOPLASTIN TIME PARTIAL: CPT

## 2022-04-28 PROCEDURE — 74011250636 HC RX REV CODE- 250/636: Performed by: PHYSICAL MEDICINE & REHABILITATION

## 2022-04-28 PROCEDURE — 74011250636 HC RX REV CODE- 250/636: Performed by: PSYCHIATRY & NEUROLOGY

## 2022-04-28 PROCEDURE — 99233 SBSQ HOSP IP/OBS HIGH 50: CPT | Performed by: PSYCHIATRY & NEUROLOGY

## 2022-04-28 PROCEDURE — 74011250637 HC RX REV CODE- 250/637: Performed by: NURSE PRACTITIONER

## 2022-04-28 PROCEDURE — 36415 COLL VENOUS BLD VENIPUNCTURE: CPT

## 2022-04-28 PROCEDURE — 74011250636 HC RX REV CODE- 250/636: Performed by: ANESTHESIOLOGY

## 2022-04-28 PROCEDURE — 80048 BASIC METABOLIC PNL TOTAL CA: CPT

## 2022-04-28 PROCEDURE — 99223 1ST HOSP IP/OBS HIGH 75: CPT | Performed by: PHYSICAL MEDICINE & REHABILITATION

## 2022-04-28 PROCEDURE — 85027 COMPLETE CBC AUTOMATED: CPT

## 2022-04-28 RX ORDER — LORAZEPAM 2 MG/ML
1 INJECTION INTRAMUSCULAR
Status: DISCONTINUED | OUTPATIENT
Start: 2022-04-28 | End: 2022-05-02

## 2022-04-28 RX ORDER — POTASSIUM CHLORIDE 14.9 MG/ML
10 INJECTION INTRAVENOUS
Status: COMPLETED | OUTPATIENT
Start: 2022-04-28 | End: 2022-04-28

## 2022-04-28 RX ORDER — HYDROMORPHONE HYDROCHLORIDE 1 MG/ML
0.5 INJECTION, SOLUTION INTRAMUSCULAR; INTRAVENOUS; SUBCUTANEOUS
Status: DISCONTINUED | OUTPATIENT
Start: 2022-04-28 | End: 2022-05-02

## 2022-04-28 RX ORDER — HEPARIN SODIUM 10000 [USP'U]/100ML
12-25 INJECTION, SOLUTION INTRAVENOUS
Status: DISCONTINUED | OUTPATIENT
Start: 2022-04-28 | End: 2022-04-28

## 2022-04-28 RX ORDER — ASPIRIN 300 MG/1
300 SUPPOSITORY RECTAL EVERY EVENING
Status: DISCONTINUED | OUTPATIENT
Start: 2022-04-28 | End: 2022-04-28

## 2022-04-28 RX ORDER — GLYCOPYRROLATE 0.2 MG/ML
0.2 INJECTION INTRAMUSCULAR; INTRAVENOUS
Status: DISCONTINUED | OUTPATIENT
Start: 2022-04-28 | End: 2022-05-07 | Stop reason: HOSPADM

## 2022-04-28 RX ORDER — MIDODRINE HYDROCHLORIDE 5 MG/1
5 TABLET ORAL EVERY 8 HOURS
Status: DISCONTINUED | OUTPATIENT
Start: 2022-04-28 | End: 2022-04-28

## 2022-04-28 RX ORDER — SCOLOPAMINE TRANSDERMAL SYSTEM 1 MG/1
1 PATCH, EXTENDED RELEASE TRANSDERMAL
Status: DISCONTINUED | OUTPATIENT
Start: 2022-04-28 | End: 2022-05-07 | Stop reason: HOSPADM

## 2022-04-28 RX ADMIN — HYDROMORPHONE HYDROCHLORIDE 0.5 MG: 1 INJECTION, SOLUTION INTRAMUSCULAR; INTRAVENOUS; SUBCUTANEOUS at 19:16

## 2022-04-28 RX ADMIN — POTASSIUM CHLORIDE 10 MEQ: 14.9 INJECTION, SOLUTION INTRAVENOUS at 08:52

## 2022-04-28 RX ADMIN — POTASSIUM CHLORIDE 10 MEQ: 14.9 INJECTION, SOLUTION INTRAVENOUS at 11:18

## 2022-04-28 RX ADMIN — MIDODRINE HYDROCHLORIDE 5 MG: 5 TABLET ORAL at 02:01

## 2022-04-28 RX ADMIN — POTASSIUM CHLORIDE 10 MEQ: 14.9 INJECTION, SOLUTION INTRAVENOUS at 11:04

## 2022-04-28 RX ADMIN — HEPARIN SODIUM 12 UNITS/KG/HR: 1000 INJECTION INTRAVENOUS; SUBCUTANEOUS at 11:20

## 2022-04-28 RX ADMIN — POTASSIUM CHLORIDE 10 MEQ: 14.9 INJECTION, SOLUTION INTRAVENOUS at 08:50

## 2022-04-28 RX ADMIN — POTASSIUM CHLORIDE 10 MEQ: 14.9 INJECTION, SOLUTION INTRAVENOUS at 12:25

## 2022-04-28 RX ADMIN — LORAZEPAM 1 MG: 2 INJECTION INTRAMUSCULAR; INTRAVENOUS at 19:16

## 2022-04-28 NOTE — PROGRESS NOTES
VIMAL PLAN:  RUR-11%  Disposition-IPR per therapy  Transportation-by BLS  F/U with PCP/Specialist    Hospice referral sent via [place] to [facility] called and spoke with Patient's daughter Misael Hernandez, she is looking into an IPR facility close to [place] where she lives. She will hopefully make a decision about which IPR facility she prefers today. CM will continue to follow for VIMAL. Rocael Daniel RN, CRM    3:10 pm. CM noted consult for hospice care.  Minerva Moran MSA, RN,CM

## 2022-04-28 NOTE — PROGRESS NOTES
Physical Therapy: Hold    Chart reviewed in prep for PT treatment. Per RN, pt drowsy this morning and unable to arouse to receive morning meds. Pt unable to actively participate with therapy at this time, will hold and continue to follow. Addendum: Note goals of care are now for comfort and PT orders have been discontinued. Will sign off.       Naif Castro, PT, DPT

## 2022-04-28 NOTE — PROGRESS NOTES
History and Physical    Name: Anisha Segundo   : 1940   MRN: [id number]   Date: 2022      Reason for ICU Admission: Acute ischemic stroke status post tPA    HISTORY OF PRESENT ILLNESS   This is an 56-year-old male who called EMS this evening due to signs and symptoms of a stroke. Per chart review patient was feeling flushed and having expressive aphasia. EMS was on scene around 1622 patient was alert and oriented, carrying conversation and amatory. When EMS arrived to the [facility] pump ED patient appears to have expressive aphasia, right-sided facial droop and right-sided weakness. In the [facility] emergency room alerted the stroke telemetry physician, who was concern for an ischemic stroke. IV tPA was initiated. ICU was consulted for post tPA care. Triple scans are pending, the neuro interventional surgical team, Dr. Stone Horne, is aware of the patient and patient may go to the [facility] suite for LVO removal if present. Past medical history on review of chart only is positive for osteoarthritis of the right knee. On review of medications the patient takes aspirin, Tylenol, Voltaren, Roxicodone, tramadol, potassium, Lipitor, Norvasc, and hydrochlorothiazide. Based on the previously mentioned medications, the patient probably also has history of hypertension, as well as hyperlipidemia. On review of laboratory results, patient with increased creatinine, and a slight TORIBIO. IMPRESSION/PROBLEM LIST   Acute ischemic stroke  Status post IV tPA given for stroke  TORIBIO    ASSESSMENT & PLAN     NEUROLOGICAL/PAIN/SEDATION:  Analgesia: Acetaminophen  Sedation: None   tPA given ~ 1745  Possible LVO, intervention pending  Coagulation parameters--PT, PTT, INR   Endovascular intervention (time to groin puncture, recanalization, TICI score) pending  Supplemental Oxygen/Saturation Goal > 94%  Glucose goal: 140 - 180 mg/dL  NS  Monitor for A.  Fib  Monitor for and prevent Fever  HOB > 30 degrees  Avoid insertion of catheters/tubes  No anticoagulant/antiplatelet therapy for 24 hours  Repeat CT or MRI at 24 hours (before starting anticoagulant/antiplatelet medications)  Per NIS expect some fluctuation in his neuro exams. This is not completely unexpected. 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 to see  HEMATOLOGY/ONCOLOGY:  Transfusion Trigger (Hgb): <7 g/dL    GASTROINTESTINAL:  Diet/Feeding:  Pending      RENAL/:  Keep K>4; Mg>2      ENDOCRINE/INTEGUMENTARY:  Glycemic Control: SSI PRN, Prevent hypoglycemia      ICU DAILY CHECKLIST   Indwelling devices:  Tubes: None  Lines: Peripheral IV  Drains: None  DVT Prophylaxis: SCD's or Sequential Compression Device   SUP: Pepcid (famotidine)   PT/OT: PT consulted and on board, OT consulted and on board and Speech therapy consulted and on board   Consultants: Neuro, NeuroIR  Code Status: Full  Goals of Care Discussion with family: Yes   Plan of Care/Family Update: Yes   Discussed Care Plan with Bedside RN: Yes     HOSPITAL COURSE/DAILY EVENT LOG       Overnight Events   4/27/22 -- Global expressive & receptive aphasia  4/28/22 -- Global aphasia, MRI done    Past Medical History:      has a past medical history of Ankle fracture (3/2000), Arthritis, Cancer ([facility] [address] Hypercholesterolemia, Hypertension, Personal history of kidney stones (1968), Prostatitis (2001), Psychiatric disorder, and Unspecified adverse effect of anesthesia. Past Surgical History:      has a past surgical history that includes hx hernia repair (2004); hx orthopaedic (Right, 2000); hx orthopaedic (Right, 2017); hx orthopaedic (Left, 1980s); and hx colonoscopy.     Home Medications:     Prior to Admission medications    Medication Sig Start Date End Date Taking? Authorizing Provider   aspirin delayed-release (ASPIR-81) 81 mg tablet Take 1 Tab by mouth every twelve (12) hours. Take either Enteric Coated of Delayed Release Aspirin. May obtain over-the-counter. Indications: Prevention of Blood Clots after Total Knee Replacement 2/15/18   Venecia Reyes NP   acetaminophen (TYLENOL) 500 mg tablet Take 1 Tab by mouth every four (4) hours (while awake). Schedule for pain control over the next 7-14 days. Do not exceed 3000 mg in 24 hours. Obtain over-the-counter. Indications: Postoperative Incisional Knee Pain 2/15/18   Venecia Reyes NP   oxyCODONE IR (ROXICODONE) 5 mg immediate release tablet Take 0.5-1 Tabs by mouth every four (4) hours as needed (Severe pain not relieved by tramadol (Ultram). Take with food & fluids. ). Max Daily Amount: 30 mg. Indications: Severe Incisional Knee Pain 2/15/18   Venecia Reyes NP   traMADol Aleatha Maser) 50 mg tablet Take 0.5-1 Tabs by mouth every six (6) hours as needed. Max Daily Amount: 200 mg. Indications: Postoperative Incisional Knee Pain 2/15/18   Venecia Reyes, NP   potassium chloride (K-DUR, KLOR-CON) 20 mEq tablet Take 40 mEq by mouth daily. Provider, Historical   atorvastatin (LIPITOR) 10 mg tablet Take 10 mg by mouth daily. Provider, Historical   amlodipine (NORVASC) 10 mg tablet Take 10 mg by mouth daily. Provider, Historical   hydrochlorothiazide (HYDRODIURIL) 25 mg tablet Take 25 mg by mouth daily. Provider, Historical       Allergies/Social/Family History: Allergies   Allergen Reactions    Ace Inhibitors Cough    Percocet [Oxycodone-Acetaminophen] Nausea and Vomiting     \"I DON'T TOLERATE THE STRONG OPIODS\"      Social History     Tobacco Use    Smoking status: Never Smoker    Smokeless tobacco: Never Used   Substance Use Topics    Alcohol use:  Yes     Alcohol/week: 3.3 standard drinks     Types: 4 Glasses of wine per week      Family History   Problem Relation Age of Onset    Dementia Mother     Anesth Problems Mother         DIFFICULT TO AROUSE; DID NOT INVOLVE LENGTHY VENTILATOR USE.  Emphysema Father        Review of Systems:     A comprehensive review of systems was negative except for that written in the HPI. Objective:     General:  Alert, cooperative, well noursished, well developed, appears stated age   Eyes:  Sclera anicteric. Pupils equally round and reactive to light. Mouth/Throat: Mucous membranes normal, oral pharynx clear   Neck: Supple   Lungs:   Clear to auscultation bilaterally, good effort   CV:  Regular rate and rhythm,no murmur, click, rub or gallop   Abdomen:   Soft, non-tender. bowel sounds normal. non-distended   Extremities: Right arm flaccid, Moves all other extremities to comman   Skin: Skin color, texture, turgor normal. no acute rash or lesions   Lymph nodes: Cervical and supraclavicular normal   Musculoskeletal: No swelling or deformity   Lines/Devices:  Intact, no erythema, drainage or tenderness   Neuro: Alert, aphasic, right arm flaccid, moves all other extremities. Vital Signs:  Visit Vitals  BP (!) 153/85   Pulse 71   Temp 98.9 °F (37.2 °C)   Resp 23   Ht 5' 6\" (1.676 m)   Wt 74 kg (163 lb 2.3 oz)   SpO2 92%   BMI 26.33 kg/m²    O2 Flow Rate (L/min): 4 l/min O2 Device: Nasal cannula Temp (24hrs), Av °F (37.8 °C), Min:98.5 °F (36.9 °C), Max:101.1 °F (38.4 °C)           Intake/Output:     Intake/Output Summary (Last 24 hours) at 2022 2812  Last data filed at 2022 0600  Gross per 24 hour   Intake 1105 ml   Output 1250 ml   Net -145 ml       LABS AND  DATA: Personally reviewed 22    MEDS: Personally Reviewed 22    IMAGING: New Imaging Reviewed 22    Chest X-Ray:   CXR Results  (Last 48 hours)    None          CT Scan:       ECHO:  Pending    Multidisciplinary Rounds Completed:   Yes    ABCDEF Bundle/Checklist Completed:  Yes    SPECIAL EQUIPMENT  None    DISPOSITION  Transfer to non-ICU bed    CRITICAL CARE DOCUMENTATION  I had a face to face encounter with the patient, reviewed and interpreted patient data including clinical events, labs, images, vital signs, I/O's, and examined patient.   I have discussed the case and the plan and management of the patient's care with the consulting services, the bedside nurses and the respiratory therapist.      Jaren Ho. [facility]  Critical Care Medicine

## 2022-04-28 NOTE — PROGRESS NOTES
ICU progress note-brief update    Patient is now status post tPA approximately 36 hours. Has done MRI, as well as CAT scan. Patient is a DNR and DNI. Change neurochecks to every 2 hours, and possibly every 4 per neurology. Awaiting input from vascular surgery, regarding starting heparin therapy. If heparin therapy is not utilized, will start dual antiplatelet therapy. PerfectServe to hospitalist triage, Billie Birmingham. She responded that she is not at work today, and will forward to Dr. Fabrizio Cintron. Transferred to [facility].     Kale Alejandra, DO   Staff Intensivist/Anesthesiologist  Critical Care Medicine

## 2022-04-28 NOTE — PROGRESS NOTES
ICU progress note-brief update    Spoke to Dr. Audra Chopra, attending neurologist.  Plan on starting heparin infusion. Plan for rectal aspirin. No Plavix. Will discontinue transfer order to [facility]. Keep in ICU for 24 hours. Plan for CT head, post 24 hours of heparin or sooner if neurological changes ensue.     Jesús Sage, DO   Staff Intensivist/Anesthesiologist  Critical Care Medicine

## 2022-04-28 NOTE — PROGRESS NOTES
ICU Progress Note - Brief Update    Spoke to daughter Gunnar Basurto) and Mrs. Pagan (Spouse). Updated on plan of care. Heparin therapy started. Discussed need for nutrition and also oral medications (I.e. warfarin, ASA, etc.)  Family and I discussed getting Palliative Medicine involved. I updated Diana López RN. Consult placed for Palliative Med.     Derik Victoria,    Staff Intensivist/Anesthesiologist  Critical Care Medicine

## 2022-04-28 NOTE — PROGRESS NOTES
Occupational Therapy:     Chart reviewed in prep for OT treatment. Per RN, pt drowsy this morning and unable to arouse to receive morning meds. Pt unable to actively participate with therapy at this time, will hold and continue to follow.     Thank you,   Matt Cartagena, OTMICHELL, OTR/L

## 2022-04-28 NOTE — PROGRESS NOTES
1930: Bedside shift change report given to Sophia Garrett  (oncoming nurse) by Elif Acevedo (offgoing nurse). Report included the following information SBAR, Kardex, Intake/Output and MAR.     0730: Bedside shift change report given to Elif Acevedo  (oncoming nurse) by Sophia Garrett  (offgoing nurse). Report included the following information SBAR, Kardex, Intake/Output and MAR.

## 2022-04-28 NOTE — CONSULTS
Palliative Medicine Consult  [facility]: [phone] ([phone])    Patient Name: Naomi Harrell  YOB: 1940    Date of Initial Consult: 4/28/22  Reason for Consult: Care decisions   Requesting Provider: Jose   Primary Care Physician: Scott Velazquez MD     SUMMARY:   Naomi Harrell is a 79 y.o. with a past history of HTN, hypercholesterolemia, osteoarthritis who was admitted on 4/26/2022 from home via EMS after experiencing R sided weakness and aphasia. Given TPA due to concern for stroke. Found to have thrombus causing severe stenosis of L ICA. Neuro IS consulted. Recommended eval for carotid endarterectomy, no endovascular intervention. MRI brain showing numerous acute/subacute nonhemorrhagic ischemia throughout L MCA territory. Able to work w/ therapy 4/27 (poor command following) and placed on puree diet w/ thin liquids but more lethargic today, not very arousable so changed from Plavix and ASA to heparin ggt. Current medical issues leading to Palliative Medicine involvement include: care decisions. Social: Pt  x 51 years to Janes Hernandez (who was hospice volunteer in past). They have 2 daughters- Marylou Aguilera (in [place], is a OB/Gyn NP, her  is an ED physician) and Sarai Romero ([facility]). Pt worked w/ [id number], many degrees incl in [address] [place] [address] and [place]. He would often travel to [place] to help Marylou Aguilera in her garden. 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 PALLIATIVE DIAGNOSES:   1. L MCA infarcts, severe L ICA stenosis  2. Shortness of breath on 5L NC  3. Aphasia  4. Somnolence  5. Feeding difficulties   6. Goals of care        PLAN:   1. Meet w/ family- dtr Marylou Aguilera and wife Janes Hernandez. They appreciate conversations held thus far w/ intensivist. After I introduce myself, start exploring what they have further discussed.  Initially pt was able to work some w/ therapies (though very limited) and plan was inpatient rehab, however since then pt has become more lethargic and family has talked more. 2. Pt previously quite healthy and very active (see \"social hx\" above)- family all very clear that he would not want to live in current state and concerned about risks of considering CEA for L ICA stenosis. Pt may be able to make some gains, but they feel that given his current state of debility he would be at risk for ongoing complications such as aspiration PNA, etc that would set him back. If quality of life is not where it was before, they feel that pt would not want to linger so so speak - and would want comfort measures. 3. Family would like comfort measures starting today- they do not even like the neuro checks where we need to see if pt grimaces to pain. Okay w/ stopping heparin ggt and labs, realizing that pt may be at risk for further ischemia. Want him comfortable and dignified. Do not want an NGT and certainly not a PEG tube. 4. Would like to explore inpatient hospice- do tell family about the regulations that limit IP admission to hospice if comfortable, but that he can certainly change at any time. 5. Comfort measures. 6. Stop heparin ggt and labs. 7. Comfort medications prn.   8. If awake/alert on puree diet and thins. 9. Tx to medical floor, hospice consulted. 10. Initial consult note routed to primary continuity provider and/or primary health care team members  11.  Communicated plan of care with: Palliative IDT, [facility] Team incl Dr Lavern Hodge, Dr Gage Maldonado RN w/ hospice and [facility] bedside RN, Neale Schlatter care management      GOALS OF CARE / TREATMENT PREFERENCES:     GOALS OF CARE:  Patient/Health Care Proxy Stated Goals: Comfort    TREATMENT PREFERENCES:   Code Status: DNR    Patient and family's personal goals include: to die w/ comfort       Advance Care Planning:  [x] [facility] Interdisciplinary Team has updated the ACP Navigator with [address] and Patient Capacity      Primary Decision Maker: Austin Casarez - Daughter - [phone]  Advance Care Planning 2/13/2018   Patient's Healthcare Decision Maker is: Legal Next of Kin   Primary Decision Maker Name -   Primary Decision Maker Phone Number -   Confirm Advance Directive Yes, not on file       Medical Interventions: Comfort measures       Other:    As far as possible, the palliative care team has discussed with patient / health care proxy about goals of care / treatment preferences for patient. HISTORY:     History obtained from: Chart, staff, family     CHIEF COMPLAINT: Cannot obtain due to patient factors      HPI/SUBJECTIVE:    The patient is:   [] Verbal and participatory  [x] Non-participatory due to: medical situation     Pt unresponsive to voice. Clinical Pain Assessment (nonverbal scale for severity on nonverbal patients):   Clinical Pain Assessment  Severity: 0     Activity (Movement): Lying quietly, normal position    Duration: for how long has pt been experiencing pain (e.g., 2 days, 1 month, years)  Frequency: how often pain is an issue (e.g., several times per day, once every few days, constant)     FUNCTIONAL ASSESSMENT:     Palliative Performance Scale (PPS):  PPS: 20       PSYCHOSOCIAL/SPIRITUAL SCREENING:     Palliative IDT has assessed this patient for cultural preferences / practices and a referral made as appropriate to needs (Cultural Services, Patient Advocacy, Ethics, etc.)    Any spiritual / Rastafari concerns:  [] Yes /  [x] No   If \"Yes\" to discuss with pastoral care during IDT     Does caregiver feel burdened by caring for their loved one:   [] Yes /  [x] No /  [] No Caregiver Present    If \"Yes\" to discuss with social work during IDT    Anticipatory grief assessment:   [x] Normal  / [] Maladaptive     If \"Maladaptive\" to discuss with social work during IDT    ESAS Anxiety:      ESAS Depression:       Cannot obtain due to patient factors     REVIEW OF SYSTEMS:     Positive and pertinent negative findings in ROS are noted above in HPI.   The following systems were [x] reviewed / [] unable to be reviewed as noted in HPI  Other findings are noted below. Systems: constitutional, ears/nose/mouth/throat, respiratory, gastrointestinal, genitourinary, musculoskeletal, integumentary, neurologic, psychiatric, endocrine. Positive findings noted below. Modified ESAS Completed by: provider   Fatigue: 10 Drowsiness: 10     Pain: 0           Dyspnea: 0                    PHYSICAL EXAM:     From RN flowsheet:  Wt Readings from Last 3 Encounters:   04/27/22 163 lb 2.3 oz (74 kg)   01/01/22 165 lb (74.8 kg)   02/13/18 162 lb (73.5 kg)     Blood pressure (!) 157/80, pulse (!) 58, temperature 99.2 °F (37.3 °C), resp. rate 19, height 5' 6\" (1.676 m), weight 163 lb 2.3 oz (74 kg), SpO2 95 %.     Pain Scale 1: Adult Nonverbal Pain Scale  Pain Intensity 1: 0                 Last bowel movement, if known:     Constitutional: calm, not responding to voice, well nourished   Eyes: closed   Respiratory: breathing not labored  Musculoskeletal: no deformity, no tenderness to palpation  Skin: warm, dry  Neurologic: not following commands      HISTORY:     Principal Problem:    CVA (cerebral vascular accident) ([facility] [address] (4/26/2022)      Past Medical History:   Diagnosis Date    Ankle fracture 3/2000    Right    Arthritis     Cancer ([facility] [address]     SCCA    Hypercholesterolemia     Hypertension     Personal history of kidney stones 1968    Prostatitis 2001    Psychiatric disorder     CLAUSTROPHOBIA; MASK OVER FACE WOULD CAUSE ANXIETY; HAS REACTED TO TIGHT SPACE (UNDER HOUSE)    Unspecified adverse effect of anesthesia     mother difficult to arouse post anesthesia      Past Surgical History:   Procedure Laterality Date    HX COLONOSCOPY      HX HERNIA REPAIR  3747    UMBILICAL    HX ORTHOPAEDIC Right 2000    ORIF ANKLE WITH PINS    HX ORTHOPAEDIC Right 2017    FOOT SURGERY    HX ORTHOPAEDIC Left 1980s    FOOT SURGERY      Family History   Problem Relation Age of Onset    Dementia Mother     Anesth Problems Mother         DIFFICULT TO AROUSE; DID NOT INVOLVE LENGTHY VENTILATOR USE.  Emphysema Father       History reviewed, no pertinent family history. Social History     Tobacco Use    Smoking status: Never Smoker    Smokeless tobacco: Never Used   Substance Use Topics    Alcohol use: Yes     Alcohol/week: 3.3 standard drinks     Types: 4 Glasses of wine per week     Allergies   Allergen Reactions    Ace Inhibitors Cough    Percocet [Oxycodone-Acetaminophen] Nausea and Vomiting     \"I DON'T TOLERATE THE STRONG OPIODS\"      Current Facility-Administered Medications   Medication Dose Route Frequency    LORazepam (ATIVAN) injection 1 mg  1 mg IntraVENous Q15MIN PRN    glycopyrrolate (ROBINUL) injection 0.2 mg  0.2 mg IntraVENous Q4H PRN    scopolamine (TRANSDERM-SCOP) 1 mg over 3 days 1 Patch  1 Patch TransDERmal Q72H PRN    HYDROmorphone (DILAUDID) injection 0.5 mg  0.5 mg IntraVENous Q15MIN PRN    acetaminophen (TYLENOL) tablet 650 mg  650 mg Oral Q4H PRN    Or    acetaminophen (TYLENOL) solution 650 mg  650 mg Per NG tube Q4H PRN    Or    acetaminophen (TYLENOL) suppository 650 mg  650 mg Rectal Q4H PRN    ondansetron (ZOFRAN) injection 4 mg  4 mg IntraVENous Q6H PRN          LAB AND IMAGING FINDINGS:     Lab Results   Component Value Date/Time    WBC 9.3 04/28/2022 06:13 AM    HGB 14.2 04/28/2022 06:13 AM    PLATELET 847 (L) 68/75/6420 06:13 AM     Lab Results   Component Value Date/Time    Sodium 141 04/28/2022 06:20 AM    Potassium 3.0 (L) 04/28/2022 06:20 AM    Chloride 106 04/28/2022 06:20 AM    CO2 27 04/28/2022 06:20 AM    BUN 14 04/28/2022 06:20 AM    Creatinine 1.02 04/28/2022 06:20 AM    Calcium 9.2 04/28/2022 06:20 AM    Magnesium 2.1 04/27/2022 04:28 AM    Phosphorus 2.9 04/27/2022 04:28 AM      Lab Results   Component Value Date/Time    Alk.  phosphatase 104 04/26/2022 05:24 PM    Protein, total 7.6 04/26/2022 05:24 PM    Albumin 4.4 04/26/2022 05:24 PM Globulin 3.2 04/26/2022 05:24 PM     Lab Results   Component Value Date/Time    INR 1.1 04/26/2022 05:24 PM    Prothrombin time 10.5 04/26/2022 05:24 PM    aPTT 25.2 04/28/2022 10:01 AM      No results found for: IRON, FE, TIBC, IBCT, PSAT, FERR   No results found for: PH, PCO2, PO2  No components found for: [place]   Lab Results   Component Value Date/Time    CK 74 08/07/2009 11:21 AM                Total time:   Counseling / coordination time, spent as noted above:   > 50% counseling / coordination?:     Prolonged service was provided for  []30 min   []75 min in face to face time in the presence of the patient, spent as noted above. Time Start:   Time End:   Note: this can only be billed with 99152 (initial) or 83632 (follow up). If multiple start / stop times, list each separately.

## 2022-04-28 NOTE — PROGRESS NOTES
Speech Therapy    Patient lethargic this date, not rousing to voice or touch. Per RN, PO intake held for this reason. Will hold off on SLP treatment for safety. Yasmeen Driver M.S., CCC-SLP

## 2022-04-28 NOTE — PROGRESS NOTES
Problem: Aspiration - Risk of  Goal: *Absence of aspiration  Outcome: Progressing Towards Goal     Problem: Patient Education: Go to Patient Education Activity  Goal: Patient/Family Education  Outcome: Progressing Towards Goal     Problem: Pressure Injury - Risk of  Goal: *Prevention of pressure injury  Description: Document Den Scale and appropriate interventions in the flowsheet. Outcome: Progressing Towards Goal  Note: Pressure Injury Interventions:  Sensory Interventions: Assess changes in LOC    Moisture Interventions: Absorbent underpads    Activity Interventions: Assess need for specialty bed,Pressure redistribution bed/mattress(bed type)    Mobility Interventions: Assess need for specialty bed    Nutrition Interventions: Document food/fluid/supplement intake                     Problem: Patient Education: Go to Patient Education Activity  Goal: Patient/Family Education  Outcome: Progressing Towards Goal     Problem: Falls - Risk of  Goal: *Absence of Falls  Description: Document Liza Cory Fall Risk and appropriate interventions in the flowsheet.   Outcome: Progressing Towards Goal  Note: Fall Risk Interventions:       Mentation Interventions: Door open when patient unattended    Medication Interventions: Evaluate medications/consider consulting pharmacy    Elimination Interventions: Patient to call for help with toileting needs              Problem: Patient Education: Go to Patient Education Activity  Goal: Patient/Family Education  Outcome: Progressing Towards Goal     Problem: Patient Education: Go to Patient Education Activity  Goal: Patient/Family Education  Outcome: Progressing Towards Goal     Problem: Patient Education: Go to Patient Education Activity  Goal: Patient/Family Education  Outcome: Progressing Towards Goal     Problem: Patient Education: Go to Patient Education Activity  Goal: Patient/Family Education  Outcome: Progressing Towards Goal     Problem: Patient Education: Go to Patient Education Activity  Goal: Patient/Family Education  Outcome: Progressing Towards Goal     Problem: Patient Education: Go to Patient Education Activity  Goal: Patient/Family Education  Outcome: Progressing Towards Goal     Problem: Patient Education: Go to Patient Education Activity  Goal: Patient/Family Education  Outcome: Progressing Towards Goal

## 2022-04-28 NOTE — PROGRESS NOTES
Patient transitioned to [address] [place] [address] just prior to my visit. Thank you very much for allowing me to participate in the care of this patient. If there are any questions or concerns please do not hesitate to call or re-consult. Office number: [phone]    Vascular surgery will sign-off at this time.

## 2022-04-28 NOTE — PROGRESS NOTES
[facility]  Hospitalist Group     ICU Transfer/Accept Summary     This patient is being [place] [address] [place]  DATE OF TRANSFER: 4/28/2022       PATIENT ID: Abdullahi Nava  MRN: [id number]   YOB: 1940    PRIMARY CARE PROVIDER: Jayda Menezes MD   DATE OF ADMISSION: 4/26/2022  5:09 PM    ATTENDING PHYSICIAN: Radha Stratton MD  CONSULTATIONS:   IP CONSULT TO NEUROLOGY  IP CONSULT TO NEUROINTERVENTIONAL SURGERY  IP CONSULT TO PALLIATIVE CARE - PROVIDER    PROCEDURES/SURGERIES:   * No surgery found *    REASON FOR ADMISSION: CVA (cerebral vascular accident) [facility] PROBLEM LIST:  Patient Active Problem List   Diagnosis Code    Primary localized osteoarthritis of right knee M17.11    CVA (cerebral vascular accident) ([facility] [address] I63.9         Brief HPI and Hospital Course: Abdullahi Nava is a 79 y.o. with a past history of HTN, hypercholesterolemia, osteoarthritis who was admitted on 4/26/2022 from home via EMS after experiencing R sided weakness and aphasia. Given TPA due to concern for stroke. And then admitted to ICU after tpa. Found to have thrombus causing severe stenosis of L ICA. Neuro IS consulted. Recommended vascular surgen eval for carotid endarterectomy, no endovascular intervention. MRI brain showing numerous acute/subacute nonhemorrhagic ischemia throughout L MCA territory. Able to work w/ therapy 4/27 (poor command following) and placed on puree diet w/ thin liquids but more lethargic today, not very arousable so changed from Plavix and ASA to heparin ggt. Palliative consulted, and family members decide  transition to comfort care    Assessment and Plan:    1. Acute. L MCA infarcts : s/p tpa 4/26  Brain MRI: Numerous foci of acute or subacute nonhemorrhagic ischemia throughout the  left middle cerebral artery territory and left basal ganglia regions  2. severe > 95 stenosis of L ICA at origin by thrombus  3. AMS/Acute encephalopathy due to cva  4. Aphasia/dysphagia   5. Hypokalemia     Comfort care  Hospice consult may consider inpt hospice   No feeding tube. No labs. PHYSICAL EXAMINATION:  Visit Vitals  BP (!) 157/80   Pulse (!) 58   Temp 99.3 °F (37.4 °C)   Resp 19   Ht 5' 6\" (1.676 m)   Wt 74 kg (163 lb 2.3 oz)   SpO2 95%   BMI 26.33 kg/m²       General:         minimal responses to pain. HEENT:           no discharge. Neck:              no LN palpable   Lungs:             Clear to auscultation bilaterally. Heart:              Regular  rhythm,  No  murmur   No edema  Abdomen:       Soft, non-tender. Not distended. Bowel sounds normal  Extremities:     No cyanosis. Skin:                Not pale. Not Jaundiced  No rashes   Psych:             minimal responses . Neurologic:     minimal responses     Labs:     Recent Labs     04/28/22  0613 04/27/22 0428   WBC 9.3 11.3*   HGB 14.2 15.4   HCT 42.4 45.6   * 165     Recent Labs     04/28/22  0620 04/27/22  0428 04/26/22  1724    138 140   K 3.0* 3.1* 3.7    105 103   CO2 27 25 26   BUN 14 16 20   CREA 1.02 1.26 1.36*   * 146* 101*   CA 9.2 10.1 10.2*   MG  --  2.1  --    PHOS  --  2.9  --      Recent Labs     04/26/22  1724   ALT 35      TBILI 1.1*   TP 7.6   ALB 4.4   GLOB 3.2     Recent Labs     04/28/22  1001 04/26/22  1724   INR  --  1.1   PTP  --  10.5   APTT 25.2  --       No results for input(s): FE, TIBC, PSAT, FERR in the last 72 hours. No results found for: FOL, RBCF   No results for input(s): PH, PCO2, PO2 in the last 72 hours. No results for input(s): CPK, CKNDX, TROIQ in the last 72 hours.     No lab exists for component: CPKMB  Lab Results   Component Value Date/Time    Cholesterol, total 187 04/27/2022 04:28 AM    HDL Cholesterol 66 04/27/2022 04:28 AM    LDL, calculated 107.2 (H) 04/27/2022 04:28 AM    Triglyceride 69 04/27/2022 04:28 AM    CHOL/HDL Ratio 2.8 04/27/2022 04:28 AM     Lab Results   Component Value Date/Time    Glucose (POC) 97 04/26/2022 05:12 PM    Glucose (POC) 94 02/13/2018 07:20 AM     Lab Results   Component Value Date/Time    Color YELLOW/STRAW 01/29/2018 08:49 AM    Appearance CLEAR 01/29/2018 08:49 AM    Specific gravity 1.019 01/29/2018 08:49 AM    pH (UA) 6.0 01/29/2018 08:49 AM    Protein NEGATIVE  01/29/2018 08:49 AM    Glucose NEGATIVE  01/29/2018 08:49 AM    Ketone NEGATIVE  01/29/2018 08:49 AM    Bilirubin NEGATIVE  01/29/2018 08:49 AM    Urobilinogen 0.2 01/29/2018 08:49 AM    Nitrites NEGATIVE  01/29/2018 08:49 AM    Leukocyte Esterase NEGATIVE  01/29/2018 08:49 AM    Epithelial cells FEW 01/29/2018 08:49 AM    Bacteria NEGATIVE  01/29/2018 08:49 AM    WBC 0-4 01/29/2018 08:49 AM    RBC 0-5 01/29/2018 08:49 AM         CODE STATUS:   Full Code   x DNR    Partial   x Comfort Care       Signed:   Alysha Kohli MD  Date of Service:  4/28/2022  4:53 PM

## 2022-04-28 NOTE — PROGRESS NOTES
Neurocritical Care Progress Note  Taylor Galvan, [facility]  Neurocritical Care Physician Assistant  [phone]    Admit Date: 4/26/2022    LOS: 2    Daily Progress Note: 04/28/22     Overnight Events:  No acute events overnight, MRI completed, showing multiple foci of ischemia in left MCA territory; no hemorrhagic conversion noted. Vascular surgery has been consulted, and is recommending a heparin drip and carotid doppler; ECHO complete; patient has been made DNR/DNI by his family, and further decisions will be made when Vascular has made their recommendations.  mg has been started/given, plavix ordered for tomorrow, if heparin drip is not started. Fever to 101.1 noted; BP parameters are liberalized. Objective:     Visit Vitals  /67   Pulse 71   Temp 99.9 °F (37.7 °C)   Resp 19   Ht 5' 6\" (1.676 m)   Wt 74 kg (163 lb 2.3 oz)   SpO2 95%   BMI 26.33 kg/m²     Physical Exam:  Elderly male, no distress, cooperative with exam  Lungs: CTA bilaterally no wheezes, rales, ronchi  Heart: RRR, no murmurs, rubs, gallops  Skin warm and dry, cap refill: <2 seconds  No bleeding or hematoma noted     Neuro Exam:   Sleeping, woken to voice and touch, aphasic, no verbalization  Following commands  PERRL 4 mm right facial droop  Motor exam, strength:   RUE: flicker withdrawal LUE: antigravity, purposeful  RLE: flicker withdrawal LLE: antigravity  Pronator drift: not assessed  Micky's: negative  Clonus: negative  Babinski: upward bilaterally     Labs:  No results found for this or any previous visit (from the past 12 hour(s)). Imaging:  MRI BRAIN WO CONT    Result Date: 4/27/2022  1. Numerous foci of acute or subacute nonhemorrhagic ischemia throughout the left middle cerebral artery territory and left basal ganglia regions, without associated mass effect. . 2. Stable age-related change. CT HEAD WO CONT    Result Date: 4/27/2022  1. No new acute intracranial hemorrhage, mass effect or extra-axial collection.  2. Stable post ischemic change previously described in the left cerebral hemisphere. 3. Stable age-related change.       Assessment:   Principal Problem:    CVA (cerebral vascular accident) [facility])  Active Problems:      Past Medical History:   Diagnosis Date    Ankle fracture 3/2000    Right    Arthritis     Cancer (HCC)     SCCA    Hypercholesterolemia     Hypertension     Personal history of kidney stones 1968    Prostatitis 2001    Psychiatric disorder     CLAUSTROPHOBIA; MASK OVER FACE WOULD CAUSE ANXIETY; HAS REACTED TO TIGHT SPACE (UNDER HOUSE)    Unspecified adverse effect of anesthesia     mother difficult to arouse post anesthesia      Plan:   Continue medical management per Intensivist Team  Vascular Surgery consult for left ICA occlusion  Carotid doppler: per Vascular recs  DAPT to be started if heparin drip is not started by Vascular  PT/OT/SLP eval/treat  Liberalize SBP goals and neuro checks    Further recommendations pending full evaluation by Dr. Estrella Basurto by:  Kevin Pimentel PA-C  Neurocritical Care Physician Assistant  04/28/22   4:34 AM

## 2022-04-29 PROCEDURE — 65270000029 HC RM PRIVATE

## 2022-04-29 PROCEDURE — 99232 SBSQ HOSP IP/OBS MODERATE 35: CPT | Performed by: PHYSICAL MEDICINE & REHABILITATION

## 2022-04-29 PROCEDURE — 74011250636 HC RX REV CODE- 250/636: Performed by: PHYSICAL MEDICINE & REHABILITATION

## 2022-04-29 RX ADMIN — HYDROMORPHONE HYDROCHLORIDE 0.5 MG: 1 INJECTION, SOLUTION INTRAMUSCULAR; INTRAVENOUS; SUBCUTANEOUS at 02:53

## 2022-04-29 RX ADMIN — LORAZEPAM 1 MG: 2 INJECTION INTRAMUSCULAR; INTRAVENOUS at 11:53

## 2022-04-29 RX ADMIN — LORAZEPAM 1 MG: 2 INJECTION INTRAMUSCULAR; INTRAVENOUS at 17:47

## 2022-04-29 NOTE — PROGRESS NOTES
Palliative Medicine Consult  Zeeshan: [phone])    Patient Name: Abdullahi Nava  YOB: 1940    Date of Initial Consult: 4/28/22  Reason for Consult: Care decisions   Requesting Provider: Jose   Primary Care Physician: Jayda Menezes MD     SUMMARY:   Abdullahi Nava is a 79 y.o. with a past history of HTN, hypercholesterolemia, osteoarthritis who was admitted on 4/26/2022 from home via EMS after experiencing R sided weakness and aphasia. Given TPA due to concern for stroke. Found to have thrombus causing severe stenosis of L ICA. Neuro IS consulted. Recommended eval for carotid endarterectomy, no endovascular intervention. MRI brain showing numerous acute/subacute nonhemorrhagic ischemia throughout L MCA territory. Able to work w/ therapy 4/27 (poor command following) and placed on puree diet w/ thin liquids but more lethargic today, not very arousable so changed from Plavix and ASA to heparin ggt. Comfort measures only 4/28. Current medical issues leading to Palliative Medicine involvement include: care decisions. Social: Pt  x 51 years to Keith Villalta (who was hospice volunteer in past). They have 2 daughters- Radhika Mitchell (in [place], is a OB/Gyn NP, her  is an ED physician) and Reynaldo Euceda ([facility]). Pt worked w/ [id number], many degrees incl in [address] [place] [address]. He would often travel to [place] to help Radhika Mitchell in her garden. 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 PALLIATIVE DIAGNOSES:   1. L MCA infarcts, severe L ICA stenosis  2. Shortness of breath on 5L NC  3. Aphasia  4. Somnolence  5. Feeding difficulties   6. Goals of care        PLAN:   1. Pt with comfort measures only , did require one dose of IV Dilaudid overnight but currently appears comfortable. Evaluate pt w/ Jaqcueline hospice RN- and the hospice team will cont to eval if meets inpatient hospice criteria. Jacqueline to reach out to family. 2. Cont prn comfort meds, following w/ you. 3. Communicated plan of care with: Palliative IDT, [address] Team      GOALS OF CARE / TREATMENT PREFERENCES:     GOALS OF CARE:  Patient/Health Care Proxy Stated Goals: Comfort    TREATMENT PREFERENCES:   Code Status: DNR    Patient and family's personal goals include: to die w/ comfort       Advance Care Planning:  [x] [facility] Interdisciplinary Team has updated the ACP Navigator with Health Care Decision Maker and Patient Capacity      Primary Decision Maker: Román Lover Aisha Marin - [phone]  Advance Care Planning 2/13/2018   Patient's Healthcare Decision Maker is: Legal Next of Kin   Primary Decision Maker Name -   Primary Decision Maker Phone Number -   Confirm Advance Directive Yes, not on file       Medical Interventions: Comfort measures       Other:    As far as possible, the palliative care team has discussed with patient / health care proxy about goals of care / treatment preferences for patient. HISTORY:     History obtained from: Chart, staff, family     CHIEF COMPLAINT: Cannot obtain due to patient factors      HPI/SUBJECTIVE:    The patient is:   [] Verbal and participatory  [x] Non-participatory due to: medical situation     Pt unresponsive to voice.      Clinical Pain Assessment (nonverbal scale for severity on nonverbal patients):   Clinical Pain Assessment  Severity: 0     Activity (Movement): Lying quietly, normal position    Duration: for how long has pt been experiencing pain (e.g., 2 days, 1 month, years)  Frequency: how often pain is an issue (e.g., several times per day, once every few days, constant)     FUNCTIONAL ASSESSMENT:     Palliative Performance Scale (PPS):  PPS: 20       PSYCHOSOCIAL/SPIRITUAL SCREENING:     Palliative IDT has assessed this patient for cultural preferences / practices and a referral made as appropriate to needs (Cultural Services, Patient Advocacy, Ethics, etc.)    Any spiritual / Taoism concerns:  [] Yes /  [x] No   If \"Yes\" to discuss with pastoral care during IDT     Does caregiver feel burdened by caring for their loved one:   [] Yes /  [x] No /  [] No Caregiver Present    If \"Yes\" to discuss with social work during IDT    Anticipatory grief assessment:   [x] Normal  / [] Maladaptive     If \"Maladaptive\" to discuss with social work during IDT    ESAS Anxiety:      ESAS Depression:       Cannot obtain due to patient factors     REVIEW OF SYSTEMS:     Positive and pertinent negative findings in ROS are noted above in HPI. The following systems were [x] reviewed / [] unable to be reviewed as noted in HPI  Other findings are noted below. Systems: constitutional, ears/nose/mouth/throat, respiratory, gastrointestinal, genitourinary, musculoskeletal, integumentary, neurologic, psychiatric, endocrine. Positive findings noted below. Modified ESAS Completed by: provider   Fatigue: 10 Drowsiness: 10     Pain: 0           Dyspnea: 0                    PHYSICAL EXAM:     From RN flowsheet:  Wt Readings from Last 3 Encounters:   04/29/22 156 lb 8.4 oz (71 kg)   01/01/22 165 lb (74.8 kg)   02/13/18 162 lb (73.5 kg)     Blood pressure (!) 163/77, pulse 65, temperature 99.6 °F (37.6 °C), resp. rate 18, height 5' 6\" (1.676 m), weight 156 lb 8.4 oz (71 kg), SpO2 90 %.     Pain Scale 1: Adult Nonverbal Pain Scale  Pain Intensity 1: 0                 Last bowel movement, if known:     Constitutional: calm, not responding to voice, well nourished   Eyes: closed   Respiratory: breathing not labored  Musculoskeletal: no deformity, no tenderness to palpation  Skin: warm, dry  Neurologic: not following commands      HISTORY:     Principal Problem:    CVA (cerebral vascular accident) ([address] (4/26/2022)      Past Medical History:   Diagnosis Date    Ankle fracture 3/2000    Right    Arthritis     Cancer ([address]     SCCA    Hypercholesterolemia     Hypertension     Personal history of kidney stones 1968    Prostatitis 2001    Psychiatric disorder CLAUSTROPHOBIA; MASK OVER FACE WOULD CAUSE ANXIETY; HAS REACTED TO TIGHT SPACE (UNDER HOUSE)    Unspecified adverse effect of anesthesia     mother difficult to arouse post anesthesia      Past Surgical History:   Procedure Laterality Date    HX COLONOSCOPY      HX HERNIA REPAIR  5579    UMBILICAL    HX ORTHOPAEDIC Right 2000    ORIF ANKLE WITH PINS    HX ORTHOPAEDIC Right 2017    FOOT SURGERY    HX ORTHOPAEDIC Left 1980s    FOOT SURGERY      Family History   Problem Relation Age of Onset    Dementia Mother     Anesth Problems Mother         DIFFICULT TO AROUSE; DID NOT INVOLVE LENGTHY VENTILATOR USE.  Emphysema Father       History reviewed, no pertinent family history. Social History     Tobacco Use    Smoking status: Never Smoker    Smokeless tobacco: Never Used   Substance Use Topics    Alcohol use:  Yes     Alcohol/week: 3.3 standard drinks     Types: 4 Glasses of wine per week     Allergies   Allergen Reactions    Ace Inhibitors Cough    Percocet [Oxycodone-Acetaminophen] Nausea and Vomiting     \"I DON'T TOLERATE THE STRONG OPIODS\"      Current Facility-Administered Medications   Medication Dose Route Frequency    LORazepam (ATIVAN) injection 1 mg  1 mg IntraVENous Q15MIN PRN    glycopyrrolate (ROBINUL) injection 0.2 mg  0.2 mg IntraVENous Q4H PRN    scopolamine (TRANSDERM-SCOP) 1 mg over 3 days 1 Patch  1 Patch TransDERmal Q72H PRN    HYDROmorphone (DILAUDID) injection 0.5 mg  0.5 mg IntraVENous Q15MIN PRN    acetaminophen (TYLENOL) tablet 650 mg  650 mg Oral Q4H PRN    Or    acetaminophen (TYLENOL) solution 650 mg  650 mg Per NG tube Q4H PRN    Or    acetaminophen (TYLENOL) suppository 650 mg  650 mg Rectal Q4H PRN    ondansetron (ZOFRAN) injection 4 mg  4 mg IntraVENous Q6H PRN          LAB AND IMAGING FINDINGS:     Lab Results   Component Value Date/Time    WBC 9.3 04/28/2022 06:13 AM    HGB 14.2 04/28/2022 06:13 AM    PLATELET 665 (L) 45/03/8294 06:13 AM     Lab Results   Component Value Date/Time    Sodium 141 04/28/2022 06:20 AM    Potassium 3.0 (L) 04/28/2022 06:20 AM    Chloride 106 04/28/2022 06:20 AM    CO2 27 04/28/2022 06:20 AM    BUN 14 04/28/2022 06:20 AM    Creatinine 1.02 04/28/2022 06:20 AM    Calcium 9.2 04/28/2022 06:20 AM    Magnesium 2.1 04/27/2022 04:28 AM    Phosphorus 2.9 04/27/2022 04:28 AM      Lab Results   Component Value Date/Time    Alk. phosphatase 104 04/26/2022 05:24 PM    Protein, total 7.6 04/26/2022 05:24 PM    Albumin 4.4 04/26/2022 05:24 PM    Globulin 3.2 04/26/2022 05:24 PM     Lab Results   Component Value Date/Time    INR 1.1 04/26/2022 05:24 PM    Prothrombin time 10.5 04/26/2022 05:24 PM    aPTT 25.2 04/28/2022 10:01 AM      No results found for: IRON, FE, TIBC, IBCT, PSAT, FERR   No results found for: PH, PCO2, PO2  No components found for: [place]   Lab Results   Component Value Date/Time    CK 74 08/07/2009 11:21 AM                Total time:   Counseling / coordination time, spent as noted above:   > 50% counseling / coordination?:     Prolonged service was provided for  []30 min   []75 min in face to face time in the presence of the patient, spent as noted above. Time Start:   Time End:   Note: this can only be billed with 82467 (initial) or 85722 (follow up). If multiple start / stop times, list each separately.

## 2022-04-29 NOTE — HOSPICE
[facility]  [phone]    Patient Name: Sonu Stein  YOB: 1940  Age: 79 y.o. [facility] KAYLI RN Note:  Hospice consult noted. Chart reviewed. Plan of care discussed with patients nurse & care manager. In to meet with wife and daughters. Discussed Hospice philosophy, general plan of care, levels of care, services and on call procedures. Family information packet provided & reviewed with family. Family is very on board with hospice and state he cannot go home. He is however, comfortable at this time and not meeting criteria of GIP. Hospice will continue to follow for any change that would warrant a GIP admission. Daughter Wilbert Wheeler is MPOA: her email for docusign if needed for consents:   [email or web address]    Thank you for the opportunity to be of service to this patient.     Chucky Johnson RN  Clinical Nurse Liaison   [facility]  (S)[phone] (R) [phone]

## 2022-04-29 NOTE — PROGRESS NOTES
Speech Therapy Note:  Pt is currently not accepting PO per RN. Goals of care have been discussed and family has chosen hospice care. SLP will sign off at this time. Please re-consult if changes in condition. Recommendations:   If pt is awake/alert, cautiously pureed/thin for comfort  Low threshold to d/c if s/s of aspiration are present    Thank you,  Hans Martines M.S., CCC-SLP

## 2022-04-29 NOTE — PROGRESS NOTES
[facility] Adult  Hospitalist Group                                                                                          Hospitalist Progress Note  Dara Kan MD  Answering service: [phone] OR [phone] from in house phone        Date of Service:  2022  NAME:  Julia Miles  :  1940  MRN:  [id number]      Admission Summary:   Copied form admit: \" Estuardo Walsh a 79 y. o. with a past history of HTN, hypercholesterolemia, osteoarthritis who was admitted on 2022 from home via EMS after experiencing R sided weakness and aphasia. Given TPA due to concern for stroke. And then admitted to ICU after tpa.   Found to have thrombus causing severe stenosis of L ICA. Neuro IS consulted. Recommended vascular surgen eval for carotid endarterectomy, no endovascular intervention. MRI brain showing numerous acute/subacute nonhemorrhagic ischemia throughout L MCA territory. Able to work w/ therapy  (poor command following) and placed on puree diet w/ thin liquids but more lethargic today, not very arousable so changed from Plavix and ASA to heparin ggt. Palliative consulted, and family members decide  transition to comfort care \"    Interval history / Subjective:     2022 :    Cont on comfort measures only    [facility] as below        Assessment & Plan:     Brief HPI and Hospital Course:         Assessment and Plan:     1. Acute. L MCA infarcts : s/p tpa   Brain MRI: Numerous foci of acute or subacute nonhemorrhagic ischemia throughout the  left middle cerebral artery territory and left basal ganglia regions  -  Now comfort measures only     2. 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 AMS/Acute encephalopathy due to cva    4. Aphasia/dysphagia     5. Hypokalemia      Comfort care  Hospice consult may consider inpt hospice   No feeding tube.  No labs.           Hospital Problems  Never Reviewed          Codes Class Noted POA    * (Principal) CVA (cerebral vascular accident) [facility]) ICD-10-CM: I63.9  ICD-9-CM: 434.91  4/26/2022 Unknown                  After personally interviewing pt at bedside the following is noted on 4/29/2022 :    Review of Systems   Reason unable to perform ROS: non verbal               I had a face to face encounter with patient on 4/29/2022 at bedside for the following physical exam:     PHYSICAL EXAM:    Visit Vitals  BP (!) 163/77 (BP 1 Location: Right arm, BP Patient Position: At rest)   Pulse 65   Temp 99.6 °F (37.6 °C)   Resp 18   Ht 5' 6\" (1.676 m)   Wt 71 kg (156 lb 8.4 oz)   SpO2 90%   BMI 25.26 kg/m²          Physical Exam  Constitutional:       General: He is not in acute distress. Appearance: He is normal weight. HENT:      Head: Normocephalic and atraumatic. Right Ear: External ear normal.      Left Ear: External ear normal.   Cardiovascular:      Rate and Rhythm: Normal rate and regular rhythm. Pulmonary:      Effort: No respiratory distress. Skin:     Coloration: Skin is not jaundiced or pale. Data Review:    Review and/or order of clinical lab test      Labs:     Recent Labs     04/28/22  0613 04/27/22  0428   WBC 9.3 11.3*   HGB 14.2 15.4   HCT 42.4 45.6   * 165     Recent Labs     04/28/22  0620 04/27/22  0428 04/26/22  1724    138 140   K 3.0* 3.1* 3.7    105 103   CO2 27 25 26   BUN 14 16 20   CREA 1.02 1.26 1.36*   * 146* 101*   CA 9.2 10.1 10.2*   MG  --  2.1  --    PHOS  --  2.9  --      Recent Labs     04/26/22  1724   ALT 35      TBILI 1.1*   TP 7.6   ALB 4.4   GLOB 3.2     Recent Labs     04/28/22  1001 04/26/22  1724   INR  --  1.1   PTP  --  10.5   APTT 25.2  --       No results for input(s): FE, TIBC, PSAT, FERR in the last 72 hours. No results found for: FOL, RBCF   No results for input(s): PH, PCO2, PO2 in the last 72 hours. No results for input(s): CPK, CKNDX, TROIQ in the last 72 hours.     No lab exists for component: CPKMB  Lab Results   Component Value Date/Time    Cholesterol, total 187 04/27/2022 04:28 AM    HDL Cholesterol 66 04/27/2022 04:28 AM    LDL, calculated 107.2 (H) 04/27/2022 04:28 AM    Triglyceride 69 04/27/2022 04:28 AM    CHOL/HDL Ratio 2.8 04/27/2022 04:28 AM     Lab Results   Component Value Date/Time    Glucose (POC) 97 04/26/2022 05:12 PM    Glucose (POC) 94 02/13/2018 07:20 AM     Lab Results   Component Value Date/Time    Color YELLOW/STRAW 01/29/2018 08:49 AM    Appearance CLEAR 01/29/2018 08:49 AM    Specific gravity 1.019 01/29/2018 08:49 AM    pH (UA) 6.0 01/29/2018 08:49 AM    Protein NEGATIVE  01/29/2018 08:49 AM    Glucose NEGATIVE  01/29/2018 08:49 AM    Ketone NEGATIVE  01/29/2018 08:49 AM    Bilirubin NEGATIVE  01/29/2018 08:49 AM    Urobilinogen 0.2 01/29/2018 08:49 AM    Nitrites NEGATIVE  01/29/2018 08:49 AM    Leukocyte Esterase NEGATIVE  01/29/2018 08:49 AM    Epithelial cells FEW 01/29/2018 08:49 AM    Bacteria NEGATIVE  01/29/2018 08:49 AM    WBC 0-4 01/29/2018 08:49 AM    RBC 0-5 01/29/2018 08:49 AM         Medications Reviewed:     Current Facility-Administered Medications   Medication Dose Route Frequency    LORazepam (ATIVAN) injection 1 mg  1 mg IntraVENous Q15MIN PRN    glycopyrrolate (ROBINUL) injection 0.2 mg  0.2 mg IntraVENous Q4H PRN    scopolamine (TRANSDERM-SCOP) 1 mg over 3 days 1 Patch  1 Patch TransDERmal Q72H PRN    HYDROmorphone (DILAUDID) injection 0.5 mg  0.5 mg IntraVENous Q15MIN PRN    acetaminophen (TYLENOL) tablet 650 mg  650 mg Oral Q4H PRN    Or    acetaminophen (TYLENOL) solution 650 mg  650 mg Per NG tube Q4H PRN    Or    acetaminophen (TYLENOL) suppository 650 mg  650 mg Rectal Q4H PRN    ondansetron (ZOFRAN) injection 4 mg  4 mg IntraVENous Q6H PRN     ______________________________________________________________________  EXPECTED LENGTH OF STAY: 3d 4h  ACTUAL LENGTH OF STAY:          3                 Estherconnor Robbins MD

## 2022-04-29 NOTE — PROGRESS NOTES
Occupational Therapy:     Noted updated [address] now for comfort care/transition to hospice and OT orders have been discontinued. Thank you for the opportunity to be involved in Mr. Ja pugh.      Raheem Lee, JENNIFER, OTR/L

## 2022-04-29 NOTE — PROGRESS NOTES
4/29/2022 -   TRANSITIONS OF CARE PLAN:   1. RUR: 11%; LOW  2. DESTINATION: TBD  3. TRANSPORT: TBD  4. NEEDS FOR DISCHARGE: TBD  5. ANTICIPATED FOLLOW UPS: None  6. ONGOING INPATIENT NEEDS: Comfort Care, Hospice Consult Pending    Anticipated Discharge is:    CM notes that CM Team submitted a referral to [facility] via [address] [place] [address] on 4/28; patient has not been seen by [facility] at this time. CM contacted [facility] Zuleykaspencer Rodriguez: [phone]) to determine status of the referral.  Jonny Morgan to have the liaison [facility] contact this CM. CRM: Aureliano Lozano, MPH, [facility]; Z: [phone]    10:00 -   CM received a return call [facility] liaison [facility]: [phone]) who identified that the patient is likely not GIP appropriate at this time. Liaison to meet with family today, 4/29, to discuss possible options. Patient to remain Palliative/Comfort Care only at this time.   CRM: Aureliano Lozano, MPH, [address] [place] [address]; Z: [phone]

## 2022-04-30 PROCEDURE — 65270000029 HC RM PRIVATE

## 2022-04-30 NOTE — PROGRESS NOTES
[facility] [facility] Adult  Hospitalist Group                                                                                          Hospitalist Progress Note  oRsanne Humphreys MD  Answering service: [phone] OR [phone] from in house phone        Date of Service:  2022  NAME:  Sonu Stein  :  1940  MRN:  [id number]      Admission Summary:   Copied form admit: \" Gerry Betancourt a 79 y. o. with a past history of HTN, hypercholesterolemia, osteoarthritis who was admitted on 2022 from home via EMS after experiencing R sided weakness and aphasia. Given TPA due to concern for stroke. And then admitted to ICU after tpa.   Found to have thrombus causing severe stenosis of L ICA. Neuro IS consulted. Recommended vascular surgen eval for carotid endarterectomy, no endovascular intervention. MRI brain showing numerous acute/subacute nonhemorrhagic ischemia throughout L MCA territory. Able to work w/ therapy  (poor command following) and placed on puree diet w/ thin liquids but more lethargic today, not very arousable so changed from Plavix and ASA to heparin ggt. Palliative consulted, and family members decide  transition to comfort care \"    Interval history / Subjective:     2022 :    Cont on comfort measures only - same,    Is comfortable at bedside kenishaal     Mikayla Monique as below        Assessment & Plan:     Brief HPI and Hospital Course:         Assessment and Plan:     1. Acute. L MCA infarcts : s/p tpa   Brain MRI: Numerous foci of acute or subacute nonhemorrhagic ischemia throughout the  left middle cerebral artery territory and left basal ganglia regions  -  Now comfort measures only     2. 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 AMS/Acute encephalopathy due to cva    4. Aphasia/dysphagia     5. Hypokalemia      Comfort care  Hospice consult may consider inpt hospice   No feeding tube.  No labs.           Hospital Problems  Never Reviewed          Codes Class Noted POA    * (Principal) CVA (cerebral vascular accident) [facility]) ICD-10-CM: I63.9  ICD-9-CM: 434.91  4/26/2022 Unknown                  After personally interviewing pt at bedside the following is noted on 4/30/2022 :    Review of Systems   Reason unable to perform ROS: non verbal               I had a face to face encounter with patient on 4/30/2022 at bedside for the following physical exam:     PHYSICAL EXAM:    Visit Vitals  BP (!) 176/89 (BP 1 Location: Left arm, BP Patient Position: At rest)   Pulse 74   Temp 97.9 °F (36.6 °C)   Resp 20   Ht 5' 6\" (1.676 m)   Wt 71 kg (156 lb 8.4 oz)   SpO2 92%   BMI 25.26 kg/m²          Physical Exam  Constitutional:       General: He is not in acute distress. Appearance: He is normal weight. HENT:      Head: Normocephalic and atraumatic. Right Ear: External ear normal.      Left Ear: External ear normal.   Cardiovascular:      Rate and Rhythm: Normal rate and regular rhythm. Pulmonary:      Effort: No respiratory distress. Skin:     Coloration: Skin is not jaundiced or pale. Data Review:    Review and/or order of clinical lab test      Labs:     Recent Labs     04/28/22  0613   WBC 9.3   HGB 14.2   HCT 42.4   *     Recent Labs     04/28/22  0620      K 3.0*      CO2 27   BUN 14   CREA 1.02   *   CA 9.2     No results for input(s): ALT, AP, TBIL, TBILI, TP, ALB, GLOB, GGT, AML, LPSE in the last 72 hours. No lab exists for component: SGOT, GPT, AMYP, HLPSE  Recent Labs     04/28/22  1001   APTT 25.2      No results for input(s): FE, TIBC, PSAT, FERR in the last 72 hours. No results found for: FOL, RBCF   No results for input(s): PH, PCO2, PO2 in the last 72 hours. No results for input(s): CPK, CKNDX, TROIQ in the last 72 hours.     No lab exists for component: CPKMB  Lab Results   Component Value Date/Time    Cholesterol, total 187 04/27/2022 04:28 AM    HDL Cholesterol 66 04/27/2022 04:28 AM    LDL, calculated 107.2 (H) 04/27/2022 04:28 AM    Triglyceride 69 04/27/2022 04:28 AM    CHOL/HDL Ratio 2.8 04/27/2022 04:28 AM     Lab Results   Component Value Date/Time    Glucose (POC) 97 04/26/2022 05:12 PM    Glucose (POC) 94 02/13/2018 07:20 AM     Lab Results   Component Value Date/Time    Color YELLOW/STRAW 01/29/2018 08:49 AM    Appearance CLEAR 01/29/2018 08:49 AM    Specific gravity 1.019 01/29/2018 08:49 AM    pH (UA) 6.0 01/29/2018 08:49 AM    Protein NEGATIVE  01/29/2018 08:49 AM    Glucose NEGATIVE  01/29/2018 08:49 AM    Ketone NEGATIVE  01/29/2018 08:49 AM    Bilirubin NEGATIVE  01/29/2018 08:49 AM    Urobilinogen 0.2 01/29/2018 08:49 AM    Nitrites NEGATIVE  01/29/2018 08:49 AM    Leukocyte Esterase NEGATIVE  01/29/2018 08:49 AM    Epithelial cells FEW 01/29/2018 08:49 AM    Bacteria NEGATIVE  01/29/2018 08:49 AM    WBC 0-4 01/29/2018 08:49 AM    RBC 0-5 01/29/2018 08:49 AM         Medications Reviewed:     Current Facility-Administered Medications   Medication Dose Route Frequency    LORazepam (ATIVAN) injection 1 mg  1 mg IntraVENous Q15MIN PRN    glycopyrrolate (ROBINUL) injection 0.2 mg  0.2 mg IntraVENous Q4H PRN    scopolamine (TRANSDERM-SCOP) 1 mg over 3 days 1 Patch  1 Patch TransDERmal Q72H PRN    HYDROmorphone (DILAUDID) injection 0.5 mg  0.5 mg IntraVENous Q15MIN PRN    acetaminophen (TYLENOL) tablet 650 mg  650 mg Oral Q4H PRN    Or    acetaminophen (TYLENOL) solution 650 mg  650 mg Per NG tube Q4H PRN    Or    acetaminophen (TYLENOL) suppository 650 mg  650 mg Rectal Q4H PRN    ondansetron (ZOFRAN) injection 4 mg  4 mg IntraVENous Q6H PRN     ______________________________________________________________________  EXPECTED LENGTH OF STAY: 3d 4h  ACTUAL LENGTH OF STAY:          4                 Yoselin Rodrigues MD

## 2022-04-30 NOTE — PROGRESS NOTES
Problem: Aspiration - Risk of  Goal: *Absence of aspiration  Outcome: Progressing Towards Goal     Problem: Pressure Injury - Risk of  Goal: *Prevention of pressure injury  Description: Document Den Scale and appropriate interventions in the flowsheet. Outcome: Progressing Towards Goal  Note: Pressure Injury Interventions:  Sensory Interventions: Float heels    Moisture Interventions: Absorbent underpads    Activity Interventions: Pressure redistribution bed/mattress(bed type)    Mobility Interventions: Float heels    Nutrition Interventions: Document food/fluid/supplement intake    Friction and Shear Interventions: Minimize layers                Problem: Falls - Risk of  Goal: *Absence of Falls  Description: Document Jennifer Fall Risk and appropriate interventions in the flowsheet.   Outcome: Progressing Towards Goal  Note: Fall Risk Interventions:       Mentation Interventions: Bed/chair exit alarm    Medication Interventions: Assess postural VS orthostatic hypotension    Elimination Interventions: Call light in reach,Toileting schedule/hourly rounds              Problem: Patient Education: Go to Patient Education Activity  Goal: Patient/Family Education  Outcome: Progressing Towards Goal     Problem: Patient Education: Go to Patient Education Activity  Goal: Patient/Family Education  Outcome: Progressing Towards Goal

## 2022-04-30 NOTE — PROGRESS NOTES
Spiritual Care Assessment/Progress Note  [facility]      NAME: Troy Miller      MRN: [id number]  AGE: 79 y.o. SEX: male  Hoahaoism Affiliation: Worship   Language: English     4/30/2022     Total Time (in minutes): 5     Spiritual Assessment begun in [facility] through conversation with:         []Patient        [] Family    [] Friend(s)        Reason for Consult: Palliative Care, Initial/Spiritual Assessment     Spiritual beliefs: (Please include comment if needed)     [] Identifies with a westley tradition:         [] Supported by a westley community:            [] Claims no spiritual orientation:           [] Seeking spiritual identity:                [] Adheres to an individual form of spirituality:           [x] Not able to assess:                           Identified resources for coping:      [] Prayer                               [] Music                  [] Guided Imagery     [] Family/friends                 [] Pet visits     [] Devotional reading                         [x] Unknown     [] Other:                                               Interventions offered during this visit: (See comments for more details)                Plan of Care:     [] Support spiritual and/or cultural needs    [] Support AMD and/or advance care planning process      [] Support grieving process   [] Coordinate Rites and/or Rituals    [] Coordination with community clergy   [] No spiritual needs identified at this time   [] Detailed Plan of Care below (See Comments)  [] Make referral to Music Therapy  [] Make referral to Pet Therapy     [] Make referral to Addiction services  [] Make referral to [facility]  [] Make referral to Spiritual Care Partner  [] No future visits requested        [x] Contact Spiritual Care for further referrals     Comments: Attempted Initial Spiritual Assessment for this pt in [place] [address]. Pt was resting and therefore unable to be assessed at this time.   No family/friends present at time of visit. Contact Spiritual Care Services for any spiritual or emotional support needs. Gerald Rivera MDiv.  Staff   Request  Support/Spiritual Care Services on [phone])

## 2022-05-01 PROCEDURE — 74011250636 HC RX REV CODE- 250/636: Performed by: PHYSICAL MEDICINE & REHABILITATION

## 2022-05-01 PROCEDURE — 65270000029 HC RM PRIVATE

## 2022-05-01 RX ADMIN — HYDROMORPHONE HYDROCHLORIDE 0.5 MG: 1 INJECTION, SOLUTION INTRAMUSCULAR; INTRAVENOUS; SUBCUTANEOUS at 12:14

## 2022-05-01 NOTE — PROGRESS NOTES
Physical Therapy 5/1/2022    Orders received and chart reviewed up to date. Pt with comfort care measures, skilled PT not appropriate. Will complete orders. Thank you.   Sixto Oakes, PT, DPT

## 2022-05-01 NOTE — PROGRESS NOTES
Occupational Therapy    New orders received, acknowledged, and patient chart reviewed up to date. Note patient pursing comfort options, thus OT inappropriate at this time. Will sign off. Thank you.   Lou Padilla, OTR/L

## 2022-05-01 NOTE — HOSPICE
Hospice Liaison Note:    Chart reviewed for updates in plan of care. Update from bedside nurse; patient has been awake and communicating with staff. Pt has not been symptomatic, requiring PRN dosing or IV route dosing. Vitals are stable. Plan: Patient does not meet [facility] Services. Patient does meet Routine Hospice criteria, which could begin when patient has been discharged, however, patient home address is Outside of [address] [place] [address] service area. Please call Hospice team to offer support for patient, family or staff. Thank you for your coordination with the hospice plan of care. 10:15: Bedside rounding. Pt appears to be sleeping. No family or visitors. 12:00: Reviewed patient status with Dr. Mitzi Fallon who was available on the unit. Offered patient was not symptomatic, appeared comfortable, that we could assist admitting into hospice services once discharge location had been arranged. [facility] will need to review discharge location for serviceability as well. Currently, [facility] can not service patient at his home address as it is outside of [facility] [place] service area. Dr. Mitzi Fallon stated understanding. 14:30: Updated CM on call via [employer]; patient is not meeting [facility] Inpatient Hospice criteria. [address] [place] [address] is available to continue with Hospice support as family arrange for discharge location. 17:00: Spoke with after hours Rachel CHENEY. Alerted Brooklynn Mccartney to this Hospice note to follow-up on Monday.     Amanuel Hernandez RN, [facility] [address] Nurse Liaison  [phone] [place]  [phone] Office

## 2022-05-01 NOTE — PROGRESS NOTES
SLP Contact Note    Reconsult received. Pt is pursuing comfort measures. Recommend continuing puree/thins for comfort if awake/alert. Further diet modifications not indicated. Do not force feed. Will sign off.       Thank you,  GERMAINE HollisEd, [id number] [facility]  Speech-Language Pathologist

## 2022-05-01 NOTE — PROGRESS NOTES
[facility] Adult  Hospitalist Group                                                                                          Hospitalist Progress Note  Karthik Watson MD  Answering service: [phone] or [phone] from in house phone        Date of Service:  2022  NAME:  Joseph AGUILAR:  1940  MRN:  [id number]      Admission Summary:   Copied form admit: \" Cindy Acosta a 79 y. o. with a past history of HTN, hypercholesterolemia, osteoarthritis who was admitted on 2022 from home via EMS after experiencing R sided weakness and aphasia. Given TPA due to concern for stroke. And then admitted to ICU after tpa.   Found to have thrombus causing severe stenosis of L ICA. Neuro IS consulted. Recommended vascular surgen eval for carotid endarterectomy, no endovascular intervention. MRI brain showing numerous acute/subacute nonhemorrhagic ischemia throughout L MCA territory. Able to work w/ therapy  (poor command following) and placed on puree diet w/ thin liquids but more lethargic today, not very arousable so changed from Plavix and ASA to heparin ggt. Palliative consulted, and family members decide  transition to comfort care \"    Interval history / Subjective:     2022 :    Discussed w daughter  Helena Figueroa as comfort measures only    Daughter is hopeful that pt will be inpt/hospice house appropriate   Though now talking, is not doing well and is comfortable and cont to use comfort as goal forward    Low grade temp.  Family aware that pt is declining over the improved status noted yesterday and that pt is losing interest in interacting and eating. ; demise is expected    Else:         Assessment & Plan:     Brief HPI and Hospital Course:         Assessment and Plan:     1. Acute.  L MCA infarcts : s/p tpa   Brain MRI: Numerous foci of acute or subacute nonhemorrhagic ischemia throughout the  left middle cerebral artery territory and left basal ganglia regions  -  Now comfort measures only     2. 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 AMS/Acute encephalopathy due to cva    4. Aphasia/dysphagia     5. Hypokalemia      Comfort care  Hospice consult may consider inpt hospice   No feeding tube. No labs.           Hospital Problems  Never Reviewed          Codes Class Noted POA    * (Principal) CVA (cerebral vascular accident) [facility]) ICD-10-CM: I63.9  ICD-9-CM: 434.91  4/26/2022 Unknown                  After personally interviewing pt at bedside the following is noted on 5/1/2022 :    Review of Systems   Reason unable to perform ROS: little interest in talking               I had a face to face encounter with patient on 5/1/2022 at bedside for the following physical exam:     PHYSICAL EXAM:    Visit Vitals  BP (!) 145/76 (BP 1 Location: Right upper arm, BP Patient Position: At rest)   Pulse (!) 56   Temp 99.3 °F (37.4 °C)   Resp 17   Ht 5' 6\" (1.676 m)   Wt 71 kg (156 lb 8.4 oz)   SpO2 92%   BMI 25.26 kg/m²          Physical Exam  Constitutional:       General: He is not in acute distress. Appearance: He is normal weight. HENT:      Head: Normocephalic and atraumatic. Right Ear: External ear normal.      Left Ear: External ear normal.   Cardiovascular:      Rate and Rhythm: Normal rate and regular rhythm. Pulmonary:      Effort: No respiratory distress. Skin:     Coloration: Skin is not jaundiced or pale. Neurological:      Comments: Rue , rt facial, moves rt leg on command ; is verbal but w little interest in eating, interacting w this writer today . Data Review:    Review and/or order of clinical lab test      Labs:     No results for input(s): WBC, HGB, HCT, PLT, HGBEXT, HCTEXT, PLTEXT, HGBEXT, HCTEXT, PLTEXT in the last 72 hours. No results for input(s): NA, K, CL, CO2, BUN, CREA, GLU, CA, MG, PHOS, URICA in the last 72 hours. No results for input(s): ALT, AP, TBIL, TBILI, TP, ALB, GLOB, GGT, AML, LPSE in the last 72 hours.     No lab exists for component: SGOT, GPT, AMYP, HLPSE  Recent Labs     04/28/22  1001   APTT 25.2      No results for input(s): FE, TIBC, PSAT, FERR in the last 72 hours. No results found for: FOL, RBCF   No results for input(s): PH, PCO2, PO2 in the last 72 hours. No results for input(s): CPK, CKNDX, TROIQ in the last 72 hours.     No lab exists for component: CPKMB  Lab Results   Component Value Date/Time    Cholesterol, total 187 04/27/2022 04:28 AM    HDL Cholesterol 66 04/27/2022 04:28 AM    LDL, calculated 107.2 (H) 04/27/2022 04:28 AM    Triglyceride 69 04/27/2022 04:28 AM    CHOL/HDL Ratio 2.8 04/27/2022 04:28 AM     Lab Results   Component Value Date/Time    Glucose (POC) 97 04/26/2022 05:12 PM    Glucose (POC) 94 02/13/2018 07:20 AM     Lab Results   Component Value Date/Time    Color YELLOW/STRAW 01/29/2018 08:49 AM    Appearance CLEAR 01/29/2018 08:49 AM    Specific gravity 1.019 01/29/2018 08:49 AM    pH (UA) 6.0 01/29/2018 08:49 AM    Protein NEGATIVE  01/29/2018 08:49 AM    Glucose NEGATIVE  01/29/2018 08:49 AM    Ketone NEGATIVE  01/29/2018 08:49 AM    Bilirubin NEGATIVE  01/29/2018 08:49 AM    Urobilinogen 0.2 01/29/2018 08:49 AM    Nitrites NEGATIVE  01/29/2018 08:49 AM    Leukocyte Esterase NEGATIVE  01/29/2018 08:49 AM    Epithelial cells FEW 01/29/2018 08:49 AM    Bacteria NEGATIVE  01/29/2018 08:49 AM    WBC 0-4 01/29/2018 08:49 AM    RBC 0-5 01/29/2018 08:49 AM         Medications Reviewed:     Current Facility-Administered Medications   Medication Dose Route Frequency    LORazepam (ATIVAN) injection 1 mg  1 mg IntraVENous Q15MIN PRN    glycopyrrolate (ROBINUL) injection 0.2 mg  0.2 mg IntraVENous Q4H PRN    scopolamine (TRANSDERM-SCOP) 1 mg over 3 days 1 Patch  1 Patch TransDERmal Q72H PRN    HYDROmorphone (DILAUDID) injection 0.5 mg  0.5 mg IntraVENous Q15MIN PRN    acetaminophen (TYLENOL) tablet 650 mg  650 mg Oral Q4H PRN    Or    acetaminophen (TYLENOL) solution 650 mg  650 mg Per NG tube Q4H PRN    Or    acetaminophen (TYLENOL) suppository 650 mg  650 mg Rectal Q4H PRN    ondansetron (ZOFRAN) injection 4 mg  4 mg IntraVENous Q6H PRN     ______________________________________________________________________  EXPECTED LENGTH OF STAY: 3d 4h  ACTUAL LENGTH OF STAY:          Leah Matthews MD

## 2022-05-02 PROCEDURE — 65270000029 HC RM PRIVATE

## 2022-05-02 PROCEDURE — 99233 SBSQ HOSP IP/OBS HIGH 50: CPT | Performed by: PHYSICAL MEDICINE & REHABILITATION

## 2022-05-02 RX ORDER — LORAZEPAM 2 MG/ML
1 CONCENTRATE ORAL
Status: DISCONTINUED | OUTPATIENT
Start: 2022-05-02 | End: 2022-05-07 | Stop reason: HOSPADM

## 2022-05-02 RX ORDER — MORPHINE SULFATE 20 MG/ML
10 SOLUTION ORAL
Status: DISCONTINUED | OUTPATIENT
Start: 2022-05-02 | End: 2022-05-07 | Stop reason: HOSPADM

## 2022-05-02 NOTE — PROGRESS NOTES
[facility] May 2, 2022       RE: An Duncan      To Whom It May Concern,    This is to certify that An Duncan was admitted to [facility] on April 26,2022 requiring acute medial care. He still remains in the hospital.    Please feel free to contact my office if you have any questions or concerns. Thank you for your assistance in this matter.       Sincerely,  Capri Villanueva MD

## 2022-05-02 NOTE — PROGRESS NOTES
Physical Therapy 5/2/2022    New orders received and chart reviewed up to date. Family leaving room reporting patient fell asleep and requesting to return later. Will follow-up as appropriate. Thank you.   Diana Oakes, PT, DPT

## 2022-05-02 NOTE — PROGRESS NOTES
Palliative Medicine Consult  [place]: [phone] ([phone]    Patient Name: Jourdan Garcia  YOB: 1940    Date of Initial Consult: 4/28/22  Reason for Consult: Care decisions   Requesting Provider: Jose   Primary Care Physician: Thomas Umaña MD     SUMMARY:   Jourdan Garcia is a 79 y.o. with a past history of HTN, hypercholesterolemia, osteoarthritis who was admitted on 4/26/2022 from home via EMS after experiencing R sided weakness and aphasia. Given TPA due to concern for stroke. Found to have thrombus causing severe stenosis of L ICA. Neuro IS consulted. Recommended eval for carotid endarterectomy, no endovascular intervention. MRI brain showing numerous acute/subacute nonhemorrhagic ischemia throughout L MCA territory. Able to work w/ therapy 4/27 (poor command following) and placed on puree diet w/ thin liquids but more lethargic today, not very arousable so changed from Plavix and ASA to heparin ggt. Comfort measures only 4/28.  5/2- Pt awake and alert x 4. Current medical issues leading to Palliative Medicine involvement include: care decisions. Social: Pt  x 51 years to Emilia Waddell (who was hospice volunteer in past)- Emilia Waddell has moderate dementia. They have 2 daughters- Daily Wolf (in [place], is a OB/Gyn NP, her  is an ED physician) and Jerrica Ramirez ([place], [place]). Pt worked w/ [employer], many degrees incl in [address] [place] [address]. He would often travel to [place] to help Daily Wolf in her garden. [employer] ball champion. *mPOA is dtr Daily Wolf. PALLIATIVE DIAGNOSES:   1. L MCA infarcts, severe L ICA stenosis  2. Significant improvement 5/2/22. 3. Goals of care        PLAN:   1. Pt with comfort measures only since 4/28- as family really thinking about what is important to patient and has high standard of quality of life.    2. Pt over weekend has made very significant improvements- is talking w/out noticeable aphasia, drinking, did not want puree diet but does endorse hunger- and can tell me that he is in the hospital after having a stroke. His  and friend Helga Dakins at bedside- he is okay with interventions thus far, agrees with having his dtr Susan Diaz help him make decisions, does not think he wants aggressive measures. He is most worried about getting everything taken care of for his wife Frnak Murillo.   3. Part of conversation when [facility] hospice RN speaks w/ mPOA dtr Susan Diaz who is pleased w/ improvements but also concerned about quality of life overall. 4. Talk about getting PT/OT to evaluate to see rehab potential. Pt moving b/l extremities, speaking in full sentences and completely independent prior to admission. 5. I think restarting some home medications (anti HTN) and medications to prevent another stroke (ASAm Plavix) would be reasonable to consider. Family would like to wait on starting home meds and blood work for now. 6. We did not bring this up with family today, but I did talk to pt about it- depending on how he does w/ therapies I also think that it would be reasonable to consider CEA or other intervention for >95% stenosis of L ICA. I will bring this up w/ family tomorrow. 7. Plan: Pt remains \"comfort measures only\" as family does not want transfer off floor, but will get PT/OT to eval for rehab potential.   8. Upgraded to easy to chew diet. If has difficulty w/ reconsult SLP for formal eval (discussed w/ SLP this AM). 9. Following w/ you. 10. Communicated plan of care with: Palliative IDTNiles[facility] Team incl [facility] and [place] w/ hospice, Dr Jaja Nunez SLP, [facility] care manager- also to be discussed at rounds. GOALS OF CARE / TREATMENT PREFERENCES:     GOALS OF CARE:  Patient/Health Care Proxy Stated Goals:  Other (comment) (To evaluate daily)    TREATMENT PREFERENCES:   Code Status: DNR    Patient and family's personal goals include: to die w/ comfort       Advance Care Planning:  [x] [facility] Interdisciplinary Team has updated the ACP Navigator with Health Care Decision Maker and Patient Capacity      Primary Decision Maker: John Carrera - Daughter - [phone]  Advance Care Planning 2/13/2018   Patient's Healthcare Decision Maker is: Legal Next of Kin   Primary Decision Maker Name -   Primary Decision Maker Phone Number -   Confirm Advance Directive Yes, not on file       Medical Interventions: Limited additional interventions       Other:    As far as possible, the palliative care team has discussed with patient / health care proxy about goals of care / treatment preferences for patient. HISTORY:     History obtained from: Chart, staff, family , pt   CHIEF COMPLAINT: \"I had a stroke\"      HPI/SUBJECTIVE:    The patient is:   [x] Verbal and participatory  [] Non-participatory due to: medical situation     Pt awake, sitting up in bed, appropriate affect, denies pain or shortness of breath.      Clinical Pain Assessment (nonverbal scale for severity on nonverbal patients):   Clinical Pain Assessment  Severity: 0     Activity (Movement): Lying quietly, normal position    Duration: for how long has pt been experiencing pain (e.g., 2 days, 1 month, years)  Frequency: how often pain is an issue (e.g., several times per day, once every few days, constant)     FUNCTIONAL ASSESSMENT:     Palliative Performance Scale (PPS):  PPS: 50       PSYCHOSOCIAL/SPIRITUAL SCREENING:     Palliative IDT has assessed this patient for cultural preferences / practices and a referral made as appropriate to needs (Cultural Services, Patient Advocacy, Ethics, etc.)    Any spiritual / Tenriism concerns:  [] Yes /  [x] No   If \"Yes\" to discuss with pastoral care during IDT     Does caregiver feel burdened by caring for their loved one:   [] Yes /  [x] No /  [] No Caregiver Present    If \"Yes\" to discuss with social work during IDT    Anticipatory grief assessment:   [x] Normal  / [] Maladaptive     If \"Maladaptive\" to discuss with social work during IDT    ESAS Anxiety: Anxiety: 0    ESAS Depression: Depression: 0     Cannot obtain due to patient factors     REVIEW OF SYSTEMS:     Positive and pertinent negative findings in ROS are noted above in HPI. The following systems were [x] reviewed / [] unable to be reviewed as noted in HPI  Other findings are noted below. Systems: constitutional, ears/nose/mouth/throat, respiratory, gastrointestinal, genitourinary, musculoskeletal, integumentary, neurologic, psychiatric, endocrine. Positive findings noted below. Modified ESAS Completed by: provider   Fatigue: 5 Drowsiness: 0   Depression: 0 Pain: 0   Anxiety: 0 Nausea: 0   Anorexia: 5 Dyspnea: 0                    PHYSICAL EXAM:     From RN flowsheet:  Wt Readings from Last 3 Encounters:   04/29/22 156 lb 8.4 oz (71 kg)   01/01/22 165 lb (74.8 kg)   02/13/18 162 lb (73.5 kg)     Blood pressure (!) 179/79, pulse (!) 53, temperature 98.2 °F (36.8 °C), resp. rate 17, height 5' 6\" (1.676 m), weight 156 lb 8.4 oz (71 kg), SpO2 94 %.     Pain Scale 1: Numeric (0 - 10)  Pain Intensity 1: 0                 Last bowel movement, if known:     Constitutional: awake, alert x 4, speech clear, speaking in full sentences  Eyes: pupils equal   Respiratory: breathing not labored  Musculoskeletal: no deformity, no tenderness to palpation  Skin: warm, dry  Neurologic: following commands and moving all extremities      HISTORY:     Principal Problem:    CVA (cerebral vascular accident) ([facility] [address] (4/26/2022)      Past Medical History:   Diagnosis Date    Ankle fracture 3/2000    Right    Arthritis     Cancer ([address]     SCCA    Hypercholesterolemia     Hypertension     Personal history of kidney stones 1968    Prostatitis 2001    Psychiatric disorder     CLAUSTROPHOBIA; MASK OVER FACE WOULD CAUSE ANXIETY; HAS REACTED TO TIGHT SPACE (UNDER HOUSE)    Unspecified adverse effect of anesthesia     mother difficult to arouse post anesthesia      Past Surgical History:   Procedure Laterality Date    HX COLONOSCOPY      HX HERNIA REPAIR  4602    UMBILICAL    HX ORTHOPAEDIC Right 2000    ORIF ANKLE WITH PINS    HX ORTHOPAEDIC Right 2017    FOOT SURGERY    HX ORTHOPAEDIC Left 1980s    FOOT SURGERY      Family History   Problem Relation Age of Onset    Dementia Mother     Anesth Problems Mother         DIFFICULT TO AROUSE; DID NOT INVOLVE LENGTHY VENTILATOR USE.  Emphysema Father       History reviewed, no pertinent family history. Social History     Tobacco Use    Smoking status: Never Smoker    Smokeless tobacco: Never Used   Substance Use Topics    Alcohol use:  Yes     Alcohol/week: 3.3 standard drinks     Types: 4 Glasses of wine per week     Allergies   Allergen Reactions    Ace Inhibitors Cough    Percocet [Oxycodone-Acetaminophen] Nausea and Vomiting     \"I DON'T TOLERATE THE STRONG OPIODS\"      Current Facility-Administered Medications   Medication Dose Route Frequency    morphine (ROXANOL) 100 mg/5 mL (20 mg/mL) concentrated solution 10 mg  10 mg Oral Q3H PRN    LORazepam (INTENSOL) 2 mg/mL oral concentrate 1 mg  1 mg Oral Q4H PRN    glycopyrrolate (ROBINUL) injection 0.2 mg  0.2 mg IntraVENous Q4H PRN    scopolamine (TRANSDERM-SCOP) 1 mg over 3 days 1 Patch  1 Patch TransDERmal Q72H PRN    acetaminophen (TYLENOL) tablet 650 mg  650 mg Oral Q4H PRN    Or    acetaminophen (TYLENOL) solution 650 mg  650 mg Per NG tube Q4H PRN    Or    acetaminophen (TYLENOL) suppository 650 mg  650 mg Rectal Q4H PRN    ondansetron (ZOFRAN) injection 4 mg  4 mg IntraVENous Q6H PRN          LAB AND IMAGING FINDINGS:     Lab Results   Component Value Date/Time    WBC 9.3 04/28/2022 06:13 AM    HGB 14.2 04/28/2022 06:13 AM    PLATELET 335 (L) 05/90/2253 06:13 AM     Lab Results   Component Value Date/Time    Sodium 141 04/28/2022 06:20 AM    Potassium 3.0 (L) 04/28/2022 06:20 AM    Chloride 106 04/28/2022 06:20 AM    CO2 27 04/28/2022 06:20 AM    BUN 14 04/28/2022 06:20 AM    Creatinine 1.02 04/28/2022 06:20 AM    Calcium 9.2 04/28/2022 06:20 AM    Magnesium 2.1 04/27/2022 04:28 AM    Phosphorus 2.9 04/27/2022 04:28 AM      Lab Results   Component Value Date/Time    Alk. phosphatase 104 04/26/2022 05:24 PM    Protein, total 7.6 04/26/2022 05:24 PM    Albumin 4.4 04/26/2022 05:24 PM    Globulin 3.2 04/26/2022 05:24 PM     Lab Results   Component Value Date/Time    INR 1.1 04/26/2022 05:24 PM    Prothrombin time 10.5 04/26/2022 05:24 PM    aPTT 25.2 04/28/2022 10:01 AM      No results found for: IRON, FE, TIBC, IBCT, PSAT, FERR   No results found for: PH, PCO2, PO2  No components found for: [place]   Lab Results   Component Value Date/Time    CK 74 08/07/2009 11:21 AM                Total time: 35 min   Counseling / coordination time, spent as noted above: 25 min  > 50% counseling / coordination?: yes    Prolonged service was provided for  []30 min   []75 min in face to face time in the presence of the patient, spent as noted above. Time Start:   Time End:   Note: this can only be billed with 14398 (initial) or 36876 (follow up). If multiple start / stop times, list each separately.

## 2022-05-02 NOTE — PROGRESS NOTES
VIMAL:  1. RUR-11%  2. PT/OT- possible skilled rehab to transition to hospice if needed. 3. BLS transport. CM [facility] in IDR[facility], [facility] patient daughter this morning about pt/ot evals and work on placement. CM also spoke to his daughter briefly about disposition prior to hospice calling her. His daughter, Leticia Aguilar is open to the idea of him going to a skilled unit and transition to hospice if appropriate and that the family could pay room and board. CM will speak with facilities and obtain cost information. 1- CM spoke with patient daughter via telephone to discuss pricing options for  Room and board of [employer] ($409/ day) and [employer] ($376/day) both would require financial application prior to admission. CM left a voicemail for Jacob Aguilar at [place], [phone] waiting return call back. Patient daughter requesting cm to contact [facility] and [email or web address] in [facility]. VM left at both. [facility], [phone]. [address], [facility] or [facility]. CM will update his daughter when information is obtained.      [facility]

## 2022-05-02 NOTE — PROGRESS NOTES
[address] [facility] Adult  Hospitalist Group                                                                                          Hospitalist Progress Note  Rosenda Noble MD  Answering service: [phone] OR [phone] from in house phone        Date of Service:  2022  NAME:  Blanca Armstrong  :  1940  MRN:  [id number]      Admission Summary:   Copied form admit: \" Luis Wiseman a 79 y. o. with a past history of HTN, hypercholesterolemia, osteoarthritis who was admitted on 2022 from home via EMS after experiencing R sided weakness and aphasia. Given TPA due to concern for stroke. And then admitted to ICU after tpa.   Found to have thrombus causing severe stenosis of L ICA. Neuro IS consulted. Recommended vascular surgen eval for carotid endarterectomy, no endovascular intervention. MRI brain showing numerous acute/subacute nonhemorrhagic ischemia throughout L MCA territory. Able to work w/ therapy  (poor command following) and placed on puree diet w/ thin liquids but more lethargic today, not very arousable so changed from Plavix and ASA to heparin ggt. Palliative consulted, and family members decide  transition to comfort care \"    Interval history / Subjective:     2022 :    More alert, did drink OJ w/o difficulty this am; refuses the puree diet   Cont on hospice watch   To discuss w family as to disposition options today    Else:        Assessment & Plan:     Brief HPI and Hospital Course:         Assessment and Plan:     1. Acute. L MCA infarcts : s/p tpa   Brain MRI: Numerous foci of acute or subacute nonhemorrhagic ischemia throughout the  left middle cerebral artery territory and left basal ganglia regions  -  Now comfort measures only     2. 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 AMS/Acute encephalopathy due to cva    4. Aphasia/dysphagia   - neymar liq and will occ eat puree as of am rounds 2022     5.  Hypokalemia   Lab Results   Component Value Date/Time    Potassium 3.0 (L) 04/28/2022 06:20 AM    prelace liquid  As comfort measurs only this will not be done and cont to seek hospice options      Comfort care only orders in place   Hospice consult may consider inpt hospice   No feeding tube. No labs.           Hospital Problems  Never Reviewed          Codes Class Noted POA    * (Principal) CVA (cerebral vascular accident) [facility]) ICD-10-CM: I63.9  ICD-9-CM: 434.91  4/26/2022 Unknown                  After personally interviewing pt at bedside the following is noted on 5/2/2022 :    Review of Systems   Reason unable to perform ROS: little interest in talking               I had a face to face encounter with patient on 5/2/2022 at bedside for the following physical exam:     PHYSICAL EXAM:    Visit Vitals  BP (!) 179/79 (BP 1 Location: Right arm, BP Patient Position: At rest)   Pulse (!) 53   Temp 98.2 °F (36.8 °C)   Resp 17   Ht 5' 6\" (1.676 m)   Wt 71 kg (156 lb 8.4 oz)   SpO2 94%   BMI 25.26 kg/m²          Physical Exam  Constitutional:       General: He is not in acute distress. Appearance: He is normal weight. HENT:      Head: Normocephalic and atraumatic. Right Ear: External ear normal.      Left Ear: External ear normal.   Cardiovascular:      Rate and Rhythm: Normal rate and regular rhythm. Pulmonary:      Effort: No respiratory distress. Skin:     Coloration: Skin is not jaundiced or pale. Neurological:      Comments: Rue , rt facial, moves rt leg on command ; is verbal but w little interest in eating, interacting w this writer today . Data Review:    Review and/or order of clinical lab test      Labs:     No results for input(s): WBC, HGB, HCT, PLT, HGBEXT, HCTEXT, PLTEXT, HGBEXT, HCTEXT, PLTEXT in the last 72 hours. No results for input(s): NA, K, CL, CO2, BUN, CREA, GLU, CA, MG, PHOS, URICA in the last 72 hours.   No results for input(s): ALT, AP, TBIL, TBILI, TP, ALB, GLOB, GGT, AML, LPSE in the last 72 hours.    No lab exists for component: SGOT, GPT, AMYP, HLPSE  No results for input(s): INR, PTP, APTT, INREXT, INREXT in the last 72 hours. No results for input(s): FE, TIBC, PSAT, FERR in the last 72 hours. No results found for: FOL, RBCF   No results for input(s): PH, PCO2, PO2 in the last 72 hours. No results for input(s): CPK, CKNDX, TROIQ in the last 72 hours.     No lab exists for component: CPKMB  Lab Results   Component Value Date/Time    Cholesterol, total 187 04/27/2022 04:28 AM    HDL Cholesterol 66 04/27/2022 04:28 AM    LDL, calculated 107.2 (H) 04/27/2022 04:28 AM    Triglyceride 69 04/27/2022 04:28 AM    CHOL/HDL Ratio 2.8 04/27/2022 04:28 AM     Lab Results   Component Value Date/Time    Glucose (POC) 97 04/26/2022 05:12 PM    Glucose (POC) 94 02/13/2018 07:20 AM     Lab Results   Component Value Date/Time    Color YELLOW/STRAW 01/29/2018 08:49 AM    Appearance CLEAR 01/29/2018 08:49 AM    Specific gravity 1.019 01/29/2018 08:49 AM    pH (UA) 6.0 01/29/2018 08:49 AM    Protein NEGATIVE  01/29/2018 08:49 AM    Glucose NEGATIVE  01/29/2018 08:49 AM    Ketone NEGATIVE  01/29/2018 08:49 AM    Bilirubin NEGATIVE  01/29/2018 08:49 AM    Urobilinogen 0.2 01/29/2018 08:49 AM    Nitrites NEGATIVE  01/29/2018 08:49 AM    Leukocyte Esterase NEGATIVE  01/29/2018 08:49 AM    Epithelial cells FEW 01/29/2018 08:49 AM    Bacteria NEGATIVE  01/29/2018 08:49 AM    WBC 0-4 01/29/2018 08:49 AM    RBC 0-5 01/29/2018 08:49 AM         Medications Reviewed:     Current Facility-Administered Medications   Medication Dose Route Frequency    LORazepam (ATIVAN) injection 1 mg  1 mg IntraVENous Q15MIN PRN    glycopyrrolate (ROBINUL) injection 0.2 mg  0.2 mg IntraVENous Q4H PRN    scopolamine (TRANSDERM-SCOP) 1 mg over 3 days 1 Patch  1 Patch TransDERmal Q72H PRN    HYDROmorphone (DILAUDID) injection 0.5 mg  0.5 mg IntraVENous Q15MIN PRN    acetaminophen (TYLENOL) tablet 650 mg  650 mg Oral Q4H PRN    Or    acetaminophen (TYLENOL) solution 650 mg  650 mg Per NG tube Q4H PRN    Or    acetaminophen (TYLENOL) suppository 650 mg  650 mg Rectal Q4H PRN    ondansetron (ZOFRAN) injection 4 mg  4 mg IntraVENous Q6H PRN     ______________________________________________________________________  EXPECTED LENGTH OF STAY: 3d 4h  ACTUAL LENGTH OF STAY:          Jonh Benjamin MD

## 2022-05-02 NOTE — HOSPICE
[facility]  [phone]     Patient Name: Anisha Segundo  YOB: 1940  Age: 79 y.o. [address] RN Note:  Evaluated patient at bedside. He is alert and verbal, denies any pain. Eating small amounts of dysphagia diet. No symptoms to be managed. I have reached out to Dr Barbie Arnold to see if he thinks he should have PT/OT evaluation while at [facility] for possible rehab. If not candidate could benefit from outpatient hospice at home or in a facility. I will reach out to daughter, Oscar Viera today and meet with other daughter when she arrives at 11am.  CM made aware that patient does not meet GIP criteria and will need d/c plan  Thank you for the opportunity to be of service to this patient. Addendum:  Was able to reach daughter, Oscar Viera, by phone and had conference call with her, myself, and NIMISHA Youngblood. Oscar Viera is open to PT/OT evaluation today and if it looks like he could benefit from rehab she is open to him going skilled to a facility for rehab and transition to hospice if he declines further. If not candidate for rehab after their evaluation Oscar Viera  would like facility placement with hospice care and is aware they would pay room and board and hospice would be covered by Medicare. She does not want to restart oral meds until talking to her  who is a physician. Agrees with no  Labs and comfort meds will be changed from IV to SL.      Brady Tamayo RN  Clinical Nurse Liaison   [address]  X)[phone] (V) [phone]

## 2022-05-02 NOTE — PROGRESS NOTES
Patient is admitted to [facility] on April 26,2022 requiring acute medial care.  He still remains in the hospital.      Marie [facility]

## 2022-05-03 PROCEDURE — 97530 THERAPEUTIC ACTIVITIES: CPT | Performed by: PHYSICAL THERAPIST

## 2022-05-03 PROCEDURE — 65270000029 HC RM PRIVATE

## 2022-05-03 PROCEDURE — 99233 SBSQ HOSP IP/OBS HIGH 50: CPT | Performed by: PHYSICAL MEDICINE & REHABILITATION

## 2022-05-03 PROCEDURE — 97164 PT RE-EVAL EST PLAN CARE: CPT | Performed by: PHYSICAL THERAPIST

## 2022-05-03 PROCEDURE — 97166 OT EVAL MOD COMPLEX 45 MIN: CPT

## 2022-05-03 PROCEDURE — 74011250637 HC RX REV CODE- 250/637: Performed by: INTERNAL MEDICINE

## 2022-05-03 PROCEDURE — 97112 NEUROMUSCULAR REEDUCATION: CPT

## 2022-05-03 PROCEDURE — 92610 EVALUATE SWALLOWING FUNCTION: CPT

## 2022-05-03 RX ORDER — ATORVASTATIN CALCIUM 10 MG/1
10 TABLET, FILM COATED ORAL DAILY
Status: DISCONTINUED | OUTPATIENT
Start: 2022-05-04 | End: 2022-05-07 | Stop reason: HOSPADM

## 2022-05-03 RX ORDER — CLOPIDOGREL BISULFATE 75 MG/1
75 TABLET ORAL DAILY
Status: DISCONTINUED | OUTPATIENT
Start: 2022-05-04 | End: 2022-05-07 | Stop reason: HOSPADM

## 2022-05-03 RX ORDER — POTASSIUM CHLORIDE 750 MG/1
40 TABLET, FILM COATED, EXTENDED RELEASE ORAL
Status: COMPLETED | OUTPATIENT
Start: 2022-05-03 | End: 2022-05-03

## 2022-05-03 RX ORDER — AMLODIPINE BESYLATE 5 MG/1
10 TABLET ORAL DAILY
Status: DISCONTINUED | OUTPATIENT
Start: 2022-05-04 | End: 2022-05-07 | Stop reason: HOSPADM

## 2022-05-03 RX ORDER — GUAIFENESIN 100 MG/5ML
81 LIQUID (ML) ORAL DAILY
Status: DISCONTINUED | OUTPATIENT
Start: 2022-05-04 | End: 2022-05-07 | Stop reason: HOSPADM

## 2022-05-03 RX ADMIN — POTASSIUM CHLORIDE 40 MEQ: 750 TABLET, EXTENDED RELEASE ORAL at 14:34

## 2022-05-03 NOTE — PROGRESS NOTES
Problem: Self Care Deficits Care Plan (Adult)  Goal: *Acute Goals and Plan of Care (Insert Text)  Description: FUNCTIONAL STATUS PRIOR TO ADMISSION: Patient was independent and active without use of DME per chart review, patient unable to provide background 2/2 aphasia. HOME SUPPORT: The patient lived with wife but did not require assist.    Occupational Therapy Goals  Re-evaluation 5/3/2022 - Pt with improvement in functional status, removal from comfort care/hospice services  1. Patient will perform grooming with supervision/set-up within 7 day(s). 2.  Patient will perform upper body dressing with minimal assistance within 7 day(s). 3.  Patient will perform lower body dressing with moderate assistance  within 7 day(s). 4.  Patient will perform toilet transfers with minimal assistance/contact guard assist within 7 day(s). 5.  Patient will perform all aspects of toileting with moderate assistance within 7 day(s). 6.  Patient will follow simple 2 step commands with 50% accuracy with moderate verbal cueing in 7 day(s). 7.  Patient will utilize RUE during functional activities with moderate tactile and visual cues  cues within 7 day(s). Initiated 4/27/2022   1. Patient will perform 2 simple grooming tasks in supported sitting with moderate assistance within 7 day(s). 2.  Patient will perform anterior neck to thigh bathing in supported sitting with moderate assistance within 7 day(s). 3.  Patient will tolerate sitting EOB for 5 minutes in prep for ADLs and OOB mobility with mod A within 7 days. 4.  Patient will perform toilet transfers to/from [facility] with moderate assistance x2 within 7 day(s). 5.  Patient will participate in upper extremity therapeutic exercise/activities with moderate assistance within 7 day(s). 6.  Patient will participate in the [place] in prep for ADLs within 7 days.   Outcome: Progressing Towards Goal    OCCUPATIONAL THERAPY RE-EVALUATION  Patient: Basilio Regalado (52 y.o. male)  Date: 5/3/2022  Primary Diagnosis: [facility] [I63.9]        Precautions:   Fall (-170)    ASSESSMENT  Based on the objective data described below, the patient presents with decreased ADL related performance and mobility, R inattention, decreased RUE functional use (coordination, apraxia, strength, AROM), decreased balance, impaired command following (slowed, repetitive cues), decreased mentation (inattention, decreased processing speed, decreased ability to verbalize) and decreased activity tolerance. Participated well in evaluation demonstrating alertness, orientation to self, location, reason for admission. Able to follow simple one step commands with increased time and verbal/tactile cuing. Performed rolling in bed with min A. Total A to perform vale-hygiene s/p BM. Pt was able to verbalize he had BM. Performed transfer to EOB with min/mod A and increased time. Able to sit unsupported with cues. Stood x 2 with min A x 2, verbal and tactile cuing for hand placement on walker. Mod A to maintain placement. Transferred to chair with min to mod A x 2. Patient appears to have a significant improvement over last several days and now believe he would be an excellent candidate for IPR to optimize functional recovery. Pt is motivated and eager to engage. Anticipate continued progress during hospital stay with OT services. Current Level of Function Impacting Discharge (ADLs/self-care): up to max A for aspects of LB self-care, ADLs, decreased command following and attention    Functional Outcome Measure: The patient scored  15/100 on Barthel Index - recommend completion of Crys Benitez During next session    Other factors to consider for discharge: supportive family, PLOF, greatly improved functional status     Patient will benefit from skilled therapy intervention to address the above noted impairments.        PLAN :  Recommendations and Planned Interventions: self care training, functional mobility training, therapeutic exercise, balance training, visual/perceptual training, therapeutic activities, cognitive retraining, endurance activities, neuromuscular re-education, patient education, home safety training, and family training/education    Frequency/Duration: Patient will be followed by occupational therapy 5 times a week to address goals. Recommendation for discharge: (in order for the patient to meet his/her long term goals)  Therapy 3 hours per day 5-7 days per week    This discharge recommendation:  Has been made in collaboration with the attending provider and/or case management    IF patient discharges home will need the following DME: TBD pending progress/rehab       SUBJECTIVE:   Patient stated I was delighted to work with you. 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    Hypercholesterolemia     Hypertension     Personal history of kidney stones 1968    Prostatitis 2001    Psychiatric disorder     CLAUSTROPHOBIA; MASK OVER FACE WOULD CAUSE ANXIETY; HAS REACTED TO TIGHT SPACE (UNDER HOUSE)    Unspecified adverse effect of anesthesia     mother difficult to arouse post anesthesia     Past Surgical History:   Procedure Laterality Date    HX COLONOSCOPY      HX HERNIA REPAIR  3497    UMBILICAL    HX ORTHOPAEDIC Right 2000    ORIF ANKLE WITH PINS    HX ORTHOPAEDIC Right 2017    FOOT SURGERY    HX ORTHOPAEDIC Left 1980s    FOOT SURGERY     Expanded or extensive additional review of patient history:     Home Situation  Support Systems: Child(kai) (Daughter Sudhakar Rodriguez [phone])  Patient Expects to be Discharged to[de-identified] Rehab facility    Hand dominance: Right    EXAMINATION OF PERFORMANCE DEFICITS:  Cognitive/Behavioral Status:  Neurologic State: Alert  Orientation Level: Oriented to person;Oriented to situation;Oriented to place  Cognition: Decreased command following;Decreased attention/concentration;Recognition of people/places             Skin: see nursing    Edema: non observed    Hearing:       Vision/Perceptual:     R inattention  Wears corrective lenses                              Range of Motion:    AROM:  (WFL - LUE, RUE generally decreased, limited functionality)                         Strength:    Strength:  (LUE - WFL, RUE - generally decreased, limited function)                Coordination:  Coordination:  (LUE - WFL, RUE - impaired)  Fine Motor Skills-Upper: Left Intact; Right Impaired    Gross Motor Skills-Upper: Left Intact; Right Impaired    Tone & Sensation:  Impaired R hand for vision occluded sensation                          Balance:  Sitting: With support; Impaired  Sitting - Static: Good (unsupported)  Sitting - Dynamic: Fair (occasional)  Standing: Impaired  Standing - Static: Constant support; Fair  Standing - Dynamic : Fair;Constant support    Functional Mobility and Transfers for ADLs:  Bed Mobility:   Supine to sit: Minimum assistance; Moderate assistance;Assist x2    Transfers:  Sit to Stand: Minimum assistance; Moderate assistance;Assist x2  Stand to Sit: Minimum assistance;Assist x2  Bed to Chair: Moderate assistance;Minimum assistance;Assist x2    ADL Assessment:  Feeding: Setup;Stand-by assistance    Oral Facial Hygiene/Grooming: Moderate assistance    Bathing: Maximum assistance    Upper Body Dressing: Maximum assistance    Lower Body Dressing: Maximum assistance    Toileting: Maximum assistance                ADL Intervention and task modifications:  Feeding  Feeding Assistance: Set-up  Container Management: Set-up  Cutting Food: Set-up  Food to Mouth: Set-up  Drink to Mouth: Set-up                        Lower Body Dressing Assistance  Socks: Maximum assistance    Toileting  Bladder Hygiene: Total assistance (dependent) (catheter)  Bowel Hygiene:  Total assistance (dependent)    Cognitive Retraining  Orientation Retraining: Time  Following Commands: Follows two step commands/directions    Therapeutic Exercise:     Functional Measure:    Barthel Index:  Bathin  Bladder: 0  Bowels: 0  Groomin  Dressin  Feedin  Mobility: 5  Stairs: 0  Toilet Use: 0  Transfer (Bed to Chair and Back): 5  Total: 15/100      The Barthel ADL Index: Guidelines  1. The index should be used as a record of what a patient does, not as a record of what a patient could do. 2. The main aim is to establish degree of independence from any help, physical or verbal, however minor and for whatever reason. 3. The need for supervision renders the patient not independent. 4. A patient's performance should be established using the best available evidence. Asking the patient, friends/relatives and nurses are the usual sources, but direct observation and common sense are also important. However direct testing is not needed. 5. Usually the patient's performance over the preceding 24-48 hours is important, but occasionally longer periods will be relevant. 6. Middle categories imply that the patient supplies over 50 per cent of the effort. 7. Use of aids to be independent is allowed. Score Interpretation (from [address] [address])    Independent   60-79 Minimally independent   40-59 Partially dependent   20-39 Very dependent   <20 Totally dependent     -Libia Gibbons., Barthel, D.W. (1965). Functional evaluation: the Barthel Index. [address] [facility] ([address] (1997). The Barthel activities of daily living index: self-reporting versus actual performance in the old (> or = 75 years). Journal of [address] [place] [address] 45(7), 14 [facility], Kathy Spencer. (1999). Measuring the change in disability after inpatient rehabilitation; comparison of the responsiveness of the Barthel Index and Functional [place] Measure.  Journal of Neurology, Neurosurgery, and Psychiatry, 66(4), [phone]. SHORTY Saavedra, MOIZ Porras, & Jose Luis Irene M.A. (2004) Assessment of post-stroke quality of life in cost-effectiveness studies: The usefulness of the Barthel Index and the EuroQoL-5D. Quality of Life Research, 15, 397-24           Activity Tolerance:   Fair    After treatment patient left in no apparent distress:    Sitting in chair, Bed / chair alarm activated and Caregiver / family present, Call bell in reach    COMMUNICATION/EDUCATION:   The patients plan of care was discussed with: Physical therapist and Registered nurse. This patients plan of care is appropriate for delegation to [facility].     Thank you for this referral.  Aroldo Steele OT  Time Calculation: 29 mins

## 2022-05-03 NOTE — PROGRESS NOTES
Palliative Medicine Consult  Zeeshan: [phone] ([phone])    Patient Name: Shalini Juárez  YOB: 1940    Date of Initial Consult: 4/28/22  Reason for Consult: Care decisions   Requesting Provider: Jose   Primary Care Physician: Paola Alonso MD     SUMMARY:   Shalini Juárez is a 79 y.o. with a past history of HTN, hypercholesterolemia, osteoarthritis who was admitted on 4/26/2022 from home via EMS after experiencing R sided weakness and aphasia. Given TPA due to concern for stroke. Found to have thrombus causing severe stenosis of L ICA. Neuro IS consulted. Recommended eval for carotid endarterectomy, no endovascular intervention. MRI brain showing numerous acute/subacute nonhemorrhagic ischemia throughout L MCA territory. Able to work w/ therapy 4/27 (poor command following) and placed on puree diet w/ thin liquids but more lethargic today, not very arousable so changed from Plavix and ASA to heparin ggt. Comfort measures only 4/28.  5/2- Pt awake and alert x 3  5/3- Pt awake, able to answer simple questions, interactive- alert only to person, family and city today. Current medical issues leading to Palliative Medicine involvement include: care decisions. Social: Pt  x 51 years to Va Moon (who was hospice volunteer in past)- Va Moon has moderate dementia. They have 2 daughters- Javed Castaneda (in [place], is a OB/Gyn NP, her  is an ED physician) and Aakashpatricia Dominguez ([place], [place]). Pt worked w/ [id number], many degrees incl in [address] [place] [address] and [place]. He would often travel to [place] to help Javed Castaneda in her garden. 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 *mPOA is dtr Javed Castaneda. PALLIATIVE DIAGNOSES:   1. L MCA infarcts, severe L ICA stenosis  2. Significant improvement 5/2/22. 3. Goals of care        PLAN:   1.  Today pt continues to be awake, alert and able to to give input into medical situation (knows that he had a stroke, is in [place] , know that friends visited him yesterday) but I do not feel has full capacity to make medical decisions alone. However he is agreeable to working w/ therapies and taking pills. He has told his dtr recently that he would not want a PEG, would not want surgical intervention for his carotid stenosis - but I think a middle ground would be to start medications to prevent another stroke (although very high risk for another given significant stenosis of L ICA) and to work w/ therapies to see how much he can improve is reasonable. 2. Speak to dtr/mPOA Leticia Aguilar who is trying to do the best she can to honor patient's wishes and asks me if I am sure that pt will be able to have a good quality of life. Never know things for sure and certainly pt can change at any time- but from what I saw yesterday and what I see today I think he can have a fair quality of life. Recognized his wife and friends yesterday and interacted w/ them, can take bites of food (do need to see how much he wants to eat) and based on what I have heard (sitting on the porch drinking his bourbon,interacting w/ friends and family)- I think he can achieve this. I don't think he will be as mentally sharp as he was- but we don't know for sure, as he has been in the hospital, could have delirium that could clear some. 3. If pt does not want to work w/ therapy when they get there, if is not taking in much po, refuses medications that we start- I think pt would be leaning more to hospice in the future (although at this point I do not think meets criteria even). If the opposite occurs, I think rehab at a SNF would be appropriate to see if he could live in an long term versus home w/ caretakers to try and live as well as he can. If at anytime pt and family do not feel that quality of life is being met, can change goals. 4. Plan: will as Dr Mya Christopher to start/restart appropriate medications (antiHTN, ASA, Plavix) to see if pt will tolerate as pt has said he is okay w/ this. 5. Cont diet- may even see if can upgrade. Will ask SLP to re-evaluate (discussed w/ them yesterday) and PT/OT to eval.   6. Keeping \"comfort measures\" only order in- just in case pt has an acute event- family would NOT want escalation off the floor. 7. Following closely w/ you. 8. Communicated plan of care with: Palliative IDTN[facility] Team incl [place] and [place] w/ hospice, Dr Shantelle Morales SLP, Jony Finch care manager, Johnny Castleman, Sarah PT     GOALS OF CARE / TREATMENT PREFERENCES:     GOALS OF CARE:  Patient/Health Care Proxy Stated Goals: Other (comment) (To evaluate daily)    TREATMENT PREFERENCES:   Code Status: DNR    Patient and family's personal goals include: to die w/ comfort       Advance Care Planning:  [x] The [facility] Interdisciplinary Team has updated the ACP Navigator with [address] and Patient Capacity      Primary Decision Maker: Gerhard Plasencia - Daughter - [phone]  Advance Care Planning 2/13/2018   Patient's Healthcare Decision Maker is: Legal Next of Mckayla 68   Primary Decision Maker Name -   Primary Decision Maker Phone Number -   Confirm Advance Directive Yes, not on file       Medical Interventions: Limited additional interventions       Other:    As far as possible, the palliative care team has discussed with patient / health care proxy about goals of care / treatment preferences for patient. HISTORY:     History obtained from: Chart, staff, family , pt     CHIEF COMPLAINT: \"I had a stroke\"      HPI/SUBJECTIVE:    The patient is:   [x] Verbal and participatory  [] Non-participatory due to: medical situation     Pt awake, sitting up in bed, appropriate affect, denies pain or shortness of breath. He is moving UE and LE b/l. Need to see what percentage of meals he will eat.      Clinical Pain Assessment (nonverbal scale for severity on nonverbal patients):   Clinical Pain Assessment  Severity: 0     Activity (Movement): Lying quietly, normal position    Duration: for how long has pt been experiencing pain (e.g., 2 days, 1 month, years)  Frequency: how often pain is an issue (e.g., several times per day, once every few days, constant)     FUNCTIONAL ASSESSMENT:     Palliative Performance Scale (PPS):  PPS: 50       PSYCHOSOCIAL/SPIRITUAL SCREENING:     Palliative IDT has assessed this patient for cultural preferences / practices and a referral made as appropriate to needs (Cultural Services, Patient Advocacy, Ethics, etc.)    Any spiritual / Buddhism concerns:  [] Yes /  [x] No   If \"Yes\" to discuss with pastoral care during IDT     Does caregiver feel burdened by caring for their loved one:   [] Yes /  [x] No /  [] No Caregiver Present    If \"Yes\" to discuss with social work during IDT    Anticipatory grief assessment:   [x] Normal  / [] Maladaptive     If \"Maladaptive\" to discuss with social work during IDT    ESAS Anxiety: Anxiety: 0    ESAS Depression: Depression: 0        REVIEW OF SYSTEMS:     Positive and pertinent negative findings in ROS are noted above in HPI. The following systems were [x] reviewed / [] unable to be reviewed as noted in HPI  Other findings are noted below. Systems: constitutional, ears/nose/mouth/throat, respiratory, gastrointestinal, genitourinary, musculoskeletal, integumentary, neurologic, psychiatric, endocrine. Positive findings noted below. Modified ESAS Completed by: provider   Fatigue: 5 Drowsiness: 0   Depression: 0 Pain: 0   Anxiety: 0 Nausea: 0   Anorexia: 5 Dyspnea: 0                    PHYSICAL EXAM:     From RN flowsheet:  Wt Readings from Last 3 Encounters:   04/29/22 156 lb 8.4 oz (71 kg)   01/01/22 165 lb (74.8 kg)   02/13/18 162 lb (73.5 kg)     Blood pressure (!) 154/75, pulse (!) 54, temperature 98.2 °F (36.8 °C), resp. rate 16, height 5' 6\" (1.676 m), weight 156 lb 8.4 oz (71 kg), SpO2 95 %.     Pain Scale 1: Numeric (0 - 10)  Pain Intensity 1: 0                 Last bowel movement, if known:     Constitutional: awake, alert to person, family, city, situation somewhat ( I had a stroke) but not to year or hospital   HEENT: Nares patent, moist MM   Respiratory: breathing not labored  Musculoskeletal: no deformity, no tenderness to palpation  Skin: warm, dry  Neurologic: following commands and moving all extremities      HISTORY:     Principal Problem:    CVA (cerebral vascular accident) ([address] (4/26/2022)      Past Medical History:   Diagnosis Date    Ankle fracture 3/2000    Right    Arthritis     Cancer (HCC)     SCCA    Hypercholesterolemia     Hypertension     Personal history of kidney stones 1968    Prostatitis 2001    Psychiatric disorder     CLAUSTROPHOBIA; MASK OVER FACE WOULD CAUSE ANXIETY; HAS REACTED TO TIGHT SPACE (UNDER HOUSE)    Unspecified adverse effect of anesthesia     mother difficult to arouse post anesthesia      Past Surgical History:   Procedure Laterality Date    HX COLONOSCOPY      HX HERNIA REPAIR  9561    UMBILICAL    HX ORTHOPAEDIC Right 2000    ORIF ANKLE WITH PINS    HX ORTHOPAEDIC Right 2017    FOOT SURGERY    HX ORTHOPAEDIC Left 1980s    FOOT SURGERY      Family History   Problem Relation Age of Onset    Dementia Mother     Anesth Problems Mother         DIFFICULT TO AROUSE; DID NOT INVOLVE LENGTHY VENTILATOR USE.  Emphysema Father       History reviewed, no pertinent family history. Social History     Tobacco Use    Smoking status: Never Smoker    Smokeless tobacco: Never Used   Substance Use Topics    Alcohol use:  Yes     Alcohol/week: 3.3 standard drinks     Types: 4 Glasses of wine per week     Allergies   Allergen Reactions    Ace Inhibitors Cough    Percocet [Oxycodone-Acetaminophen] Nausea and Vomiting     \"I DON'T TOLERATE THE STRONG OPIODS\"      Current Facility-Administered Medications   Medication Dose Route Frequency    [START ON 5/4/2022] clopidogreL (PLAVIX) tablet 75 mg  75 mg Oral DAILY    [START ON 5/4/2022] aspirin chewable tablet 81 mg  81 mg Oral DAILY    [START ON 5/4/2022] atorvastatin (LIPITOR) tablet 10 mg  10 mg Oral DAILY    [START ON 5/4/2022] amLODIPine (NORVASC) tablet 10 mg  10 mg Oral DAILY    morphine (ROXANOL) 100 mg/5 mL (20 mg/mL) concentrated solution 10 mg  10 mg Oral Q3H PRN    LORazepam (INTENSOL) 2 mg/mL oral concentrate 1 mg  1 mg Oral Q4H PRN    glycopyrrolate (ROBINUL) injection 0.2 mg  0.2 mg IntraVENous Q4H PRN    scopolamine (TRANSDERM-SCOP) 1 mg over 3 days 1 Patch  1 Patch TransDERmal Q72H PRN    acetaminophen (TYLENOL) tablet 650 mg  650 mg Oral Q4H PRN    Or    acetaminophen (TYLENOL) solution 650 mg  650 mg Per NG tube Q4H PRN    Or    acetaminophen (TYLENOL) suppository 650 mg  650 mg Rectal Q4H PRN    ondansetron (ZOFRAN) injection 4 mg  4 mg IntraVENous Q6H PRN          LAB AND IMAGING FINDINGS:     Lab Results   Component Value Date/Time    WBC 9.3 04/28/2022 06:13 AM    HGB 14.2 04/28/2022 06:13 AM    PLATELET 700 (L) 73/37/3769 06:13 AM     Lab Results   Component Value Date/Time    Sodium 141 04/28/2022 06:20 AM    Potassium 3.0 (L) 04/28/2022 06:20 AM    Chloride 106 04/28/2022 06:20 AM    CO2 27 04/28/2022 06:20 AM    BUN 14 04/28/2022 06:20 AM    Creatinine 1.02 04/28/2022 06:20 AM    Calcium 9.2 04/28/2022 06:20 AM    Magnesium 2.1 04/27/2022 04:28 AM    Phosphorus 2.9 04/27/2022 04:28 AM      Lab Results   Component Value Date/Time    Alk.  phosphatase 104 04/26/2022 05:24 PM    Protein, total 7.6 04/26/2022 05:24 PM    Albumin 4.4 04/26/2022 05:24 PM    Globulin 3.2 04/26/2022 05:24 PM     Lab Results   Component Value Date/Time    INR 1.1 04/26/2022 05:24 PM    Prothrombin time 10.5 04/26/2022 05:24 PM    aPTT 25.2 04/28/2022 10:01 AM      No results found for: IRON, FE, TIBC, IBCT, PSAT, FERR   No results found for: PH, PCO2, PO2  No components found for: [place]   Lab Results   Component Value Date/Time    CK 74 08/07/2009 11:21 AM                Total time:45 min   Counseling / coordination time, spent as noted above: 35min  > 50% counseling / coordination?: yes    Prolonged service was provided for  []30 min   []75 min in face to face time in the presence of the patient, spent as noted above. Time Start:   Time End:   Note: this can only be billed with 39724 (initial) or 85273 (follow up). If multiple start / stop times, list each separately.

## 2022-05-03 NOTE — PROGRESS NOTES
[facility] Adult  Hospitalist Group                                                                                          Hospitalist Progress Note  Dara Kan MD  Answering service: [phone] or [phone] from in house phone        Date of Service:  5/3/2022  NAME:  Julia Miles  :  1940  MRN:  [id number]      Admission Summary:   Copied form admit: \" Estuardo Walsh a 79 y. o. with a past history of HTN, hypercholesterolemia, osteoarthritis who was admitted on 2022 from home via EMS after experiencing R sided weakness and aphasia. Given TPA due to concern for stroke. And then admitted to ICU after tpa.   Found to have thrombus causing severe stenosis of L ICA. Neuro IS consulted. Recommended vascular surgen eval for carotid endarterectomy, no endovascular intervention. MRI brain showing numerous acute/subacute nonhemorrhagic ischemia throughout L MCA territory. Able to work w/ therapy  (poor command following) and placed on puree diet w/ thin liquids but more lethargic today, not very arousable so changed from Plavix and ASA to heparin ggt. Palliative consulted, and family members decide  transition to comfort care \"    Interval history / Subjective:      :    In concert with paliatiave med, and family by pal med thereof, decision is to start basic meds in setting or acute cva, see MAR   Cont as of now SNF bound    Else:        Assessment & Plan:     Brief HPI and Hospital Course:         Assessment and Plan:     1. Acute.  L MCA infarcts : s/p tpa   Brain MRI: Numerous foci of acute or subacute nonhemorrhagic ischemia throughout the  left middle cerebral artery territory and left basal ganglia regions  -  Now comfort measures only - but slight changes as 5/3 comes into focus on pt's cont improvement   5/3:   In concert with paliatiave med, and family by pal med thereof, decision is to start basic meds in setting or acute cva, see MAR   Cont as of now SNF bound     2. 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 AMS/Acute encephalopathy due to cva  - resolviong     4. Aphasia/dysphagia   - neymar liq and will occ eat puree as of am rounds 5/3/2022   - diet advanced to easy chew 5/3     5. Hypokalemia   Lab Results   Component Value Date/Time    Potassium 3.0 (L) 04/28/2022 06:20 AM    prelace liquid  As comfort measurs only this will not be done and cont to seek hospice options   - tabs ordered for replacement 5/3      Comfort care only orders in place   Hospice consult may consider inpt hospice   No feeding tube. No labs.           Hospital Problems  Never Reviewed          Codes Class Noted POA    * (Principal) CVA (cerebral vascular accident) [facility]) ICD-10-CM: I63.9  ICD-9-CM: 434.91  4/26/2022 Unknown                  After personally interviewing pt at bedside the following is noted on 5/3/2022 :    Review of Systems   Respiratory: Negative for cough and shortness of breath. Cardiovascular: Negative for chest pain. Gastrointestinal: Negative for abdominal pain, nausea and vomiting. I had a face to face encounter with patient on 5/3/2022 at bedside for the following physical exam:     PHYSICAL EXAM:    Visit Vitals  BP (!) 154/75 (BP 1 Location: Right upper arm, BP Patient Position: At rest)   Pulse (!) 54   Temp 98.2 °F (36.8 °C)   Resp 16   Ht 5' 6\" (1.676 m)   Wt 71 kg (156 lb 8.4 oz)   SpO2 95%   BMI 25.26 kg/m²          Physical Exam  Constitutional:       General: He is not in acute distress. Appearance: He is not ill-appearing, toxic-appearing or diaphoretic. HENT:      Head: Normocephalic and atraumatic. Left Ear: External ear normal.      Nose: Nose normal.      Mouth/Throat:      Mouth: Mucous membranes are moist.      Pharynx: Oropharynx is clear. Eyes:      Extraocular Movements: Extraocular movements intact.       Conjunctiva/sclera: Conjunctivae normal.      Pupils: Pupils are equal, round, and reactive to light. Cardiovascular:      Rate and Rhythm: Normal rate and regular rhythm. Pulses: Normal pulses. Heart sounds: Normal heart sounds. Abdominal:      General: Abdomen is flat. Bowel sounds are normal.      Palpations: Abdomen is soft. Musculoskeletal:         General: No swelling or deformity. Skin:     General: Skin is warm and dry. Neurological:      Comments: Rt upper ext paresis    Psychiatric:         Mood and Affect: Mood normal.         Behavior: Behavior normal.                     Data Review:    Review and/or order of clinical lab test      Labs:     No results for input(s): WBC, HGB, HCT, PLT, HGBEXT, HCTEXT, PLTEXT, HGBEXT, HCTEXT, PLTEXT in the last 72 hours. No results for input(s): NA, K, CL, CO2, BUN, CREA, GLU, CA, MG, PHOS, URICA in the last 72 hours. No results for input(s): ALT, AP, TBIL, TBILI, TP, ALB, GLOB, GGT, AML, LPSE in the last 72 hours. No lab exists for component: SGOT, GPT, AMYP, HLPSE  No results for input(s): INR, PTP, APTT, INREXT, INREXT in the last 72 hours. No results for input(s): FE, TIBC, PSAT, FERR in the last 72 hours. No results found for: FOL, RBCF   No results for input(s): PH, PCO2, PO2 in the last 72 hours. No results for input(s): CPK, CKNDX, TROIQ in the last 72 hours.     No lab exists for component: CPKMB  Lab Results   Component Value Date/Time    Cholesterol, total 187 04/27/2022 04:28 AM    HDL Cholesterol 66 04/27/2022 04:28 AM    LDL, calculated 107.2 (H) 04/27/2022 04:28 AM    Triglyceride 69 04/27/2022 04:28 AM    CHOL/HDL Ratio 2.8 04/27/2022 04:28 AM     Lab Results   Component Value Date/Time    Glucose (POC) 97 04/26/2022 05:12 PM    Glucose (POC) 94 02/13/2018 07:20 AM     Lab Results   Component Value Date/Time    Color YELLOW/STRAW 01/29/2018 08:49 AM    Appearance CLEAR 01/29/2018 08:49 AM    Specific gravity 1.019 01/29/2018 08:49 AM    pH (UA) 6.0 01/29/2018 08:49 AM    Protein NEGATIVE  01/29/2018 08:49 AM    Glucose NEGATIVE  01/29/2018 08:49 AM    Ketone NEGATIVE  01/29/2018 08:49 AM    Bilirubin NEGATIVE  01/29/2018 08:49 AM    Urobilinogen 0.2 01/29/2018 08:49 AM    Nitrites NEGATIVE  01/29/2018 08:49 AM    Leukocyte Esterase NEGATIVE  01/29/2018 08:49 AM    Epithelial cells FEW 01/29/2018 08:49 AM    Bacteria NEGATIVE  01/29/2018 08:49 AM    WBC 0-4 01/29/2018 08:49 AM    RBC 0-5 01/29/2018 08:49 AM         Medications Reviewed:     Current Facility-Administered Medications   Medication Dose Route Frequency    [START ON 5/4/2022] clopidogreL (PLAVIX) tablet 75 mg  75 mg Oral DAILY    [START ON 5/4/2022] aspirin chewable tablet 81 mg  81 mg Oral DAILY    [START ON 5/4/2022] atorvastatin (LIPITOR) tablet 10 mg  10 mg Oral DAILY    [START ON 5/4/2022] amLODIPine (NORVASC) tablet 10 mg  10 mg Oral DAILY    morphine (ROXANOL) 100 mg/5 mL (20 mg/mL) concentrated solution 10 mg  10 mg Oral Q3H PRN    LORazepam (INTENSOL) 2 mg/mL oral concentrate 1 mg  1 mg Oral Q4H PRN    glycopyrrolate (ROBINUL) injection 0.2 mg  0.2 mg IntraVENous Q4H PRN    scopolamine (TRANSDERM-SCOP) 1 mg over 3 days 1 Patch  1 Patch TransDERmal Q72H PRN    acetaminophen (TYLENOL) tablet 650 mg  650 mg Oral Q4H PRN    Or    acetaminophen (TYLENOL) solution 650 mg  650 mg Per NG tube Q4H PRN    Or    acetaminophen (TYLENOL) suppository 650 mg  650 mg Rectal Q4H PRN    ondansetron (ZOFRAN) injection 4 mg  4 mg IntraVENous Q6H PRN     ______________________________________________________________________  EXPECTED LENGTH OF STAY: 3d 4h  ACTUAL LENGTH OF STAY:          7                 Lion Johnson MD

## 2022-05-03 NOTE — PROGRESS NOTES
SPEECH PATHOLOGY BEDSIDE SWALLOW EVALUATION/DISCHARGE  Patient: Wilner Denise (89 y.o. male)  Date: 5/3/2022  Primary Diagnosis: CVA (cerebral vascular accident) [facility]) [I63.9]       Precautions:   Fall (-170)    ASSESSMENT :  Based on the objective data described below, the patient presents with significant improvement since previous assessment by SLP. Pt now with completely functional swallow  on this date without any overt difficulty. Pt able to self-feed with minimal assistance. Pt is awake, alert, and communicative with PT/OT/SLP. Pt sitting on the edge of the bed to eat/drink. Recommend regular diet/thin liquids. Will benefit from rehab at next level of care given significant improvement in short period of time. Suspect further gains to be made. Given pt with functional swallow and communication, skilled acute therapy provided by a speech-language pathologist is not indicated at this time. Can address further higher level cognitive/communication deficits at next level of care     PLAN :  Recommendations:  -- regular diet/ thin liquids  -- general aspiration precautions including completely upright for all PO   -- SLP will sign off     Discharge Recommendations: Inpatient Rehab     SUBJECTIVE:   Patient stated, \"Well, I'm glad! after SLP stated that he looked significantly better.     OBJECTIVE:     Past Medical History:   Diagnosis Date    Ankle fracture 3/2000    Right    Arthritis     Cancer ([facility]     SCCA    Hypercholesterolemia     Hypertension     Personal history of kidney stones 1968    Prostatitis 2001    Psychiatric disorder     CLAUSTROPHOBIA; MASK OVER FACE WOULD CAUSE ANXIETY; HAS REACTED TO TIGHT SPACE (UNDER HOUSE)    Unspecified adverse effect of anesthesia     mother difficult to arouse post anesthesia     Past Surgical History:   Procedure Laterality Date    HX COLONOSCOPY      HX HERNIA REPAIR  9647    UMBILICAL    HX ORTHOPAEDIC Right 2000    ORIF ANKLE WITH PINS    HX ORTHOPAEDIC Right 2017    FOOT SURGERY    HX ORTHOPAEDIC Left 1980s    FOOT SURGERY     Prior Level of Function/Home Situation:   Home Situation  Support Systems: Child(kai) (Daughter Carlos Ziegler [phone])  Patient Expects to be Discharged to[de-identified] Skilled nursing facility  Diet prior to admission: regular diet/thin liquids  Current Diet:  Regular diet/thin liquids   Cognitive and Communication Status:  Neurologic State: Alert  Orientation Level: Oriented to person,Oriented to situation,Oriented to place  Cognition: Decreased command following,Decreased attention/concentration,Recognition of people/places  Perception: Cues to attend to right side of body,Cues to attend right visual field,Tactile,Verbal,Visual  Perseveration: No perseveration noted  Safety/Judgement: Decreased awareness of environment,Decreased awareness of need for assistance,Decreased awareness of need for safety,Decreased insight into deficits,Fall prevention  Oral Assessment:  WFL  P.O. Trials:  Seen with water and vivek crackers. No overt s/s of aspiration. No oral residue. No concerns. NOMS:   The NOMS functional outcome measure was used to quantify this patient's level of swallowing impairment. Based on the NOMS, the patient was determined to be at level 7 for swallow function     NOMS Swallowing Levels:  Level 1 (CN): NPO  Level 2 (CM): NPO but takes consistency in therapy  Level 3 (CL): Takes less than 50% of nutrition p.o. and continues with nonoral feedings; and/or safe with mod cues; and/or max diet restriction  Level 4 (CK): Safe swallow but needs mod cues; and/or mod diet restriction; and/or still requires some nonoral feeding/supplements  Level 5 (CJ): Safe swallow with min diet restriction; and/or needs min cues  Level 6 (CI): Independent with p.o.; rare cues; usually self cues; may need to avoid some foods or needs extra time  Level 7 ([address] [place] [address]): Independent for all p.o.  BERNARDO. (2003).  National Outcomes Measurement System (NOMS): Adult Speech-Language Pathology User's Guide. Pain:  Pain Scale 1: Numeric (0 - 10)  Pain Intensity 1: 0     After treatment:   Call bell within reach and Nursing notified    COMMUNICATION/EDUCATION:     The patient's plan of care including recommendations, planned interventions, and recommended diet changes were discussed with: Registered nurse.      Thank you for this referral.  RACHID Pineda  Time Calculation: 15 mins

## 2022-05-03 NOTE — PROGRESS NOTES
Problem: Mobility Impaired (Adult and Pediatric)  Goal: *Acute Goals and Plan of Care (Insert Text)  Description: FUNCTIONAL STATUS PRIOR TO ADMISSION: Pt aphasic, did not verbalize, unable to provide PLOF. Physical Therapy Goals  Revised 5/3/2022  1. Patient will move from supine to sit and sit to supine  in bed with minimal assistance within 7 day(s). 2.  Patient will transfer from bed to chair and chair to bed with minimal assistance using the least restrictive device within 7 day(s). 3.  Patient will perform sit to stand with minimal assistance within 7 day(s). 4.  Patient will ambulate with minimal assistance for 50 feet with the least restrictive device within 7 day(s). Physical Therapy Goals  Initiated 4/27/2022  1. Patient will move from supine to sit and sit to supine , scoot up and down, and roll side to side in bed with maximal assistance within 7 day(s). 2.  Patient will transfer from bed to chair and chair to bed with maximal assistance using the least restrictive device within 7 day(s). 3.  Patient will perform sit to stand with maximal assistance within 7 day(s). 4.  Patient will tolerate static sitting EOB x2 minutes with moderate assistance within 7 day(s). Outcome: Progressing Towards Goal   PHYSICAL THERAPY REEVALUATION  Patient: Shalini Juárez (55 y.o. male)  Date: 5/3/2022  Primary Diagnosis: CVA (cerebral vascular accident) [facility]) [I63.9]        Precautions:   Fall (-170)      ASSESSMENT  Based on the objective data described below, the patient presents with decreased functional mobility from baseline level of function. Patient currently limited by R sided weakness (R UE>LE), decreased coordination R UE, R sided inattention, impaired balance and gait instability with decreased activity tolerance. Currently alert and oriented to self, place and situation. Needing Eugene for rolling R<>L in the bed and able to use R UE to grab handrail.   Comes to EOB with Eugene-modA x 1 and able to sit unsupported on EOB. Sit stand with Eugene x 2 and cues to place R UE on the walker. Once in standing can take a few steps to the bedside chair with Eugene-modA x 2 with cues for technique and assist to keep R UE on RW. He has made significant improvement over the last few days and is very appropriate for IPR setting at this time. Recommend IPR to progress to more independent level of function with intense multidisciplinary intervention. Will continue to follow. Other factors to consider for discharge: at risk for falls, below baseline,      Patient will benefit from skilled therapy intervention to address the above noted impairments. PLAN :  Recommendations and Planned Interventions: bed mobility training, transfer training, gait training, therapeutic exercises, neuromuscular re-education, patient and family training/education, and therapeutic activities      Frequency/Duration: Patient will be followed by physical therapy:  5 times a week to address goals. Recommendation for discharge: (in order for the patient to meet his/her long term goals)  Therapy 3 hours per day 5-7 days per week. If unable to go to IPR then recommend SNF level rehab    Equipment recommendations for successful discharge (if) home: RW         SUBJECTIVE:   Patient stated You got that right.   (his response after stating he had been in bed for numerous days)    OBJECTIVE DATA SUMMARY:   HISTORY:    Past Medical History:   Diagnosis Date    Ankle fracture 3/2000    Right    Arthritis     Cancer ([facility] [address]     SCCA    Hypercholesterolemia     Hypertension     Personal history of kidney stones 1968    Prostatitis 2001    Psychiatric disorder     CLAUSTROPHOBIA; MASK OVER FACE WOULD CAUSE ANXIETY; HAS REACTED TO TIGHT SPACE (UNDER HOUSE)    Unspecified adverse effect of anesthesia     mother difficult to arouse post anesthesia     Past Surgical History:   Procedure Laterality Date    HX COLONOSCOPY      HX HERNIA REPAIR  [id number]    UMBILICAL    HX ORTHOPAEDIC Right 2000    ORIF ANKLE WITH PINS    HX ORTHOPAEDIC Right 2017    FOOT SURGERY    HX ORTHOPAEDIC Left [address] [employer] course since last seen and reason for reevaluation: seen for re-joon per MD request as patient was no longer on comfort care    Personal factors and/or comorbidities impacting plan of care:     Home Situation  Support Systems: Child(kai) (Daughter Jose Daniel Ledezma [phone])  Patient Expects to be Discharged to[de-identified] Skilled nursing facility    EXAMINATION/PRESENTATION/DECISION MAKING:   Critical Behavior:  Neurologic State: Alert  Orientation Level: Oriented to person,Oriented to situation,Oriented to place  Cognition: Decreased command following,Decreased attention/concentration,Recognition of people/places  Safety/Judgement: Decreased awareness of environment,Decreased awareness of need for assistance,Decreased awareness of need for safety,Decreased insight into deficits,Fall prevention    Range Of Motion:  AROM:  (WFL - LUE, RUE generally decreased, limited functionality)                       Strength:    Strength:  (LUE - WFL, RUE - generally decreased, limited function)              Coordination:  Coordination:  (LUE - WFL, RUE - impaired)  Vision:      Functional Mobility:  Bed Mobility:  Rolling: Minimum assistance  Supine to Sit: Moderate assistance;Assist x1     Scooting: Supervision  Transfers:  Sit to Stand: Minimum assistance; Moderate assistance;Assist x2  Stand to Sit: Minimum assistance;Assist x2        Bed to Chair: Moderate assistance;Minimum assistance;Assist x2              Balance:   Sitting: Impaired; With support  Sitting - Static: Good (unsupported)  Sitting - Dynamic: Fair (occasional)  Standing: Impaired  Standing - Static: Constant support; Fair  Standing - Dynamic : Fair;Constant support  Ambulation/Gait Training:  Distance (ft): 4 Feet (ft) (steps bed to chair)  Assistive Device: Gait belt;Walker, rolling  Ambulation - Level of Assistance: Minimal assistance; Moderate assistance;Assist x1     Gait Description (WDL): Exceptions to WDL  Gait Abnormalities: Decreased step clearance; Hemiplegic        Base of Support: Center of gravity altered;Narrowed     Speed/Louisa: Pace decreased (<100 feet/min); Slow  Step Length: Left shortened;Right shortened        Pain Rating:  No c/o pain    Activity Tolerance:   Fair and requires rest breaks    After treatment patient left in no apparent distress:   Sitting in chair, Call bell within reach, Bed / chair alarm activated, and Caregiver / family present    COMMUNICATION/EDUCATION:   The patients plan of care was discussed with: Physical therapist, Occupational therapist, and Registered nurse. Fall prevention education was provided and the patient/caregiver indicated understanding., Patient/family have participated as able in goal setting and plan of care. , and Patient/family agree to work toward stated goals and plan of care.     Thank you for this referral.  Luis Bhatti, PT, DPT   Time Calculation: 28 mins

## 2022-05-03 NOTE — PROGRESS NOTES
VIMAL:  1. RUR-11%  2. IPR referrals pending. 3. BLS vs family transport. CM noted of therapy notes, recommendation of IPR. CM spoke with attending MD and daughter, Estefania Chun. They are both in agreement of this plan. Referrals sent to [facility] and [facility]. CM to follow.     [facility]

## 2022-05-03 NOTE — PROGRESS NOTES
VIMAL:  1. RUR-11%  2. Dispo- rehab pending pt/ot notes. 3. Referral pending. NONI spoke with Nasir Torrez at [facility], [phone]. She requested for noni to email her the referral to her at [email or web address] NONI awaiting therapy notes, message sent through [address] [place] [address]. NONI still working to obtain a number for [facility].        Amparo Golden, [facility]

## 2022-05-04 ENCOUNTER — APPOINTMENT (OUTPATIENT)
Dept: VASCULAR SURGERY | Age: 82
DRG: 037 | End: 2022-05-04
Attending: SURGERY
Payer: MEDICARE

## 2022-05-04 LAB
LEFT CCA DIST DIAS: 10.3 CM/S
LEFT CCA DIST SYS: 43.4 CM/S
LEFT CCA PROX DIAS: 6.5 CM/S
LEFT CCA PROX SYS: 38.6 CM/S
LEFT ECA DIAS: 3.7 CM/S
LEFT ECA SYS: 54.7 CM/S
LEFT ICA DIST DIAS: 9.5 CM/S
LEFT ICA DIST SYS: 53.4 CM/S
LEFT ICA MID DIAS: 16.1 CM/S
LEFT ICA MID SYS: 155.5 CM/S
LEFT ICA PROX DIAS: 67.8 CM/S
LEFT ICA PROX SYS: 351.2 CM/S
LEFT ICA/CCA SYS: 8.1 NO UNITS
LEFT VERTEBRAL DIAS: 6.6 CM/S
LEFT VERTEBRAL SYS: 45.9 CM/S

## 2022-05-04 PROCEDURE — 74011250637 HC RX REV CODE- 250/637: Performed by: INTERNAL MEDICINE

## 2022-05-04 PROCEDURE — 97530 THERAPEUTIC ACTIVITIES: CPT

## 2022-05-04 PROCEDURE — 97116 GAIT TRAINING THERAPY: CPT

## 2022-05-04 PROCEDURE — 74011250636 HC RX REV CODE- 250/636: Performed by: INTERNAL MEDICINE

## 2022-05-04 PROCEDURE — 65270000029 HC RM PRIVATE

## 2022-05-04 PROCEDURE — 93882 EXTRACRANIAL UNI/LTD STUDY: CPT

## 2022-05-04 PROCEDURE — 99232 SBSQ HOSP IP/OBS MODERATE 35: CPT | Performed by: PHYSICAL MEDICINE & REHABILITATION

## 2022-05-04 RX ORDER — ENOXAPARIN SODIUM 100 MG/ML
40 INJECTION SUBCUTANEOUS EVERY 24 HOURS
Status: DISCONTINUED | OUTPATIENT
Start: 2022-05-04 | End: 2022-05-07 | Stop reason: HOSPADM

## 2022-05-04 RX ORDER — MINOXIDIL 2.5 MG/1
5 TABLET ORAL DAILY
Status: DISCONTINUED | OUTPATIENT
Start: 2022-05-04 | End: 2022-05-07 | Stop reason: HOSPADM

## 2022-05-04 RX ADMIN — ATORVASTATIN CALCIUM 10 MG: 10 TABLET, FILM COATED ORAL at 11:29

## 2022-05-04 RX ADMIN — ASPIRIN 81 MG CHEWABLE TABLET 81 MG: 81 TABLET CHEWABLE at 11:29

## 2022-05-04 RX ADMIN — MINOXIDIL 5 MG: 2.5 TABLET ORAL at 13:35

## 2022-05-04 RX ADMIN — ENOXAPARIN SODIUM 40 MG: 100 INJECTION SUBCUTANEOUS at 13:31

## 2022-05-04 RX ADMIN — AMLODIPINE BESYLATE 10 MG: 5 TABLET ORAL at 11:29

## 2022-05-04 RX ADMIN — CLOPIDOGREL BISULFATE 75 MG: 75 TABLET ORAL at 11:29

## 2022-05-04 NOTE — PROGRESS NOTES
Physician Progress Note      PATIENT:               Reginaldo Jj  [facility] #:                  [id number]  :                       1940  ADMIT DATE:       2022 5:09 PM  [address] [place] DATE:  RESPONDING  PROVIDER #:        Ej Orlando MD          QUERY TEXT:    Patient admitted with acute CVA. The ICU has documented TORIBIO, not specified as acute kidney injury or insufficiency. The pt's sCr was 1.36 on admission and has been 1.02-1.26 through 22. The pt is currently comfort measures only and no labs have been taken since. In order to support the diagnosis of TORIBIO, please include additional clinical indicators in your documentation. Or, please document if the diagnosis of OTRIBIO has been ruled out after further study. The medical record reflects the following:    Risk Factors: HTN    Clinical Indicators:  -- sCr = 1.36 on  at admission; sCr = 1.26, 1.02 thereafter through ; PTA sCr = 1.00 in 2018  -- ICU noted TORIBIO    Treatment: IVF @ 75 ml/hr -, serial lab monitoring of renal function prior to comfort measures    Thank-you,  Ramin Harman RN, [facility]  Clinical   [phone]  [email or web address]      Defined by Kidney Disease Improving Global Outcomes (KDIGO) clinical practice guideline for acute kidney injury:  -Increase in SCr by greater than or equal to 0.3 mg/dl within 48 hours; or  -Increase or decrease in SCr to greater than or equal to 1.5 times baseline, which is known or presumed to have occurred within the prior 7 days; or  -Urine volume < 0.5ml/kg/h for 6 hours  Options provided:  -- Acute kidney injury evidenced by, Please document evidence as well as baseline creatinine, if known.   -- Acute kidney insufficiency only, acute kidney injury ruled out after study  -- Acute kidney injury ruled out after study  -- Other - I will add my own diagnosis  -- Disagree - Not applicable / Not valid  -- Disagree - Clinically unable to determine / Unknown  -- Refer to Clinical Documentation Reviewer    PROVIDER RESPONSE TEXT:    Acute kidney insufficiency only, acute kidney injury was ruled out after study.     Query created by: Komal Simmons on 5/2/2022 8:09 AM      Electronically signed by:  Lyudmila Quinteros MD 5/4/2022 7:24 AM

## 2022-05-04 NOTE — PROGRESS NOTES
Problem: Aspiration - Risk of  Goal: *Absence of aspiration  Outcome: Progressing Towards Goal     Problem: Pressure Injury - Risk of  Goal: *Prevention of pressure injury  Description: Document Den Scale and appropriate interventions in the flowsheet. Outcome: Progressing Towards Goal  Note: Pressure Injury Interventions:  Sensory Interventions: Assess changes in LOC,Keep linens dry and wrinkle-free,Float heels    Moisture Interventions: Absorbent underpads,Check for incontinence Q2 hours and as needed    Activity Interventions: Pressure redistribution bed/mattress(bed type)    Mobility Interventions: Float heels,HOB 30 degrees or less    Nutrition Interventions: Document food/fluid/supplement intake    Friction and Shear Interventions: Lift sheet,HOB 30 degrees or less    Problem: Falls - Risk of  Goal: *Absence of Falls  Description: Document Jennifer Fall Risk and appropriate interventions in the flowsheet.   Outcome: Progressing Towards Goal  Note: Fall Risk Interventions:  Mobility Interventions: Communicate number of staff needed for ambulation/transfer    Mentation Interventions: Adequate sleep, hydration, pain control,Evaluate medications/consider consulting pharmacy    Medication Interventions: Evaluate medications/consider consulting pharmacy    Elimination Interventions: Call light in reach,Toileting schedule/hourly rounds

## 2022-05-04 NOTE — PROGRESS NOTES
Problem: Aspiration - Risk of  Goal: *Absence of aspiration  Outcome: Progressing Towards Goal     Problem: Patient Education: Go to Patient Education Activity  Goal: Patient/Family Education  Outcome: Progressing Towards Goal     Problem: Pressure Injury - Risk of  Goal: *Prevention of pressure injury  Description: Document Den Scale and appropriate interventions in the flowsheet. Outcome: Progressing Towards Goal  Note: Pressure Injury Interventions:  Sensory Interventions: Assess need for specialty bed,Assess changes in LOC,Float heels    Moisture Interventions: Apply protective barrier, creams and emollients,Absorbent underpads    Activity Interventions: Pressure redistribution bed/mattress(bed type),Increase time out of bed    Mobility Interventions: HOB 30 degrees or less,Float heels    Nutrition Interventions: Document food/fluid/supplement intake    Friction and Shear Interventions: Apply protective barrier, creams and emollients                Problem: Patient Education: Go to Patient Education Activity  Goal: Patient/Family Education  Outcome: Progressing Towards Goal     Problem: Falls - Risk of  Goal: *Absence of Falls  Description: Document Jennifer Fall Risk and appropriate interventions in the flowsheet.   Outcome: Progressing Towards Goal  Note: Fall Risk Interventions:  Mobility Interventions: Communicate number of staff needed for ambulation/transfer    Mentation Interventions: Adequate sleep, hydration, pain control,Bed/chair exit alarm    Medication Interventions: Evaluate medications/consider consulting pharmacy    Elimination Interventions: Call light in reach,Toileting schedule/hourly rounds              Problem: Patient Education: Go to Patient Education Activity  Goal: Patient/Family Education  Outcome: Progressing Towards Goal     Problem: Patient Education: Go to Patient Education Activity  Goal: Patient/Family Education  Outcome: Progressing Towards Goal     Problem: Patient Education: Go to Patient Education Activity  Goal: Patient/Family Education  Outcome: Progressing Towards Goal     Problem: Patient Education: Go to Patient Education Activity  Goal: Patient/Family Education  Outcome: Progressing Towards Goal     Problem: Patient Education: Go to Patient Education Activity  Goal: Patient/Family Education  Outcome: Progressing Towards Goal

## 2022-05-04 NOTE — PROGRESS NOTES
VIMAL:  1. RUR-11%  2. [facility](accepted) vs [facility](pending)- disposition  3. PT/OT following. 4. BLS transport. 5. Vascular surgery following- procedure on Friday. NONI spoke with his daughter, Oscar Viera to let her know that [facility] IPR accepted and [facility] SN[facility]. Oscar Viera would like to continue IPR plan, and is working on [facility] placement. NONI suggested [facility] referral, and she is agreeable. NONI contacted Jean Owusu with [employer], [phone] and emailed him a copy of the H and [email or web address]). Fatimah Cintrontripp is going to call his daughter today. 1217- CM participated in IDRs, patient is not medically stable for discharge and will go to OR for procedure on Friday. His daughter, Oscar Viera is aware of the plan. [facility] referral sent they will have beds next week if they can accept him. CM to follow.     Nathalia Parker, [facility]

## 2022-05-04 NOTE — PROGRESS NOTES
[facility] Adult  Hospitalist Group                                                                                          Hospitalist Progress Note  Remberto Dey MD  Answering service: [phone] OR [phone] from in house phone        Date of Service:  2022  NAME:  Matilde Gamble  :  1940  MRN:  [id number]      Admission Summary:   Copied form admit: \" Ramya Leiva a 79 y. o. with a past history of HTN, hypercholesterolemia, osteoarthritis who was admitted on 2022 from home via EMS after experiencing R sided weakness and aphasia. Given TPA due to concern for stroke. And then admitted to ICU after tpa.   Found to have thrombus causing severe stenosis of L ICA. Neuro IS consulted. Recommended vascular surgen eval for carotid endarterectomy, no endovascular intervention. MRI brain showing numerous acute/subacute nonhemorrhagic ischemia throughout L MCA territory. Able to work w/ therapy  (poor command following) and placed on puree diet w/ thin liquids but more lethargic today, not very arousable so changed from Plavix and ASA to heparin ggt. Palliative consulted, and family members decide  transition to comfort care \"    Interval history / Subjective:     2022 :    Post CVA/ICU w poor out look, now IPR bound   For CEA Friday if imaging suggest   Pt has turned form hospice as best option to independent living after IPR as best   BP management and risk meds on board- adjust as indicated on daily rounds        Assessment & Plan:           1. Acute.  L MCA infarcts : s/p tpa   Brain MRI: Numerous foci of acute or subacute nonhemorrhagic ischemia throughout the  left middle cerebral artery territory and left basal ganglia regions  -  Out of ICU: comfort measures only - but slight changes as /3 comes into focus on pt's cont improvement   5/3:   In concert with paliatiave med, and family by pal med thereof, decision is to start basic meds in setting or acute cva, see MAR   Cont as of now SNF bound   5/4  · To IPR if can arrange as progress remarkable over past day- days   · For CEA  Friday     2. severe > 95 stenosis of L ICA at origin by thrombus  - VASc surg / on Friday anticipated   - then to IPR in route to independent living is goal     3. AMS/Acute encephalopathy due to cva  - resolved    4. Aphasia/dysphagia   - neymar liq and will occ eat puree as of am rounds 5/4/2022   - diet advanced to easy chew 5/3     5. Hypokalemia   Lab Results   Component Value Date/Time    Potassium 3.0 (L) 04/28/2022 06:20 AM   - f/u lab as plans change from hospice to IPR as of 5/3- /5/4                Hospital Problems  Never Reviewed          Codes Class Noted POA    * (Principal) CVA (cerebral vascular accident) [facility]) ICD-10-CM: I63.9  ICD-9-CM: 434.91  4/26/2022 Unknown                  After personally interviewing pt at bedside the following is noted on 5/4/2022 :    Review of Systems   Respiratory: Negative for cough and shortness of breath. Cardiovascular: Negative for chest pain. Gastrointestinal: Negative for abdominal pain, nausea and vomiting. I had a face to face encounter with patient on 5/4/2022 at bedside for the following physical exam:     PHYSICAL EXAM:    Visit Vitals  BP (!) 192/67 (BP 1 Location: Left arm, BP Patient Position: At rest)   Pulse (!) 56   Temp 97.7 °F (36.5 °C)   Resp 18   Ht 5' 6\" (1.676 m)   Wt 71 kg (156 lb 8.4 oz)   SpO2 94%   BMI 25.26 kg/m²          Physical Exam  Constitutional:       General: He is not in acute distress. Appearance: He is not ill-appearing, toxic-appearing or diaphoretic. HENT:      Head: Normocephalic and atraumatic. Left Ear: External ear normal.      Nose: Nose normal.      Mouth/Throat:      Mouth: Mucous membranes are moist.      Pharynx: Oropharynx is clear. Eyes:      Extraocular Movements: Extraocular movements intact.       Conjunctiva/sclera: Conjunctivae normal.      Pupils: Pupils are equal, round, and reactive to light. Cardiovascular:      Rate and Rhythm: Normal rate and regular rhythm. Pulses: Normal pulses. Heart sounds: Normal heart sounds. Abdominal:      General: Abdomen is flat. Bowel sounds are normal.      Palpations: Abdomen is soft. Musculoskeletal:         General: No swelling or deformity. Skin:     General: Skin is warm and dry. Neurological:      Comments: Rt upper ext paresis    Psychiatric:         Mood and Affect: Mood normal.         Behavior: Behavior normal.                     Data Review:    Review and/or order of clinical lab test      Labs:     No results for input(s): WBC, HGB, HCT, PLT, HGBEXT, HCTEXT, PLTEXT, HGBEXT, HCTEXT, PLTEXT in the last 72 hours. No results for input(s): NA, K, CL, CO2, BUN, CREA, GLU, CA, MG, PHOS, URICA in the last 72 hours. No results for input(s): ALT, AP, TBIL, TBILI, TP, ALB, GLOB, GGT, AML, LPSE in the last 72 hours. No lab exists for component: SGOT, GPT, AMYP, HLPSE  No results for input(s): INR, PTP, APTT, INREXT, INREXT in the last 72 hours. No results for input(s): FE, TIBC, PSAT, FERR in the last 72 hours. No results found for: FOL, RBCF   No results for input(s): PH, PCO2, PO2 in the last 72 hours. No results for input(s): CPK, CKNDX, TROIQ in the last 72 hours.     No lab exists for component: CPKMB  Lab Results   Component Value Date/Time    Cholesterol, total 187 04/27/2022 04:28 AM    HDL Cholesterol 66 04/27/2022 04:28 AM    LDL, calculated 107.2 (H) 04/27/2022 04:28 AM    Triglyceride 69 04/27/2022 04:28 AM    CHOL/HDL Ratio 2.8 04/27/2022 04:28 AM     Lab Results   Component Value Date/Time    Glucose (POC) 97 04/26/2022 05:12 PM    Glucose (POC) 94 02/13/2018 07:20 AM     Lab Results   Component Value Date/Time    Color YELLOW/STRAW 01/29/2018 08:49 AM    Appearance CLEAR 01/29/2018 08:49 AM    Specific gravity 1.019 01/29/2018 08:49 AM    pH (UA) 6.0 01/29/2018 08:49 AM    Protein NEGATIVE 01/29/2018 08:49 AM    Glucose NEGATIVE  01/29/2018 08:49 AM    Ketone NEGATIVE  01/29/2018 08:49 AM    Bilirubin NEGATIVE  01/29/2018 08:49 AM    Urobilinogen 0.2 01/29/2018 08:49 AM    Nitrites NEGATIVE  01/29/2018 08:49 AM    Leukocyte Esterase NEGATIVE  01/29/2018 08:49 AM    Epithelial cells FEW 01/29/2018 08:49 AM    Bacteria NEGATIVE  01/29/2018 08:49 AM    WBC 0-4 01/29/2018 08:49 AM    RBC 0-5 01/29/2018 08:49 AM         Medications Reviewed:     Current Facility-Administered Medications   Medication Dose Route Frequency    minoxidiL (LONITEN) tablet 5 mg  5 mg Oral DAILY    clopidogreL (PLAVIX) tablet 75 mg  75 mg Oral DAILY    aspirin chewable tablet 81 mg  81 mg Oral DAILY    atorvastatin (LIPITOR) tablet 10 mg  10 mg Oral DAILY    amLODIPine (NORVASC) tablet 10 mg  10 mg Oral DAILY    morphine (ROXANOL) 100 mg/5 mL (20 mg/mL) concentrated solution 10 mg  10 mg Oral Q3H PRN    LORazepam (INTENSOL) 2 mg/mL oral concentrate 1 mg  1 mg Oral Q4H PRN    glycopyrrolate (ROBINUL) injection 0.2 mg  0.2 mg IntraVENous Q4H PRN    scopolamine (TRANSDERM-SCOP) 1 mg over 3 days 1 Patch  1 Patch TransDERmal Q72H PRN    acetaminophen (TYLENOL) tablet 650 mg  650 mg Oral Q4H PRN    Or    acetaminophen (TYLENOL) solution 650 mg  650 mg Per NG tube Q4H PRN    Or    acetaminophen (TYLENOL) suppository 650 mg  650 mg Rectal Q4H PRN    ondansetron (ZOFRAN) injection 4 mg  4 mg IntraVENous Q6H PRN     ______________________________________________________________________  EXPECTED LENGTH OF STAY: 3d 4h  ACTUAL LENGTH OF STAY:          8                 Jl Narayan MD

## 2022-05-04 NOTE — CONSULTS
Consult    Patient: Gray Bloch MRN: [id number]  SSN: [id number]    YOB: 1940  Age: 79 y.o. Sex: male      Subjective: Gray Bloch is a 79 y.o. male who is being seen for evaluation of symptomatic L ICA stenosis. The patient presented with a significant L sided CVA. I initially attempted to visit with him on 4/28. At that time he was non communicative, was seemingly not expected to make a significant neurological recovery and had just been transitioned to [address] [place] [address] Vascular surgery now re-consulted. The patient had made a significant improvement and is now alert, oriented, communicative and desires to proceed with surgery. At time of initial visit the patient had a CTA showing near complete occlusion of his left ICA. A subsequent carotid duplex showed decreased clot burden. He did receive systemic TPA at time of initial presentation. Past Medical History:   Diagnosis Date    Ankle fracture 3/2000    Right    Arthritis     Cancer (HCC)     SCCA    Hypercholesterolemia     Hypertension     Personal history of kidney stones 1968    Prostatitis 2001    Psychiatric disorder     CLAUSTROPHOBIA; MASK OVER FACE WOULD CAUSE ANXIETY; HAS REACTED TO TIGHT SPACE (UNDER HOUSE)    Unspecified adverse effect of anesthesia     mother difficult to arouse post anesthesia     Past Surgical History:   Procedure Laterality Date    HX COLONOSCOPY      HX HERNIA REPAIR  9949    UMBILICAL    HX ORTHOPAEDIC Right 2000    ORIF ANKLE WITH PINS    HX ORTHOPAEDIC Right 2017    FOOT SURGERY    HX ORTHOPAEDIC Left 1980s    FOOT SURGERY      Family History   Problem Relation Age of Onset    Dementia Mother     Anesth Problems Mother         DIFFICULT TO AROUSE; DID NOT INVOLVE LENGTHY VENTILATOR USE.  Emphysema Father      Social History     Tobacco Use    Smoking status: Never Smoker    Smokeless tobacco: Never Used   Substance Use Topics    Alcohol use:  Yes     Alcohol/week: 3.3 standard drinks     Types: 4 Glasses of wine per week      Current Facility-Administered Medications   Medication Dose Route Frequency Provider Last Rate Last Admin    minoxidiL (LONITEN) tablet 5 mg  5 mg Oral DAILY Arnoldo Ibarra MD        clopidogreL (PLAVIX) tablet 75 mg  75 mg Oral DAILY Arnoldo Ibarra MD        aspirin chewable tablet 81 mg  81 mg Oral DAILY Arnoldo Ibarra MD        atorvastatin (LIPITOR) tablet 10 mg  10 mg Oral DAILY Arnoldo Ibarra MD        amLODIPine (NORVASC) tablet 10 mg  10 mg Oral DAILY Arnoldo Ibarra MD        morphine (ROXANOL) 100 mg/5 mL (20 mg/mL) concentrated solution 10 mg  10 mg Oral Q3H PRN Carmencita Tijerina MD        LORazepam (INTENSOL) 2 mg/mL oral concentrate 1 mg  1 mg Oral Q4H PRN Carmencita Tijerina MD        glycopyrrolate (ROBINUL) injection 0.2 mg  0.2 mg IntraVENous Q4H PRN Carmencita Tijerina MD        scopolamine (TRANSDERM-SCOP) 1 mg over 3 days 1 Patch  1 Patch TransDERmal Q72H PRN Carmencita Tijerina MD        acetaminophen (TYLENOL) tablet 650 mg  650 mg Oral Q4H PRN Jeff Garcia DO        Or    acetaminophen (TYLENOL) solution 650 mg  650 mg Per NG tube Q4H PRN Jeff Garcia DO        Or    acetaminophen (TYLENOL) suppository 650 mg  650 mg Rectal Q4H PRN Jeff Garcia DO        ondansetron (ZOFRAN) injection 4 mg  4 mg IntraVENous Q6H PRN Jeff Garcia DO   4 mg at 04/27/22 1515        Allergies   Allergen Reactions    Ace Inhibitors Cough    Percocet [Oxycodone-Acetaminophen] Nausea and Vomiting     \"I DON'T TOLERATE THE STRONG OPIODS\"       Review of Systems:  A comprehensive review of systems was negative except for that written in the History of Present Illness.     Objective:     Vitals:    05/03/22 0905 05/03/22 2111 05/04/22 0310 05/04/22 0803   BP: (!) 154/75 (!) 165/88 (!) 150/74 (!) 192/67   Pulse: (!) 54 (!) 52 (!) 49 (!) 56   Resp: 16 18 18 18   Temp: 98.2 °F (36.8 °C) 98.2 °F (36.8 °C) 98.7 °F (37.1 °C) 97.7 °F (36.5 °C)   SpO2: 95% 95% 94% 94%   Weight:       Height:            Physical Exam:  General: Patient is pleasant and cooperative and appears to be in no acute distress. HEENT: Normocephalic atraumatic. Appropriate tracking. No scleral icterus. Nares patent. Trachea is midline. Chest: Unlabored respirations. Symmetrical chest expansion. Cardiac: Regular rate and rhythm. Acyanotic  Abdomen: Abdomen is soft, nontender, nondistended. Extremities: Decreased movement RUE. Speech slightly slurred but easy to understand. LUE STR intact  Vascular: Palp BL radial pulses    Assessment:     Hospital Problems  Never Reviewed          Codes Class Noted POA    * (Principal) CVA (cerebral vascular accident) ([facility] [address] ICD-10-CM: I63.9  ICD-9-CM: 434.91  4/26/2022 Unknown            Symptomatic L ICA stenosis    Plan: On initial CTA L ICA near-occluded. Significant improvement on carotid duplex done same day. Patient did receive systemic TPA. Hard to determine extent of underlying plaque burden on initial imaging. I discussed with patient that ideal timing of intervention from a risk-benefit standpoint for a symptomatic high grade stenosis is outside 48 hours but within two weeks. He understands and is eager and willing to proceed with surgery if indicated. Will obtain a repeat left carotid duplex today. Depending on findings he may be an anatomical candidate for TCAR vs. Carotid endarterectomy. --Tentative plan for OR Friday.  TCAR vs. CEA  --Will obtain a L carotid duplex    Signed By: Jihan Perez MD     May 4, 2022

## 2022-05-04 NOTE — PROGRESS NOTES
Problem: Mobility Impaired (Adult and Pediatric)  Goal: *Acute Goals and Plan of Care (Insert Text)  Description: FUNCTIONAL STATUS PRIOR TO ADMISSION: Pt aphasic, did not verbalize, unable to provide PLOF. Physical Therapy Goals  Revised 5/3/2022  1. Patient will move from supine to sit and sit to supine  in bed with minimal assistance within 7 day(s). 2.  Patient will transfer from bed to chair and chair to bed with minimal assistance using the least restrictive device within 7 day(s). 3.  Patient will perform sit to stand with minimal assistance within 7 day(s). 4.  Patient will ambulate with minimal assistance for 50 feet with the least restrictive device within 7 day(s). Physical Therapy Goals  Initiated 4/27/2022  1. Patient will move from supine to sit and sit to supine , scoot up and down, and roll side to side in bed with maximal assistance within 7 day(s). 2.  Patient will transfer from bed to chair and chair to bed with maximal assistance using the least restrictive device within 7 day(s). 3.  Patient will perform sit to stand with maximal assistance within 7 day(s). 4.  Patient will tolerate static sitting EOB x2 minutes with moderate assistance within 7 day(s). Outcome: Progressing Towards Goal   PHYSICAL THERAPY TREATMENT  Patient: Karri Hart (31 y.o. male)  Date: 5/4/2022  Diagnosis: CVA (cerebral vascular accident) [facility]) [I63.9] CVA (cerebral vascular accident) ([facility] [place] [address]       Precautions: Fall (-170)  Chart, physical therapy assessment, plan of care and goals were reviewed. ASSESSMENT  Patient continues with skilled PT services and is progressing towards goals. Patient received in bed and most agreeable to therapy. Reassessed R extremity motor control and patient demonstrating active R dorsi flexion near full range, full SLR and knee extension. RUE remains decreased but with slight grasp, partial shoulder flexion.   Stood with walker for depends change and dependent vale care. Gait with hand hold assist vs walker today. Remains limited with decreased attention to the right, decreased balance, gait instability. Remains excellent candidate for IPR  Current Level of Function Impacting Discharge (mobility/balance): min to mod assist    Other factors to consider for discharge: below baseline of independent and driving         PLAN :  Patient continues to benefit from skilled intervention to address the above impairments. Continue treatment per established plan of care. to address goals. Recommendation for discharge: (in order for the patient to meet his/her long term goals)  Therapy 3 hours per day 5-7 days per week    This discharge recommendation:  Has been made in collaboration with the attending provider and/or case management    IF patient discharges home will need the following DME: to be determined (TBD)       SUBJECTIVE:   Patient stated I'm good.     OBJECTIVE DATA SUMMARY:   Critical Behavior:  Neurologic State: Alert  Orientation Level: Oriented to person  Cognition: Decreased attention/concentration  Safety/Judgement: Decreased awareness of environment,Decreased awareness of need for assistance,Decreased awareness of need for safety,Decreased insight into deficits,Fall prevention  Functional Mobility Training:  Bed Mobility:     Supine to Sit: Minimum assistance     Scooting: Supervision        Transfers:  Sit to Stand: Minimum assistance  Stand to Sit: Minimum assistance        Bed to Chair: Minimum assistance                    Balance:  Sitting: Impaired; Without support  Sitting - Static: Good (unsupported)  Sitting - Dynamic: Good (unsupported)  Standing: Impaired; With support  Standing - Static: Constant support; Fair  Standing - Dynamic : Constant support; Fair  Ambulation/Gait Training:  Distance (ft): 15 Feet (ft)  Assistive Device: Gait belt; Other (comment) (hand hold assist)  Ambulation - Level of Assistance:  Moderate assistance        Gait Abnormalities: Decreased step clearance; Hemiplegic        Base of Support: Center of gravity altered     Speed/Louisa: Pace decreased (<100 feet/min)  Step Length: Right shortened;Left shortened            Therapeutic Exercises: Active/active assist ankle pumps  Pain Ratin    Activity Tolerance:   Fair    After treatment patient left in no apparent distress:   Sitting in chair, Call bell within reach, and Bed / chair alarm activated    COMMUNICATION/COLLABORATION:   The patients plan of care was discussed with: Case management. Patient was educated regarding His deficit(s) of weakness, balance and right side neglect as this relates to His diagnosis of CVA. He demonstrated Fair understanding as evidenced by acknowledging deficits.       Sonam Wilson, PT   Time Calculation: 30 mins

## 2022-05-04 NOTE — PROGRESS NOTES
Palliative Medicine Consult  Zeeshan: [phone]    Patient Name: Julia Miles  YOB: 1940    Date of Initial Consult: 4/28/22  Reason for Consult: Care decisions   Requesting Provider: Jose   Primary Care Physician: Nas Mcfarland MD     SUMMARY:   Julia Miles is a 79 y.o. with a past history of HTN, hypercholesterolemia, osteoarthritis who was admitted on 4/26/2022 from home via EMS after experiencing R sided weakness and aphasia. Given TPA due to concern for stroke. Found to have thrombus causing severe stenosis of L ICA. Neuro IS consulted. Recommended eval for carotid endarterectomy, no endovascular intervention. MRI brain showing numerous acute/subacute nonhemorrhagic ischemia throughout L MCA territory. Able to work w/ therapy 4/27 (poor command following) and placed on puree diet w/ thin liquids but more lethargic today, not very arousable so changed from Plavix and ASA to heparin ggt. Comfort measures only 4/28.  5/2- Pt awake and alert x 3  5/3- Pt awake, able to answer simple questions, interactive- alert only to person, family and city today. 5/4- Doing great w/ therapies, plan for vascular surgery intervention later this week. Current medical issues leading to Palliative Medicine involvement include: care decisions. Social: Pt  x 51 years to Balaji Steinberg (who was hospice volunteer in past)- Balaji Steinberg has moderate dementia. They have 2 daughters- Lorenzo Milan (in [place], is a OB/Gyn NP, her  is an ED physician) and Montrose ([place], [place]). Pt worked w/ [id number], many degrees incl in [facility] and [place]. He would often travel to [place] to help Lorenzo Milan in her garden. Local [employer]. *mPOA is dtr Lorenzo Certain. PALLIATIVE DIAGNOSES:   1. L MCA infarcts, severe L ICA stenosis  2. Significant improvement 5/2/22. 3. Goals of care        PLAN:   1. Pt doing very well w/ all therapies.  Yesterday family concerned about quality of life, but I think that pt has the potential to make quite a good recovery. Thus family okay w/ not only restarting the medications (which we did yesterday) but labs and interventions from the vascular surgeon given severe ICA stenosis. 2. Pt able to give significant input into medical decisions, although I would involve dtr/mPOA Kin Kayden as well. Touched base w/ her today- we still think that DNR status makes medical sense and will talk about the DDNR form tomorrow when she gets here. 3. Full measures- removed \"comfort measures only\" - doing great. Inpatient rehab upon d/c from hospital when stable. Communicated plan of care with: Palliative IDT, [facility] Team    GOALS OF CARE / TREATMENT PREFERENCES:     GOALS OF CARE:  Patient/Health Care Proxy Stated Goals: Rehabilitation    TREATMENT PREFERENCES:   Code Status: DNR    Patient and family's personal goals include: to die w/ comfort       Advance Care Planning:  [x] [facility] Interdisciplinary Team has updated the ACP Navigator with Health Care Decision Maker and Patient Capacity      Primary Decision Maker: Dwayne Lemons - Tania - [phone]  Advance Care Planning 2/13/2018   Patient's Healthcare Decision Maker is: Legal Next of Kin   Primary Decision Maker Name -   Primary Decision Maker Phone Number -   Confirm Advance Directive Yes, not on file       Medical Interventions: Limited additional interventions       Other:    As far as possible, the palliative care team has discussed with patient / health care proxy about goals of care / treatment preferences for patient. HISTORY:     History obtained from: Chart, staff, family , pt     CHIEF COMPLAINT: \"I feel good\"      HPI/SUBJECTIVE:    The patient is:   [x] Verbal and participatory  [] Non-participatory due to: medical situation     Pt w/ wife and other friends at bedside- they talk to me about his pickle ball playing.     Clinical Pain Assessment (nonverbal scale for severity on nonverbal patients):   Clinical Pain Assessment  Severity: 0     Activity (Movement): Lying quietly, normal position    Duration: for how long has pt been experiencing pain (e.g., 2 days, 1 month, years)  Frequency: how often pain is an issue (e.g., several times per day, once every few days, constant)     FUNCTIONAL ASSESSMENT:     Palliative Performance Scale (PPS):  PPS: 70       PSYCHOSOCIAL/SPIRITUAL SCREENING:     Palliative IDT has assessed this patient for cultural preferences / practices and a referral made as appropriate to needs (Cultural Services, Patient Advocacy, Ethics, etc.)    Any spiritual / Confucianism concerns:  [] Yes /  [x] No   If \"Yes\" to discuss with pastoral care during IDT     Does caregiver feel burdened by caring for their loved one:   [] Yes /  [x] No /  [] No Caregiver Present    If \"Yes\" to discuss with social work during IDT    Anticipatory grief assessment:   [x] Normal  / [] Maladaptive     If \"Maladaptive\" to discuss with social work during IDT    ESAS Anxiety: Anxiety: 0    ESAS Depression: Depression: 0        REVIEW OF SYSTEMS:     Positive and pertinent negative findings in ROS are noted above in HPI. The following systems were [x] reviewed / [] unable to be reviewed as noted in HPI  Other findings are noted below. Systems: constitutional, ears/nose/mouth/throat, respiratory, gastrointestinal, genitourinary, musculoskeletal, integumentary, neurologic, psychiatric, endocrine. Positive findings noted below. Modified ESAS Completed by: provider   Fatigue: 5 Drowsiness: 0   Depression: 0 Pain: 0   Anxiety: 0 Nausea: 0   Anorexia: 5 Dyspnea: 0                    PHYSICAL EXAM:     From RN flowsheet:  Wt Readings from Last 3 Encounters:   04/29/22 156 lb 8.4 oz (71 kg)   01/01/22 165 lb (74.8 kg)   02/13/18 162 lb (73.5 kg)     Blood pressure (!) 192/67, pulse (!) 56, temperature 97.7 °F (36.5 °C), resp.  rate 18, height 5' 6\" (1.676 m), weight 156 lb 8.4 oz (71 kg), SpO2 94 %. Pain Scale 1: Numeric (0 - 10)  Pain Intensity 1: 0                 Last bowel movement, if known:     Constitutional: awake, oriented, smiling   HEENT: Nares patent, moist MM   Respiratory: breathing not labored  Musculoskeletal: no deformity, no tenderness to palpation  Skin: warm, dry  Neurologic: following commands and moving all extremities      HISTORY:     Principal Problem:    CVA (cerebral vascular accident) ([facility] [address] (4/26/2022)      Past Medical History:   Diagnosis Date    Ankle fracture 3/2000    Right    Arthritis     Cancer ([facility] [address]     SCCA    Hypercholesterolemia     Hypertension     Personal history of kidney stones 1968    Prostatitis 2001    Psychiatric disorder     CLAUSTROPHOBIA; MASK OVER FACE WOULD CAUSE ANXIETY; HAS REACTED TO TIGHT SPACE (UNDER HOUSE)    Unspecified adverse effect of anesthesia     mother difficult to arouse post anesthesia      Past Surgical History:   Procedure Laterality Date    HX COLONOSCOPY      HX HERNIA REPAIR  0956    UMBILICAL    HX ORTHOPAEDIC Right 2000    ORIF ANKLE WITH PINS    HX ORTHOPAEDIC Right 2017    FOOT SURGERY    HX ORTHOPAEDIC Left 1980s    FOOT SURGERY      Family History   Problem Relation Age of Onset    Dementia Mother     [de-identified] Problems Mother         DIFFICULT TO AROUSE; DID NOT INVOLVE LENGTHY VENTILATOR USE.  Emphysema Father       History reviewed, no pertinent family history. Social History     Tobacco Use    Smoking status: Never Smoker    Smokeless tobacco: Never Used   Substance Use Topics    Alcohol use:  Yes     Alcohol/week: 3.3 standard drinks     Types: 4 Glasses of wine per week     Allergies   Allergen Reactions    Ace Inhibitors Cough    Percocet [Oxycodone-Acetaminophen] Nausea and Vomiting     \"I DON'T TOLERATE THE STRONG OPIODS\"      Current Facility-Administered Medications   Medication Dose Route Frequency    minoxidiL (LONITEN) tablet 5 mg  5 mg Oral DAILY    enoxaparin (LOVENOX) injection 40 mg 40 mg SubCUTAneous Q24H    clopidogreL (PLAVIX) tablet 75 mg  75 mg Oral DAILY    aspirin chewable tablet 81 mg  81 mg Oral DAILY    atorvastatin (LIPITOR) tablet 10 mg  10 mg Oral DAILY    amLODIPine (NORVASC) tablet 10 mg  10 mg Oral DAILY    morphine (ROXANOL) 100 mg/5 mL (20 mg/mL) concentrated solution 10 mg  10 mg Oral Q3H PRN    LORazepam (INTENSOL) 2 mg/mL oral concentrate 1 mg  1 mg Oral Q4H PRN    glycopyrrolate (ROBINUL) injection 0.2 mg  0.2 mg IntraVENous Q4H PRN    scopolamine (TRANSDERM-SCOP) 1 mg over 3 days 1 Patch  1 Patch TransDERmal Q72H PRN    acetaminophen (TYLENOL) tablet 650 mg  650 mg Oral Q4H PRN    Or    acetaminophen (TYLENOL) solution 650 mg  650 mg Per NG tube Q4H PRN    Or    acetaminophen (TYLENOL) suppository 650 mg  650 mg Rectal Q4H PRN    ondansetron (ZOFRAN) injection 4 mg  4 mg IntraVENous Q6H PRN          LAB AND IMAGING FINDINGS:     Lab Results   Component Value Date/Time    WBC 9.3 04/28/2022 06:13 AM    HGB 14.2 04/28/2022 06:13 AM    PLATELET 235 (L) 39/62/0801 06:13 AM     Lab Results   Component Value Date/Time    Sodium 141 04/28/2022 06:20 AM    Potassium 3.0 (L) 04/28/2022 06:20 AM    Chloride 106 04/28/2022 06:20 AM    CO2 27 04/28/2022 06:20 AM    BUN 14 04/28/2022 06:20 AM    Creatinine 1.02 04/28/2022 06:20 AM    Calcium 9.2 04/28/2022 06:20 AM    Magnesium 2.1 04/27/2022 04:28 AM    Phosphorus 2.9 04/27/2022 04:28 AM      Lab Results   Component Value Date/Time    Alk.  phosphatase 104 04/26/2022 05:24 PM    Protein, total 7.6 04/26/2022 05:24 PM    Albumin 4.4 04/26/2022 05:24 PM    Globulin 3.2 04/26/2022 05:24 PM     Lab Results   Component Value Date/Time    INR 1.1 04/26/2022 05:24 PM    Prothrombin time 10.5 04/26/2022 05:24 PM    aPTT 25.2 04/28/2022 10:01 AM      No results found for: IRON, FE, TIBC, IBCT, PSAT, FERR   No results found for: PH, PCO2, PO2  No components found for: [place]   Lab Results   Component Value Date/Time    CK 74 08/07/2009 11:21 AM                Total time25  min   Counseling / coordination time, spent as noted above: 20 min  > 50% counseling / coordination?: yes    Prolonged service was provided for  []30 min   []75 min in face to face time in the presence of the patient, spent as noted above. Time Start:   Time End:   Note: this can only be billed with 63665 (initial) or 55406 (follow up). If multiple start / stop times, list each separately.

## 2022-05-05 LAB
ABO + RH BLD: NORMAL
ANION GAP SERPL CALC-SCNC: 6 MMOL/L (ref 5–15)
BASOPHILS # BLD: 0.1 K/UL (ref 0–0.1)
BASOPHILS NFR BLD: 1 % (ref 0–1)
BLOOD GROUP ANTIBODIES SERPL: NORMAL
BUN SERPL-MCNC: 16 MG/DL (ref 6–20)
BUN/CREAT SERPL: 13 (ref 12–20)
CALCIUM SERPL-MCNC: 9.6 MG/DL (ref 8.5–10.1)
CHLORIDE SERPL-SCNC: 107 MMOL/L (ref 97–108)
CO2 SERPL-SCNC: 26 MMOL/L (ref 21–32)
CREAT SERPL-MCNC: 1.25 MG/DL (ref 0.7–1.3)
DIFFERENTIAL METHOD BLD: ABNORMAL
EOSINOPHIL # BLD: 0.1 K/UL (ref 0–0.4)
EOSINOPHIL NFR BLD: 2 % (ref 0–7)
ERYTHROCYTE [DISTWIDTH] IN BLOOD BY AUTOMATED COUNT: 12.4 % (ref 11.5–14.5)
GLUCOSE SERPL-MCNC: 129 MG/DL (ref 65–100)
HCT VFR BLD AUTO: 49.9 % (ref 36.6–50.3)
HGB BLD-MCNC: 16.8 G/DL (ref 12.1–17)
IMM GRANULOCYTES # BLD AUTO: 0 K/UL (ref 0–0.04)
IMM GRANULOCYTES NFR BLD AUTO: 0 % (ref 0–0.5)
LYMPHOCYTES # BLD: 0.7 K/UL (ref 0.8–3.5)
LYMPHOCYTES NFR BLD: 13 % (ref 12–49)
MCH RBC QN AUTO: 31 PG (ref 26–34)
MCHC RBC AUTO-ENTMCNC: 33.7 G/DL (ref 30–36.5)
MCV RBC AUTO: 92.1 FL (ref 80–99)
MONOCYTES # BLD: 0.6 K/UL (ref 0–1)
MONOCYTES NFR BLD: 11 % (ref 5–13)
NEUTS SEG # BLD: 4.1 K/UL (ref 1.8–8)
NEUTS SEG NFR BLD: 73 % (ref 32–75)
NRBC # BLD: 0 K/UL (ref 0–0.01)
NRBC BLD-RTO: 0 PER 100 WBC
PLATELET # BLD AUTO: 175 K/UL (ref 150–400)
PMV BLD AUTO: 10.8 FL (ref 8.9–12.9)
POTASSIUM SERPL-SCNC: 3.5 MMOL/L (ref 3.5–5.1)
RBC # BLD AUTO: 5.42 M/UL (ref 4.1–5.7)
RBC MORPH BLD: ABNORMAL
SODIUM SERPL-SCNC: 139 MMOL/L (ref 136–145)
SPECIMEN EXP DATE BLD: NORMAL
WBC # BLD AUTO: 5.6 K/UL (ref 4.1–11.1)

## 2022-05-05 PROCEDURE — 36415 COLL VENOUS BLD VENIPUNCTURE: CPT

## 2022-05-05 PROCEDURE — 97535 SELF CARE MNGMENT TRAINING: CPT

## 2022-05-05 PROCEDURE — 86900 BLOOD TYPING SEROLOGIC ABO: CPT

## 2022-05-05 PROCEDURE — 99231 SBSQ HOSP IP/OBS SF/LOW 25: CPT | Performed by: PHYSICAL MEDICINE & REHABILITATION

## 2022-05-05 PROCEDURE — 85025 COMPLETE CBC W/AUTO DIFF WBC: CPT

## 2022-05-05 PROCEDURE — 80048 BASIC METABOLIC PNL TOTAL CA: CPT

## 2022-05-05 PROCEDURE — 74011250636 HC RX REV CODE- 250/636: Performed by: INTERNAL MEDICINE

## 2022-05-05 PROCEDURE — 65270000029 HC RM PRIVATE

## 2022-05-05 PROCEDURE — 74011250637 HC RX REV CODE- 250/637: Performed by: INTERNAL MEDICINE

## 2022-05-05 RX ADMIN — MINOXIDIL 5 MG: 2.5 TABLET ORAL at 09:13

## 2022-05-05 RX ADMIN — ATORVASTATIN CALCIUM 10 MG: 10 TABLET, FILM COATED ORAL at 09:11

## 2022-05-05 RX ADMIN — AMLODIPINE BESYLATE 10 MG: 5 TABLET ORAL at 09:11

## 2022-05-05 RX ADMIN — CLOPIDOGREL BISULFATE 75 MG: 75 TABLET ORAL at 09:11

## 2022-05-05 RX ADMIN — ENOXAPARIN SODIUM 40 MG: 100 INJECTION SUBCUTANEOUS at 14:28

## 2022-05-05 RX ADMIN — ASPIRIN 81 MG CHEWABLE TABLET 81 MG: 81 TABLET CHEWABLE at 09:11

## 2022-05-05 NOTE — PROGRESS NOTES
Physical Therapy  Chart reviewed for updates and attempted to see patient for PT treatment. Informed by OT that she had just attempted to work with patient and he was requesting for therapy to follow up after lunch secondary to lethargy. Will defer per his request and continue to follow.    Juliocesar Guillory, PT, DPT

## 2022-05-05 NOTE — PROGRESS NOTES
Palliative Medicine Consult  Zeeshan: [phone] ([phone])    Patient Name: Beulah Almendarez  YOB: 1940    Date of Initial Consult: 4/28/22  Reason for Consult: Care decisions   Requesting Provider: Jose   Primary Care Physician: Ariane Valero MD     SUMMARY:   Beulah Almendarez is a 79 y.o. with a past history of HTN, hypercholesterolemia, osteoarthritis who was admitted on 4/26/2022 from home via EMS after experiencing R sided weakness and aphasia. Given TPA due to concern for stroke. Found to have thrombus causing severe stenosis of L ICA. Neuro IS consulted. Recommended eval for carotid endarterectomy, no endovascular intervention. MRI brain showing numerous acute/subacute nonhemorrhagic ischemia throughout L MCA territory. Able to work w/ therapy 4/27 (poor command following) and placed on puree diet w/ thin liquids but more lethargic today, not very arousable so changed from Plavix and ASA to heparin ggt. Comfort measures only 4/28.  5/2- Pt awake and alert x 3  5/3- Pt awake, able to answer simple questions, interactive- alert only to person, family and city today. 5/4- Doing great w/ therapies, plan for vascular surgery intervention later this week. Current medical issues leading to Palliative Medicine involvement include: care decisions. Social: Pt  x 51 years to Tania Arevalo (who was hospice volunteer in past)- Tania Arevalo has moderate dementia. They have 2 daughters- Alejandro (in [place], is a OB/Gyn NP, her  is an ED physician) and Mikayla De La Obird ([facility], [address] Pt worked w/ [id number], many degrees incl in [address] [facility] and [place]. He would often travel to [place] to help Alejandro in her garden. 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 *mPOA is dtr Alejandro. PALLIATIVE DIAGNOSES:   1. L MCA infarcts, severe L ICA stenosis  2. Significant improvement 5/2/22. 3. Goals of care        PLAN:   1. Pt continues to do great.    2. Meet w/ him and dtr Alejandro- pt able to make complex medical decisions. Fully oriented. Pleased that he has improved, plans for vascular intervention, continued medical management and rehab. 3. Very clear however that he does not want aggressive measures in the event of cardiopulmonary arrest. Would not want to be in a position where he required 24/7 nursing care. Would never want a feeding tube. DDNR signed. 4. Signing off, family has my contact info. Communicated plan of care with: Palliative IDTNilesh 192 Team    GOALS OF CARE / TREATMENT PREFERENCES:     GOALS OF CARE:  Patient/Health Care Proxy Stated Goals: Rehabilitation    TREATMENT PREFERENCES:   Code Status: DNR    Patient and family's personal goals include: to die w/ comfort       Advance Care Planning:  [x] [facility] Interdisciplinary Team has updated the ACP Navigator with Health Care Decision Maker and Patient Capacity      Primary Decision Maker: Zoltan Marin - [phone]  Advance Care Planning 2/13/2018   Patient's Healthcare Decision Maker is: Legal Next of Kin   Primary Decision Maker Name -   Primary Decision Maker Phone Number -   Confirm Advance Directive Yes, not on file       Medical Interventions: Limited additional interventions       Other:    As far as possible, the palliative care team has discussed with patient / health care proxy about goals of care / treatment preferences for patient. HISTORY:     History obtained from: Chart, staff, family , pt     CHIEF COMPLAINT: eating lunch       HPI/SUBJECTIVE:    The patient is:   [x] Verbal and participatory  [] Non-participatory due to: medical situation     Pt drinking [employer]bucks and about to eat lunch. No pain or other complaints.      Clinical Pain Assessment (nonverbal scale for severity on nonverbal patients):   Clinical Pain Assessment  Severity: 0     Activity (Movement): Lying quietly, normal position    Duration: for how long has pt been experiencing pain (e.g., 2 days, 1 month, years)  Frequency: how often pain is an issue (e.g., several times per day, once every few days, constant)     FUNCTIONAL ASSESSMENT:     Palliative Performance Scale (PPS):  PPS: 70       PSYCHOSOCIAL/SPIRITUAL SCREENING:     Palliative IDT has assessed this patient for cultural preferences / practices and a referral made as appropriate to needs (Cultural Services, Patient Advocacy, Ethics, etc.)    Any spiritual / Scientologist concerns:  [] Yes /  [x] No   If \"Yes\" to discuss with pastoral care during IDT     Does caregiver feel burdened by caring for their loved one:   [] Yes /  [x] No /  [] No Caregiver Present    If \"Yes\" to discuss with social work during IDT    Anticipatory grief assessment:   [x] Normal  / [] Maladaptive     If \"Maladaptive\" to discuss with social work during IDT    ESAS Anxiety: Anxiety: 0    ESAS Depression: Depression: 0        REVIEW OF SYSTEMS:     Positive and pertinent negative findings in ROS are noted above in HPI. The following systems were [x] reviewed / [] unable to be reviewed as noted in HPI  Other findings are noted below. Systems: constitutional, ears/nose/mouth/throat, respiratory, gastrointestinal, genitourinary, musculoskeletal, integumentary, neurologic, psychiatric, endocrine. Positive findings noted below. Modified ESAS Completed by: provider   Fatigue: 5 Drowsiness: 0   Depression: 0 Pain: 0   Anxiety: 0 Nausea: 0   Anorexia: 5 Dyspnea: 0                    PHYSICAL EXAM:     From RN flowsheet:  Wt Readings from Last 3 Encounters:   04/29/22 156 lb 8.4 oz (71 kg)   01/01/22 165 lb (74.8 kg)   02/13/18 162 lb (73.5 kg)     Blood pressure 128/65, pulse (!) 52, temperature 98.4 °F (36.9 °C), resp. rate 17, height 5' 6\" (1.676 m), weight 156 lb 8.4 oz (71 kg), SpO2 93 %.     Pain Scale 1: Numeric (0 - 10)  Pain Intensity 1: 0                 Last bowel movement, if known:     Constitutional: awake, oriented, smiling   HEENT: Nares patent, moist MM   Respiratory: breathing not labored  Musculoskeletal: no deformity, no tenderness to palpation  Skin: warm, dry  Neurologic: following commands and moving all extremities      HISTORY:     Principal Problem:    CVA (cerebral vascular accident) ([facility] [address] (4/26/2022)      Past Medical History:   Diagnosis Date    Ankle fracture 3/2000    Right    Arthritis     Cancer ([facility] [address]     SCCA    Hypercholesterolemia     Hypertension     Personal history of kidney stones 1968    Prostatitis 2001    Psychiatric disorder     CLAUSTROPHOBIA; MASK OVER FACE WOULD CAUSE ANXIETY; HAS REACTED TO TIGHT SPACE (UNDER HOUSE)    Unspecified adverse effect of anesthesia     mother difficult to arouse post anesthesia      Past Surgical History:   Procedure Laterality Date    HX COLONOSCOPY      HX HERNIA REPAIR  2280    UMBILICAL    HX ORTHOPAEDIC Right 2000    ORIF ANKLE WITH PINS    HX ORTHOPAEDIC Right 2017    FOOT SURGERY    HX ORTHOPAEDIC Left 1980s    FOOT SURGERY      Family History   Problem Relation Age of Onset    Dementia Mother     Anesth Problems Mother         DIFFICULT TO AROUSE; DID NOT INVOLVE LENGTHY VENTILATOR USE.  Emphysema Father       History reviewed, no pertinent family history. Social History     Tobacco Use    Smoking status: Never Smoker    Smokeless tobacco: Never Used   Substance Use Topics    Alcohol use:  Yes     Alcohol/week: 3.3 standard drinks     Types: 4 Glasses of wine per week     Allergies   Allergen Reactions    Ace Inhibitors Cough    Percocet [Oxycodone-Acetaminophen] Nausea and Vomiting     \"I DON'T TOLERATE THE STRONG OPIODS\"      Current Facility-Administered Medications   Medication Dose Route Frequency    minoxidiL (LONITEN) tablet 5 mg  5 mg Oral DAILY    enoxaparin (LOVENOX) injection 40 mg  40 mg SubCUTAneous Q24H    clopidogreL (PLAVIX) tablet 75 mg  75 mg Oral DAILY    aspirin chewable tablet 81 mg  81 mg Oral DAILY    atorvastatin (LIPITOR) tablet 10 mg  10 mg Oral DAILY    amLODIPine (NORVASC) tablet 10 mg  10 mg Oral DAILY    morphine (ROXANOL) 100 mg/5 mL (20 mg/mL) concentrated solution 10 mg  10 mg Oral Q3H PRN    LORazepam (INTENSOL) 2 mg/mL oral concentrate 1 mg  1 mg Oral Q4H PRN    glycopyrrolate (ROBINUL) injection 0.2 mg  0.2 mg IntraVENous Q4H PRN    scopolamine (TRANSDERM-SCOP) 1 mg over 3 days 1 Patch  1 Patch TransDERmal Q72H PRN    acetaminophen (TYLENOL) tablet 650 mg  650 mg Oral Q4H PRN    Or    acetaminophen (TYLENOL) solution 650 mg  650 mg Per NG tube Q4H PRN    Or    acetaminophen (TYLENOL) suppository 650 mg  650 mg Rectal Q4H PRN    ondansetron (ZOFRAN) injection 4 mg  4 mg IntraVENous Q6H PRN          LAB AND IMAGING FINDINGS:     Lab Results   Component Value Date/Time    WBC 5.6 05/05/2022 09:12 AM    HGB 16.8 05/05/2022 09:12 AM    PLATELET 514 97/93/9314 09:12 AM     Lab Results   Component Value Date/Time    Sodium 141 04/28/2022 06:20 AM    Potassium 3.0 (L) 04/28/2022 06:20 AM    Chloride 106 04/28/2022 06:20 AM    CO2 27 04/28/2022 06:20 AM    BUN 14 04/28/2022 06:20 AM    Creatinine 1.02 04/28/2022 06:20 AM    Calcium 9.2 04/28/2022 06:20 AM    Magnesium 2.1 04/27/2022 04:28 AM    Phosphorus 2.9 04/27/2022 04:28 AM      Lab Results   Component Value Date/Time    Alk.  phosphatase 104 04/26/2022 05:24 PM    Protein, total 7.6 04/26/2022 05:24 PM    Albumin 4.4 04/26/2022 05:24 PM    Globulin 3.2 04/26/2022 05:24 PM     Lab Results   Component Value Date/Time    INR 1.1 04/26/2022 05:24 PM    Prothrombin time 10.5 04/26/2022 05:24 PM    aPTT 25.2 04/28/2022 10:01 AM      No results found for: IRON, FE, TIBC, IBCT, PSAT, FERR   No results found for: PH, PCO2, PO2  No components found for: [place]   Lab Results   Component Value Date/Time    CK 74 08/07/2009 11:21 AM                Total time 15 min   Counseling / coordination time, spent as noted above: 10 min  > 50% counseling / coordination?: yes    Prolonged service was provided for  []30 min   []75 min in face to face time in the presence of the patient, spent as noted above. Time Start:   Time End:   Note: this can only be billed with 80458 (initial) or 45373 (follow up). If multiple start / stop times, list each separately.

## 2022-05-05 NOTE — PROGRESS NOTES
[facility]  Hospitalist Group                                                                                          Hospitalist Progress Note  Samantha Davenport MD  Answering service: [phone] from in house phone        Date of Service:  2022  NAME:  An Duncan  :  1940  MRN:  [id number]      Admission Summary:   Copied form admit: \" Karishma Ybarra a 79 y. o. with a past history of HTN, hypercholesterolemia, osteoarthritis who was admitted on 2022 from home via EMS after experiencing R sided weakness and aphasia. Given TPA due to concern for stroke. And then admitted to ICU after tpa.   Found to have thrombus causing severe stenosis of L ICA. Neuro IS consulted. Recommended vascular surgen eval for carotid endarterectomy, no endovascular intervention. MRI brain showing numerous acute/subacute nonhemorrhagic ischemia throughout L MCA territory. Able to work w/ therapy  (poor command following) and placed on puree diet w/ thin liquids but more lethargic today, not very arousable so changed from Plavix and ASA to heparin ggt. Palliative consulted, and family members decide  transition to comfort care \"    Interval history / Subjective:     Seen and examined at bedside earlier. No acute complaints, feel well, tentative TCAR vs CEA       Assessment & Plan:       Assessment and Plan:     1. Acute. L MCA infarcts : s/p tpa   Brain MRI: Numerous foci of acute or subacute nonhemorrhagic ischemia throughout the  left middle cerebral artery territory and left basal ganglia regions  - palliative following, cont fulll measures at this time, no longer comfort however patient now noted to be DNR     2. 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 AMS/Acute encephalopathy due to cva  - resolving    4. Aphasia/dysphagia   - diet advanced to easy chew 5/3   -appreciate speech     5.  Hypokalemia   Replete prn    Appreciated palliative care involvement patient is now DNR        Physical exam  General: No acute distress appears stated age  CVS: Regular rate and rhythm no murmurs gallops rubs  Pulm: Clear to auscultation bilaterally  Abdomen: Soft nontender nondistended positive bowel sounds  Extremities: No rash or lesion 2+ cap refill no peripheral extremity edema  Neuro: ANO x3 cranial nerves II to XII grossly intact      Hospital Problems  Never Reviewed          Codes Class Noted POA    * (Principal) CVA (cerebral vascular accident) [facility]) ICD-10-CM: I63.9  ICD-9-CM: 434.91  4/26/2022 Unknown                    Data Review:    Review and/or order of clinical lab test      Labs:     Recent Labs     05/05/22  0912   WBC 5.6   HGB 16.8   HCT 49.9        No results for input(s): NA, K, CL, CO2, BUN, CREA, GLU, CA, MG, PHOS, URICA in the last 72 hours. No results for input(s): ALT, AP, TBIL, TBILI, TP, ALB, GLOB, GGT, AML, LPSE in the last 72 hours. No lab exists for component: SGOT, GPT, AMYP, HLPSE  No results for input(s): INR, PTP, APTT, INREXT, INREXT in the last 72 hours. No results for input(s): FE, TIBC, PSAT, FERR in the last 72 hours. No results found for: FOL, RBCF   No results for input(s): PH, PCO2, PO2 in the last 72 hours. No results for input(s): CPK, CKNDX, TROIQ in the last 72 hours.     No lab exists for component: CPKMB  Lab Results   Component Value Date/Time    Cholesterol, total 187 04/27/2022 04:28 AM    HDL Cholesterol 66 04/27/2022 04:28 AM    LDL, calculated 107.2 (H) 04/27/2022 04:28 AM    Triglyceride 69 04/27/2022 04:28 AM    CHOL/HDL Ratio 2.8 04/27/2022 04:28 AM     Lab Results   Component Value Date/Time    Glucose (POC) 97 04/26/2022 05:12 PM    Glucose (POC) 94 02/13/2018 07:20 AM     Lab Results   Component Value Date/Time    Color YELLOW/STRAW 01/29/2018 08:49 AM    Appearance CLEAR 01/29/2018 08:49 AM    Specific gravity 1.019 01/29/2018 08:49 AM    pH (UA) 6.0 01/29/2018 08:49 AM    Protein NEGATIVE  01/29/2018 08:49 AM    Glucose NEGATIVE  01/29/2018 08:49 AM    Ketone NEGATIVE  01/29/2018 08:49 AM    Bilirubin NEGATIVE  01/29/2018 08:49 AM    Urobilinogen 0.2 01/29/2018 08:49 AM    Nitrites NEGATIVE  01/29/2018 08:49 AM    Leukocyte Esterase NEGATIVE  01/29/2018 08:49 AM    Epithelial cells FEW 01/29/2018 08:49 AM    Bacteria NEGATIVE  01/29/2018 08:49 AM    WBC 0-4 01/29/2018 08:49 AM    RBC 0-5 01/29/2018 08:49 AM         Medications Reviewed:     Current Facility-Administered Medications   Medication Dose Route Frequency    minoxidiL (LONITEN) tablet 5 mg  5 mg Oral DAILY    enoxaparin (LOVENOX) injection 40 mg  40 mg SubCUTAneous Q24H    clopidogreL (PLAVIX) tablet 75 mg  75 mg Oral DAILY    aspirin chewable tablet 81 mg  81 mg Oral DAILY    atorvastatin (LIPITOR) tablet 10 mg  10 mg Oral DAILY    amLODIPine (NORVASC) tablet 10 mg  10 mg Oral DAILY    morphine (ROXANOL) 100 mg/5 mL (20 mg/mL) concentrated solution 10 mg  10 mg Oral Q3H PRN    LORazepam (INTENSOL) 2 mg/mL oral concentrate 1 mg  1 mg Oral Q4H PRN    glycopyrrolate (ROBINUL) injection 0.2 mg  0.2 mg IntraVENous Q4H PRN    scopolamine (TRANSDERM-SCOP) 1 mg over 3 days 1 Patch  1 Patch TransDERmal Q72H PRN    acetaminophen (TYLENOL) tablet 650 mg  650 mg Oral Q4H PRN    Or    acetaminophen (TYLENOL) solution 650 mg  650 mg Per NG tube Q4H PRN    Or    acetaminophen (TYLENOL) suppository 650 mg  650 mg Rectal Q4H PRN    ondansetron (ZOFRAN) injection 4 mg  4 mg IntraVENous Q6H PRN     ______________________________________________________________________  EXPECTED LENGTH OF STAY: 3d 4h  ACTUAL LENGTH OF STAY:          9                 Deya Shields MD

## 2022-05-05 NOTE — PROGRESS NOTES
Problem: Self Care Deficits Care Plan (Adult)  Goal: *Acute Goals and Plan of Care (Insert Text)  Description: FUNCTIONAL STATUS PRIOR TO ADMISSION: Patient was independent and active without use of DME per chart review, patient unable to provide background 2/2 aphasia. HOME SUPPORT: The patient lived with wife but did not require assist.    Occupational Therapy Goals  Re-evaluation 5/3/2022   1. Patient will perform grooming with supervision/set-up within 7 day(s). 2.  Patient will perform upper body dressing with minimal assistance within 7 day(s). 3.  Patient will perform lower body dressing with moderate assistance  within 7 day(s). 4.  Patient will perform toilet transfers with minimal assistance/contact guard assist within 7 day(s). 5.  Patient will perform all aspects of toileting with moderate assistance within 7 day(s). 6.  Patient will follow simple 2 step commands with 50% accuracy with moderate verbal cueing in 7 day(s). 7.  Patient will utilize RUE during functional activities with moderate tactile and visual cues  cues within 7 day(s). Initiated 4/27/2022   1. Patient will perform 2 simple grooming tasks in supported sitting with moderate assistance within 7 day(s). 2.  Patient will perform anterior neck to thigh bathing in supported sitting with moderate assistance within 7 day(s). 3.  Patient will tolerate sitting EOB for 5 minutes in prep for ADLs and OOB mobility with mod A within 7 days. 4.  Patient will perform toilet transfers to/from [facility] with moderate assistance x2 within 7 day(s). 5.  Patient will participate in upper extremity therapeutic exercise/activities with moderate assistance within 7 day(s). 6.  Patient will participate in the [facility] in prep for ADLs within 7 days.   Outcome: Progressing Towards Goal   OCCUPATIONAL THERAPY TREATMENT  Patient: Jourdan Garcia (62 y.o. male)  Date: 5/5/2022  Diagnosis: CVA (cerebral vascular accident) [facility]) [I63.9] CVA (cerebral vascular accident) ([facility] [address]  Procedure(s) (LRB):  TRANSCAROTID ARTERY REVASCULARIZATION (GETA-GENERAL ENDOTRACHEAL ANESTHESIA) (N/A)    Precautions: Fall (-170)  Chart, occupational therapy assessment, plan of care, and goals were reviewed. ASSESSMENT  Patient continues with skilled OT services and is progressing towards goals. Despite reported fatigue, completed in bed activity due to need for hygiene and brief change, overall S for bed mobility for scooting and rolling, remains limited by R inattention and hemiplegia, with cues and A able to reach with R UE for L bed rail to A with rolling. Following all simple 1 step commands. Patient with complaint of confusion but speech intact, reoriented as able and provided all needs. Patient will need significant neuro rehab, recommend IP rehab to return patient to PLOF of independent. Current Level of Function Impacting Discharge (ADLs): S for bed mobility, up to max A for LB ADLs    Other factors to consider for discharge: CVA-L MCA with R inattention/hemiplegia         PLAN :  Patient continues to benefit from skilled intervention to address the above impairments. Continue treatment per established plan of care to address goals. Recommend with staff: encouarged ADL participation    Recommend next OT session: BSC transfer retraining    Recommendation for discharge: (in order for the patient to meet his/her long term goals)  Therapy 3 hours per day 5-7 days per week    This discharge recommendation:  A follow-up discussion with the attending provider and/or case management is planned    IF patient discharges home will need the following DME: AE: long handled bathing, AE: long handled dressing, bedside commode, shower chair, walker: rolling, wheelchair, and however unsafe to go home at this time       SUBJECTIVE:   Patient stated I just don't understand what's going on.  re: reoriented to situation    OBJECTIVE DATA SUMMARY: Cognitive/Behavioral Status:  Neurologic State: Alert     Cognition: Follows commands             Functional Mobility and Transfers for ADLs:  Bed Mobility:  Rolling: Stand-by assistance  Scooting: Stand-by assistance    Transfers:      Declined OOB or EOB       Balance:       ADL Intervention:                  Patient instructed and indicated understanding the benefits of maintaining activity tolerance, functional mobility, and independence with self care tasks during acute stay  to ensure safe return home and to baseline. Encouraged patient to increase frequency and duration OOB, be out of bed for all meals, perform daily ADLs (as approved by RN/MD regarding bathing etc), and performing functional mobility to/from bathroom. Toileting  Bladder Hygiene: Set-up  Clothing Management: Maximum assistance  Cues: Visual/perceptual training/retraining         Therapeutic Exercises:       Pain:  none    Activity Tolerance:   Fair    After treatment patient left in no apparent distress:   Supine in bed, Call bell within reach, and Bed / chair alarm activated    COMMUNICATION/COLLABORATION:   The patients plan of care was discussed with: Physical therapist and Registered nurse.      Patria Brooke OT  Time Calculation: 10 mins

## 2022-05-05 NOTE — PROGRESS NOTES
Seen this morning. L carotid duplex reviewed.     Patient is now a good candidate for L TCAR    --OR Friday for L TCAR  --Maxx[employer]  --Type and screen  --Consent  --Patient to continue taking aspirin and plavix INCLUDING day of surgery

## 2022-05-05 NOTE — PROGRESS NOTES
Occupational Therapy    Chart reviewed, cleared by RN, patient declined OOB activity but agreeable to rolling for toileting needs, will follow up later for OT tx, patient requesting around noon due to lethargy this AM.    David Brooke MS OTR/L

## 2022-05-05 NOTE — PROGRESS NOTES
Comprehensive Nutrition Assessment    Type and Reason for Visit: Initial,RD nutrition re-screen/LOS    Nutrition Recommendations/Plan:   1. Monitor and replete K+    2. Continue current diet with Ensure Enlive BID and Magic Cup once daily       Malnutrition Assessment:  Malnutrition Status:  Mild malnutrition (05/05/22 1114)    Context:  Acute illness     Findings of the 6 clinical characteristics of malnutrition:   Energy Intake:  75% or less of est energy req for 7 or more days  Weight Loss:  Unable to assess     Body Fat Loss:  Mild body fat loss, Orbital,Buccal region   Muscle Mass Loss:  Mild muscle mass loss, Temples (temporalis)  Fluid Accumulation:  No significant fluid accumulation,     Strength:  Not performed        Nutrition Assessment:    PMHx includes hypercholesterolemia, HTN. Pt admitted after experiencing stroke like symptoms, found severe stenosis of L ICA- vascular surgery planned for Friday. Pt originally experienced some aphasia/dusphagia and required a puree diet per SLP. Has since been cleared for regular diet/thin liquids. Palliative and hospice following- does not think pt is good candidate for hospice currently but family considering it for the future, current plans for inpatient rehab. Labs reviewed, noted hypokalemia- resume klor-con. Spoke with pt at bedside. He reported that he hasn't been eating well during this admission but has been drinking almost 2 Ensure Enlive's per day. However, spoke with RN and she reported that the pt has been eating great. He had a hard time verbalizing why he wasn't eating but he seemed to just not enjoy the food. I told him that he could have his family bring in food for him to encourage intake. He was also receptive to trying a Magic Cup at dinner and reducing the about of Ensures to 2 per day. He reported that he had been eating very normally at home PTA, about 3 meals a day.  Wt documentation shows a ~ 10 lb wt loss since January although he has not noticed any wt loss. Nutritionally Significant Medications:  Norvasc, lipitor, plavix, lovenox, minoxidil     Estimated Daily Nutrient Needs:  Energy Requirements Based On: Formula  Weight Used for Energy Requirements: Current  Energy (kcal/day): 3522-6460 (MSJ x 1.2 x 1.0-1.1)  Weight Used for Protein Requirements: Current  Protein (g/day): 78-92 (1.1-1.3 g/kg)  Method Used for Fluid Requirements: 1 ml/kcal  Fluid (ml/day): 1800    Nutrition Related Findings:   Edema: none  Last BM: 05/03/22,      Wounds: None      Current Nutrition Therapies:  Diet Order: Regular  Supplements: Ensure Enlive 4x/day  Meal Intake:   Patient Vitals for the past 168 hrs:   % Diet Eaten   05/04/22 1236 51 - 75%   05/04/22 0803 26 - 50%     Supplement Intake:  No data found. Anthropometric Measures:  Height: 5' 6\" (167.6 cm)  Ideal Body Weight (IBW): 142 lbs (65 kg)  Admission Body Weight: 163 lb 2.3 oz  Current Body Wt:  71 kg (156 lb 8.4 oz), 110.2 % IBW. Bed scale  Current BMI (kg/m2): 25.3        Weight Adjustment: No adjustment                 BMI Category: Overweight (BMI 25.0-29. 9)    Wt Readings from Last 10 Encounters:   04/29/22 71 kg (156 lb 8.4 oz)   01/01/22 74.8 kg (165 lb)   02/13/18 73.5 kg (162 lb)   01/29/18 73.5 kg (162 lb)   01/13/16 68.9 kg (152 lb)   02/18/11 72.6 kg (160 lb)           Nutrition Diagnosis:   · Inadequate oral intake related to cognitive or neurological impairment as evidenced by intake 26-50%    Nutrition Interventions:   Food and/or Nutrient Delivery: Continue current diet,Modify oral nutrition supplement  Nutrition Education/Counseling: No recommendations at this time  Coordination of Nutrition Care: Continue to monitor while inpatient       Goals:     Goals:  (PO intake >50% of meals nad 2 ONS daily within 5-7 days.)       Nutrition Monitoring and Evaluation:   Behavioral-Environmental Outcomes: None identified  Food/Nutrient Intake Outcomes: Food and nutrient intake,Supplement intake  Physical Signs/Symptoms Outcomes: Biochemical data,Meal time behavior    Discharge Planning:     Too soon to determine    Andreas Burk, Dietetic Intern

## 2022-05-05 NOTE — ACP (ADVANCE CARE PLANNING)
Advance care planning    Patient w/ AMD on file. Dtr Tammy Burdick is mPOA as wife Shasta Rowland w/ mild dementia. Pt pleased that he has improved, plans for vascular intervention, continued medical management and rehab. Very clear however that he does not want aggressive measures in the event of cardiopulmonary arrest. Would not want to be in a position where he required 24/7 nursing care. Would never want a feeding tube. DDNR signed.        Primary Decision Maker: Guille Poole - Daughter - [phone]

## 2022-05-05 NOTE — PROGRESS NOTES
VIMAL:  1. RUR-12%  2. OR on Friday with Vascular surgery. 3. [facility] accepted pending bed and medical stability. 3. Transport- BLS vs family. CM met with patient and family at bedside after speaking with MD during IDRs this morning. MD stated patient may ready Saturday. Family was interested in [place], but understands that [facility] would not have a bed until mid next week. They are agreeable to [facility] and the Rashi Judge will meet with family on Friday to discuss the rehab process. CM will follow up with patient daughter Leslye Crockett in the morning to determine a meeting time based on when the patient will go to the OR. Medicare pt has received, reviewed, and signed 2nd IM letter informing them of their right to appeal the discharge. Signed copy has been placed on pt bedside chart.         [facility]

## 2022-05-06 ENCOUNTER — APPOINTMENT (OUTPATIENT)
Dept: GENERAL RADIOLOGY | Age: 82
DRG: 037 | End: 2022-05-06
Attending: SURGERY
Payer: MEDICARE

## 2022-05-06 ENCOUNTER — ANESTHESIA EVENT (OUTPATIENT)
Dept: SURGERY | Age: 82
DRG: 037 | End: 2022-05-06
Payer: MEDICARE

## 2022-05-06 ENCOUNTER — ANESTHESIA (OUTPATIENT)
Dept: SURGERY | Age: 82
DRG: 037 | End: 2022-05-06
Payer: MEDICARE

## 2022-05-06 PROCEDURE — 77030040922 HC BLNKT HYPOTHRM STRY -A

## 2022-05-06 PROCEDURE — C1894 INTRO/SHEATH, NON-LASER: HCPCS | Performed by: SURGERY

## 2022-05-06 PROCEDURE — 77030002916 HC SUT ETHLN J&J -A: Performed by: SURGERY

## 2022-05-06 PROCEDURE — 97116 GAIT TRAINING THERAPY: CPT

## 2022-05-06 PROCEDURE — 77030013060 HC DEV INFL PRSS MRTM -B: Performed by: SURGERY

## 2022-05-06 PROCEDURE — 77030013797 HC KT TRNSDUC PRSSR EDWD -A

## 2022-05-06 PROCEDURE — 74011250636 HC RX REV CODE- 250/636: Performed by: ANESTHESIOLOGY

## 2022-05-06 PROCEDURE — 74011000250 HC RX REV CODE- 250: Performed by: NURSE ANESTHETIST, CERTIFIED REGISTERED

## 2022-05-06 PROCEDURE — 77030038552 HC DRN WND MDII -A: Performed by: SURGERY

## 2022-05-06 PROCEDURE — 03HB33Z INSERTION OF INFUSION DEVICE INTO RIGHT RADIAL ARTERY, PERCUTANEOUS APPROACH: ICD-10-PCS | Performed by: ANESTHESIOLOGY

## 2022-05-06 PROCEDURE — 76010000162 HC OR TIME 1.5 TO 2 HR INTENSV-TIER 1: Performed by: SURGERY

## 2022-05-06 PROCEDURE — 77030002996 HC SUT SLK J&J -A: Performed by: SURGERY

## 2022-05-06 PROCEDURE — 2709999900 HC NON-CHARGEABLE SUPPLY: Performed by: SURGERY

## 2022-05-06 PROCEDURE — 74011250637 HC RX REV CODE- 250/637: Performed by: INTERNAL MEDICINE

## 2022-05-06 PROCEDURE — 74011250636 HC RX REV CODE- 250/636: Performed by: SURGERY

## 2022-05-06 PROCEDURE — 97112 NEUROMUSCULAR REEDUCATION: CPT

## 2022-05-06 PROCEDURE — 76210000000 HC OR PH I REC 2 TO 2.5 HR: Performed by: SURGERY

## 2022-05-06 PROCEDURE — 77030018824 HC SHNT CAR ARGY COVD -B: Performed by: SURGERY

## 2022-05-06 PROCEDURE — 77030008684 HC TU ET CUF COVD -B: Performed by: NURSE ANESTHETIST, CERTIFIED REGISTERED

## 2022-05-06 PROCEDURE — 65270000046 HC RM TELEMETRY

## 2022-05-06 PROCEDURE — 77030019702 HC WRP THER MENM -C: Performed by: SURGERY

## 2022-05-06 PROCEDURE — 77030026438 HC STYL ET INTUB CARD -A: Performed by: NURSE ANESTHETIST, CERTIFIED REGISTERED

## 2022-05-06 PROCEDURE — 77030002986 HC SUT PROL J&J -A: Performed by: SURGERY

## 2022-05-06 PROCEDURE — C1892 INTRO/SHEATH,FIXED,PEEL-AWAY: HCPCS | Performed by: SURGERY

## 2022-05-06 PROCEDURE — 76060000035 HC ANESTHESIA 2 TO 2.5 HR: Performed by: SURGERY

## 2022-05-06 PROCEDURE — 77030031139 HC SUT VCRL2 J&J -A: Performed by: SURGERY

## 2022-05-06 PROCEDURE — 74011000250 HC RX REV CODE- 250: Performed by: ANESTHESIOLOGY

## 2022-05-06 PROCEDURE — 77030008462 HC STPLR SKN PROX J&J -A: Performed by: SURGERY

## 2022-05-06 PROCEDURE — 77030002996 HC SUT SLK J&J -A

## 2022-05-06 PROCEDURE — 77030002933 HC SUT MCRYL J&J -A: Performed by: SURGERY

## 2022-05-06 PROCEDURE — 74011250636 HC RX REV CODE- 250/636: Performed by: NURSE ANESTHETIST, CERTIFIED REGISTERED

## 2022-05-06 PROCEDURE — 74011000250 HC RX REV CODE- 250: Performed by: SURGERY

## 2022-05-06 PROCEDURE — 77030010507 HC ADH SKN DERMBND J&J -B: Performed by: SURGERY

## 2022-05-06 RX ORDER — HEPARIN SODIUM 1000 [USP'U]/ML
INJECTION, SOLUTION INTRAVENOUS; SUBCUTANEOUS AS NEEDED
Status: DISCONTINUED | OUTPATIENT
Start: 2022-05-06 | End: 2022-05-06 | Stop reason: HOSPADM

## 2022-05-06 RX ORDER — SODIUM CHLORIDE, SODIUM LACTATE, POTASSIUM CHLORIDE, CALCIUM CHLORIDE 600; 310; 30; 20 MG/100ML; MG/100ML; MG/100ML; MG/100ML
25 INJECTION, SOLUTION INTRAVENOUS CONTINUOUS
Status: DISCONTINUED | OUTPATIENT
Start: 2022-05-06 | End: 2022-05-06 | Stop reason: HOSPADM

## 2022-05-06 RX ORDER — CEFAZOLIN SODIUM 1 G/3ML
INJECTION, POWDER, FOR SOLUTION INTRAMUSCULAR; INTRAVENOUS AS NEEDED
Status: DISCONTINUED | OUTPATIENT
Start: 2022-05-06 | End: 2022-05-06 | Stop reason: HOSPADM

## 2022-05-06 RX ORDER — FENTANYL CITRATE 50 UG/ML
50 INJECTION, SOLUTION INTRAMUSCULAR; INTRAVENOUS AS NEEDED
Status: DISCONTINUED | OUTPATIENT
Start: 2022-05-06 | End: 2022-05-06 | Stop reason: HOSPADM

## 2022-05-06 RX ORDER — ONDANSETRON 2 MG/ML
INJECTION INTRAMUSCULAR; INTRAVENOUS AS NEEDED
Status: DISCONTINUED | OUTPATIENT
Start: 2022-05-06 | End: 2022-05-06 | Stop reason: HOSPADM

## 2022-05-06 RX ORDER — SODIUM CHLORIDE 0.9 % (FLUSH) 0.9 %
5-40 SYRINGE (ML) INJECTION AS NEEDED
Status: DISCONTINUED | OUTPATIENT
Start: 2022-05-06 | End: 2022-05-06 | Stop reason: HOSPADM

## 2022-05-06 RX ORDER — OXYCODONE HYDROCHLORIDE 5 MG/1
5 TABLET ORAL AS NEEDED
Status: DISCONTINUED | OUTPATIENT
Start: 2022-05-06 | End: 2022-05-06 | Stop reason: HOSPADM

## 2022-05-06 RX ORDER — SODIUM CHLORIDE 0.9 % (FLUSH) 0.9 %
5-40 SYRINGE (ML) INJECTION EVERY 8 HOURS
Status: DISCONTINUED | OUTPATIENT
Start: 2022-05-06 | End: 2022-05-06 | Stop reason: HOSPADM

## 2022-05-06 RX ORDER — ROCURONIUM BROMIDE 10 MG/ML
INJECTION, SOLUTION INTRAVENOUS AS NEEDED
Status: DISCONTINUED | OUTPATIENT
Start: 2022-05-06 | End: 2022-05-06 | Stop reason: HOSPADM

## 2022-05-06 RX ORDER — PROPOFOL 10 MG/ML
INJECTION, EMULSION INTRAVENOUS AS NEEDED
Status: DISCONTINUED | OUTPATIENT
Start: 2022-05-06 | End: 2022-05-06 | Stop reason: HOSPADM

## 2022-05-06 RX ORDER — MIDAZOLAM HYDROCHLORIDE 1 MG/ML
1 INJECTION, SOLUTION INTRAMUSCULAR; INTRAVENOUS AS NEEDED
Status: DISCONTINUED | OUTPATIENT
Start: 2022-05-06 | End: 2022-05-06 | Stop reason: HOSPADM

## 2022-05-06 RX ORDER — LIDOCAINE HYDROCHLORIDE 20 MG/ML
INJECTION, SOLUTION EPIDURAL; INFILTRATION; INTRACAUDAL; PERINEURAL AS NEEDED
Status: DISCONTINUED | OUTPATIENT
Start: 2022-05-06 | End: 2022-05-06 | Stop reason: HOSPADM

## 2022-05-06 RX ORDER — SODIUM CHLORIDE, SODIUM LACTATE, POTASSIUM CHLORIDE, CALCIUM CHLORIDE 600; 310; 30; 20 MG/100ML; MG/100ML; MG/100ML; MG/100ML
100 INJECTION, SOLUTION INTRAVENOUS CONTINUOUS
Status: DISCONTINUED | OUTPATIENT
Start: 2022-05-06 | End: 2022-05-06 | Stop reason: HOSPADM

## 2022-05-06 RX ORDER — SODIUM CHLORIDE 9 MG/ML
25 INJECTION, SOLUTION INTRAVENOUS CONTINUOUS
Status: DISCONTINUED | OUTPATIENT
Start: 2022-05-06 | End: 2022-05-06 | Stop reason: HOSPADM

## 2022-05-06 RX ORDER — LIDOCAINE HYDROCHLORIDE 10 MG/ML
0.1 INJECTION, SOLUTION EPIDURAL; INFILTRATION; INTRACAUDAL; PERINEURAL AS NEEDED
Status: DISCONTINUED | OUTPATIENT
Start: 2022-05-06 | End: 2022-05-06 | Stop reason: HOSPADM

## 2022-05-06 RX ORDER — ACETAMINOPHEN 325 MG/1
650 TABLET ORAL ONCE
Status: DISCONTINUED | OUTPATIENT
Start: 2022-05-06 | End: 2022-05-06 | Stop reason: HOSPADM

## 2022-05-06 RX ORDER — HYDROMORPHONE HYDROCHLORIDE 1 MG/ML
0.2 INJECTION, SOLUTION INTRAMUSCULAR; INTRAVENOUS; SUBCUTANEOUS
Status: DISCONTINUED | OUTPATIENT
Start: 2022-05-06 | End: 2022-05-06 | Stop reason: HOSPADM

## 2022-05-06 RX ORDER — HEPARIN SODIUM 200 [USP'U]/100ML
INJECTION, SOLUTION INTRAVENOUS
Status: COMPLETED | OUTPATIENT
Start: 2022-05-06 | End: 2022-05-06

## 2022-05-06 RX ORDER — MIDAZOLAM HYDROCHLORIDE 1 MG/ML
0.5 INJECTION, SOLUTION INTRAMUSCULAR; INTRAVENOUS
Status: DISCONTINUED | OUTPATIENT
Start: 2022-05-06 | End: 2022-05-06 | Stop reason: HOSPADM

## 2022-05-06 RX ORDER — ROPIVACAINE HYDROCHLORIDE 5 MG/ML
30 INJECTION, SOLUTION EPIDURAL; INFILTRATION; PERINEURAL AS NEEDED
Status: DISCONTINUED | OUTPATIENT
Start: 2022-05-06 | End: 2022-05-06 | Stop reason: HOSPADM

## 2022-05-06 RX ORDER — FENTANYL CITRATE 50 UG/ML
25 INJECTION, SOLUTION INTRAMUSCULAR; INTRAVENOUS
Status: DISCONTINUED | OUTPATIENT
Start: 2022-05-06 | End: 2022-05-06 | Stop reason: HOSPADM

## 2022-05-06 RX ORDER — DIPHENHYDRAMINE HYDROCHLORIDE 50 MG/ML
12.5 INJECTION, SOLUTION INTRAMUSCULAR; INTRAVENOUS AS NEEDED
Status: DISCONTINUED | OUTPATIENT
Start: 2022-05-06 | End: 2022-05-06 | Stop reason: HOSPADM

## 2022-05-06 RX ORDER — MORPHINE SULFATE 2 MG/ML
2 INJECTION, SOLUTION INTRAMUSCULAR; INTRAVENOUS
Status: DISCONTINUED | OUTPATIENT
Start: 2022-05-06 | End: 2022-05-06 | Stop reason: HOSPADM

## 2022-05-06 RX ORDER — BUPIVACAINE HYDROCHLORIDE 2.5 MG/ML
INJECTION, SOLUTION EPIDURAL; INFILTRATION; INTRACAUDAL AS NEEDED
Status: DISCONTINUED | OUTPATIENT
Start: 2022-05-06 | End: 2022-05-06 | Stop reason: HOSPADM

## 2022-05-06 RX ORDER — PHENYLEPHRINE HCL IN 0.9% NACL 0.4MG/10ML
SYRINGE (ML) INTRAVENOUS AS NEEDED
Status: DISCONTINUED | OUTPATIENT
Start: 2022-05-06 | End: 2022-05-06 | Stop reason: HOSPADM

## 2022-05-06 RX ORDER — PROTAMINE SULFATE 10 MG/ML
INJECTION, SOLUTION INTRAVENOUS AS NEEDED
Status: DISCONTINUED | OUTPATIENT
Start: 2022-05-06 | End: 2022-05-06 | Stop reason: HOSPADM

## 2022-05-06 RX ORDER — SODIUM CHLORIDE 9 MG/ML
75 INJECTION, SOLUTION INTRAVENOUS CONTINUOUS
Status: DISCONTINUED | OUTPATIENT
Start: 2022-05-06 | End: 2022-05-07 | Stop reason: HOSPADM

## 2022-05-06 RX ORDER — DEXAMETHASONE SODIUM PHOSPHATE 4 MG/ML
INJECTION, SOLUTION INTRA-ARTICULAR; INTRALESIONAL; INTRAMUSCULAR; INTRAVENOUS; SOFT TISSUE AS NEEDED
Status: DISCONTINUED | OUTPATIENT
Start: 2022-05-06 | End: 2022-05-06 | Stop reason: HOSPADM

## 2022-05-06 RX ORDER — SUCCINYLCHOLINE CHLORIDE 20 MG/ML
INJECTION INTRAMUSCULAR; INTRAVENOUS AS NEEDED
Status: DISCONTINUED | OUTPATIENT
Start: 2022-05-06 | End: 2022-05-06 | Stop reason: HOSPADM

## 2022-05-06 RX ORDER — FENTANYL CITRATE 50 UG/ML
INJECTION, SOLUTION INTRAMUSCULAR; INTRAVENOUS AS NEEDED
Status: DISCONTINUED | OUTPATIENT
Start: 2022-05-06 | End: 2022-05-06 | Stop reason: HOSPADM

## 2022-05-06 RX ORDER — ONDANSETRON 2 MG/ML
4 INJECTION INTRAMUSCULAR; INTRAVENOUS AS NEEDED
Status: DISCONTINUED | OUTPATIENT
Start: 2022-05-06 | End: 2022-05-06 | Stop reason: HOSPADM

## 2022-05-06 RX ADMIN — FENTANYL CITRATE 50 MCG: 50 INJECTION, SOLUTION INTRAMUSCULAR; INTRAVENOUS at 19:23

## 2022-05-06 RX ADMIN — PHENYLEPHRINE HYDROCHLORIDE 30 MCG/MIN: 10 INJECTION INTRAVENOUS at 18:24

## 2022-05-06 RX ADMIN — FENTANYL CITRATE 50 MCG: 50 INJECTION, SOLUTION INTRAMUSCULAR; INTRAVENOUS at 18:51

## 2022-05-06 RX ADMIN — MINOXIDIL 5 MG: 2.5 TABLET ORAL at 09:52

## 2022-05-06 RX ADMIN — ROCURONIUM BROMIDE 10 MG: 10 SOLUTION INTRAVENOUS at 18:38

## 2022-05-06 RX ADMIN — AMLODIPINE BESYLATE 10 MG: 5 TABLET ORAL at 09:48

## 2022-05-06 RX ADMIN — FENTANYL CITRATE 50 MCG: 50 INJECTION, SOLUTION INTRAMUSCULAR; INTRAVENOUS at 18:14

## 2022-05-06 RX ADMIN — PROPOFOL 80 MG: 10 INJECTION, EMULSION INTRAVENOUS at 18:00

## 2022-05-06 RX ADMIN — CLOPIDOGREL BISULFATE 75 MG: 75 TABLET ORAL at 09:48

## 2022-05-06 RX ADMIN — FENTANYL CITRATE 50 MCG: 50 INJECTION, SOLUTION INTRAMUSCULAR; INTRAVENOUS at 18:00

## 2022-05-06 RX ADMIN — DEXAMETHASONE SODIUM PHOSPHATE 4 MG: 4 INJECTION, SOLUTION INTRAMUSCULAR; INTRAVENOUS at 18:10

## 2022-05-06 RX ADMIN — Medication 80 MCG: at 18:00

## 2022-05-06 RX ADMIN — PROTAMINE SULFATE 20 MG: 10 INJECTION, SOLUTION INTRAVENOUS at 18:56

## 2022-05-06 RX ADMIN — SODIUM CHLORIDE 75 ML/HR: 9 INJECTION, SOLUTION INTRAVENOUS at 21:08

## 2022-05-06 RX ADMIN — PROTAMINE SULFATE 20 MG: 10 INJECTION, SOLUTION INTRAVENOUS at 19:06

## 2022-05-06 RX ADMIN — LIDOCAINE HYDROCHLORIDE 80 MG: 20 INJECTION, SOLUTION EPIDURAL; INFILTRATION; INTRACAUDAL; PERINEURAL at 18:00

## 2022-05-06 RX ADMIN — ROCURONIUM BROMIDE 30 MG: 10 SOLUTION INTRAVENOUS at 18:10

## 2022-05-06 RX ADMIN — SODIUM CHLORIDE, PRESERVATIVE FREE 10 ML: 5 INJECTION INTRAVENOUS at 22:00

## 2022-05-06 RX ADMIN — SUCCINYLCHOLINE CHLORIDE 120 MG: 20 INJECTION, SOLUTION INTRAMUSCULAR; INTRAVENOUS at 18:01

## 2022-05-06 RX ADMIN — ONDANSETRON HYDROCHLORIDE 4 MG: 2 INJECTION, SOLUTION INTRAMUSCULAR; INTRAVENOUS at 18:10

## 2022-05-06 RX ADMIN — CEFAZOLIN 2 G: 330 INJECTION, POWDER, FOR SOLUTION INTRAMUSCULAR; INTRAVENOUS at 18:10

## 2022-05-06 RX ADMIN — ROCURONIUM BROMIDE 10 MG: 10 SOLUTION INTRAVENOUS at 18:00

## 2022-05-06 RX ADMIN — SODIUM CHLORIDE, POTASSIUM CHLORIDE, SODIUM LACTATE AND CALCIUM CHLORIDE 25 ML/HR: 600; 310; 30; 20 INJECTION, SOLUTION INTRAVENOUS at 17:34

## 2022-05-06 RX ADMIN — Medication 80 MCG: at 18:11

## 2022-05-06 RX ADMIN — HEPARIN SODIUM 10000 UNITS: 1000 INJECTION INTRAVENOUS; SUBCUTANEOUS at 18:20

## 2022-05-06 RX ADMIN — ASPIRIN 81 MG CHEWABLE TABLET 81 MG: 81 TABLET CHEWABLE at 09:48

## 2022-05-06 RX ADMIN — ATORVASTATIN CALCIUM 10 MG: 10 TABLET, FILM COATED ORAL at 09:48

## 2022-05-06 RX ADMIN — SUGAMMADEX 200 MG: 100 INJECTION, SOLUTION INTRAVENOUS at 19:20

## 2022-05-06 NOTE — PROGRESS NOTES
05/06/22 1704   Bowel Bundle Checklist   Wound Protector Used? N/A   Closing instruments isolated and used for closing fascia and skin? N/A   Sterile glove change by all team members prior to close? N/A   Gown changed by all team members prior to close?  N/A

## 2022-05-06 NOTE — ANESTHESIA POSTPROCEDURE EVALUATION
Procedure(s):  CAROTID ENDARTECTOMY). general    Anesthesia Post Evaluation      Multimodal analgesia: multimodal analgesia used between 6 hours prior to anesthesia start to PACU discharge  Patient location during evaluation: bedside  Patient participation: complete - patient participated  Level of consciousness: awake  Pain score: 0  Pain management: satisfactory to patient  Airway patency: patent  Anesthetic complications: no  Cardiovascular status: acceptable and blood pressure returned to baseline  Respiratory status: acceptable  Hydration status: acceptable  Comments: I have evaluated the patient and meets criteria for discharge from PACU. Elva Joseph DO. Post anesthesia nausea and vomiting:  none  Final Post Anesthesia Temperature Assessment:  Normothermia (36.0-37.5 degrees C)      INITIAL Post-op Vital signs:   Vitals Value Taken Time   /65 05/06/22 1950   Temp     Pulse 87 05/06/22 1954   Resp 17 05/06/22 1954   SpO2 97 % 05/06/22 1954   Vitals shown include unvalidated device data.

## 2022-05-06 NOTE — ANESTHESIA PREPROCEDURE EVALUATION
Anesthetic History   No history of anesthetic complications            Review of Systems / Medical History  Patient summary reviewed, nursing notes reviewed and pertinent labs reviewed    Pulmonary  Within defined limits                 Neuro/Psych       CVA  Psychiatric history     Cardiovascular    Hypertension          Hyperlipidemia      Comments: Echo 4/2022    Left Ventricle: Normal left ventricular systolic function with a visually estimated EF of 55 - 60%. Left ventricle size is normal. Normal wall thickness. Normal wall motion.   Left Atrium: Left atrium is mildly dilated.   Interatrial Septum: Agitated saline study was negative with and without provocation. GI/Hepatic/Renal         Renal disease       Endo/Other        Arthritis     Other Findings   Comments: Recent CVA (9 days ago, 4/28/22), has TPA, had resolution of symptoms but still has mild right-sided weakness and aphasia         Physical Exam    Airway  Mallampati: II  TM Distance: > 6 cm  Neck ROM: normal range of motion   Mouth opening: Normal     Cardiovascular  Regular rate and rhythm,  S1 and S2 normal,  no murmur, click, rub, or gallop             Dental  No notable dental hx       Pulmonary  Breath sounds clear to auscultation               Abdominal  GI exam deferred       Other Findings            Anesthetic Plan    ASA: 3  Anesthesia type: general    Monitoring Plan: Arterial line      Induction: Intravenous  Anesthetic plan and risks discussed with: Patient and Son / Daughter      Discussed temporary reversal of DNR for the perioperative period. Patient and patient's daughter understand and agree to proceed.

## 2022-05-06 NOTE — ROUTINE PROCESS
TRANSFER - IN REPORT:    Verbal report received from [facility] RN(name) on Joseph Zuleta  being received from [facility](unit) for ordered procedure      Report consisted of patients Situation, Background, Assessment and   Recommendations(SBAR). Information from the following report(s) SBAR, Kardex, ED Summary, Procedure Summary, Intake/Output, MAR and Recent Results was reviewed with the receiving nurse. Opportunity for questions and clarification was provided. Assessment completed upon patients arrival to unit and care assumed.

## 2022-05-06 NOTE — PROGRESS NOTES
Occupational Therapy   [phone]    Chart reviewed in prep for OT evaluation. Noted patient being prepped for L TCAR today. Will hold and re-evaluate POD #1 as able and medically appropriate. Thank you. Joan Shen MS, OTR/L

## 2022-05-06 NOTE — PROGRESS NOTES
VIMAL:  1. RUR-11%  2. [facility] IPR when medically stable. 3. Transport- family vs BLS. 4. OR today at 3:00 p.m.      CM informed Fran Delcid at [facility] [phone], to inform her of the above information. She will continue to follow.       Clifton Ledezma, [facility]

## 2022-05-06 NOTE — ANESTHESIA PROCEDURE NOTES
Arterial Line Placement    Performed by: Sabino Maldonado DO  Authorized by: Romana Canas DO     Pre-Procedure  Indications:  Arterial pressure monitoring and blood sampling  Preanesthetic Checklist: patient identified, risks and benefits discussed, anesthesia consent, site marked, patient being monitored, timeout performed and patient being monitored      Procedure:   Prep:  ChloraPrep  Seldinger Technique?: Yes    Orientation:  Right  Location:  Radial artery  Catheter size:  20 G  Number of attempts:  2    Assessment:   Post-procedure:  Line secured and sterile dressing applied  Patient Tolerance:  Patient tolerated the procedure well with no immediate complications  Comment:   Collateral perfusion verified

## 2022-05-06 NOTE — PROGRESS NOTES
[facility]  Hospitalist Group                                                                                          Hospitalist Progress Note  Bari Casillas MD  Answering service: [phone] from in house phone        Date of Service:  2022  NAME:  Divine Spence  :  1940  MRN:  [id number]      Admission Summary:   Copied form admit: \" Santo Carmona a 79 y. o. with a past history of HTN, hypercholesterolemia, osteoarthritis who was admitted on 2022 from home via EMS after experiencing R sided weakness and aphasia. Given TPA due to concern for stroke. And then admitted to ICU after tpa.   Found to have thrombus causing severe stenosis of L ICA. Neuro IS consulted. Recommended vascular surgen eval for carotid endarterectomy, no endovascular intervention. MRI brain showing numerous acute/subacute nonhemorrhagic ischemia throughout L MCA territory. Able to work w/ therapy  (poor command following) and placed on puree diet w/ thin liquids but more lethargic today, not very arousable so changed from Plavix and ASA to heparin ggt. Palliative consulted, and family members decide  transition to comfort care \"    Interval history / Subjective:     Seen and examined at bedside earlier. No acute complaints, feel well, plan for  L TCAR today      Assessment & Plan:       Assessment and Plan:     1. Acute. L MCA infarcts : s/p tpa   Brain MRI: Numerous foci of acute or subacute nonhemorrhagic ischemia throughout the  left middle cerebral artery territory and left basal ganglia regions  - palliative following, cont fulll measures at this time, no longer comfort however patient now noted to be DNR     2. severe > 95 stenosis of L ICA at origin by thrombus  -vascular following, LTCAR planned for       3. AMS/Acute encephalopathy due to cva  - resolving    4. Aphasia/dysphagia   - diet advanced to easy chew 5/3   -appreciate speech     5.  Hypokalemia   Replete prn    Appreciated palliative care involvement patient is DNR    dispo: 24-48 hrs IPR         Physical exam  General: No acute distress appears stated age  CVS: Regular rate and rhythm no murmurs gallops rubs  Pulm: Clear to auscultation bilaterally  Abdomen: Soft nontender nondistended positive bowel sounds  Extremities: No rash or lesion 2+ cap refill no peripheral extremity edema  Neuro: ANO x3 cranial nerves II to XII grossly intact      Hospital Problems  Never Reviewed          Codes Class Noted POA    * (Principal) CVA (cerebral vascular accident) [facility]) ICD-10-CM: I63.9  ICD-9-CM: 434.91  4/26/2022 Unknown                    Data Review:    Review and/or order of clinical lab test      Labs:     Recent Labs     05/05/22  0912   WBC 5.6   HGB 16.8   HCT 49.9        Recent Labs     05/05/22 0912      K 3.5      CO2 26   BUN 16   CREA 1.25   *   CA 9.6     No results for input(s): ALT, AP, TBIL, TBILI, TP, ALB, GLOB, GGT, AML, LPSE in the last 72 hours. No lab exists for component: SGOT, GPT, AMYP, HLPSE  No results for input(s): INR, PTP, APTT, INREXT, INREXT in the last 72 hours. No results for input(s): FE, TIBC, PSAT, FERR in the last 72 hours. No results found for: FOL, RBCF   No results for input(s): PH, PCO2, PO2 in the last 72 hours. No results for input(s): CPK, CKNDX, TROIQ in the last 72 hours.     No lab exists for component: CPKMB  Lab Results   Component Value Date/Time    Cholesterol, total 187 04/27/2022 04:28 AM    HDL Cholesterol 66 04/27/2022 04:28 AM    LDL, calculated 107.2 (H) 04/27/2022 04:28 AM    Triglyceride 69 04/27/2022 04:28 AM    CHOL/HDL Ratio 2.8 04/27/2022 04:28 AM     Lab Results   Component Value Date/Time    Glucose (POC) 97 04/26/2022 05:12 PM    Glucose (POC) 94 02/13/2018 07:20 AM     Lab Results   Component Value Date/Time    Color YELLOW/STRAW 01/29/2018 08:49 AM    Appearance CLEAR 01/29/2018 08:49 AM    Specific gravity 1.019 01/29/2018 08:49 AM    pH (UA) 6.0 01/29/2018 08:49 AM    Protein NEGATIVE  01/29/2018 08:49 AM    Glucose NEGATIVE  01/29/2018 08:49 AM    Ketone NEGATIVE  01/29/2018 08:49 AM    Bilirubin NEGATIVE  01/29/2018 08:49 AM    Urobilinogen 0.2 01/29/2018 08:49 AM    Nitrites NEGATIVE  01/29/2018 08:49 AM    Leukocyte Esterase NEGATIVE  01/29/2018 08:49 AM    Epithelial cells FEW 01/29/2018 08:49 AM    Bacteria NEGATIVE  01/29/2018 08:49 AM    WBC 0-4 01/29/2018 08:49 AM    RBC 0-5 01/29/2018 08:49 AM         Medications Reviewed:     Current Facility-Administered Medications   Medication Dose Route Frequency    minoxidiL (LONITEN) tablet 5 mg  5 mg Oral DAILY    enoxaparin (LOVENOX) injection 40 mg  40 mg SubCUTAneous Q24H    clopidogreL (PLAVIX) tablet 75 mg  75 mg Oral DAILY    aspirin chewable tablet 81 mg  81 mg Oral DAILY    atorvastatin (LIPITOR) tablet 10 mg  10 mg Oral DAILY    amLODIPine (NORVASC) tablet 10 mg  10 mg Oral DAILY    morphine (ROXANOL) 100 mg/5 mL (20 mg/mL) concentrated solution 10 mg  10 mg Oral Q3H PRN    LORazepam (INTENSOL) 2 mg/mL oral concentrate 1 mg  1 mg Oral Q4H PRN    glycopyrrolate (ROBINUL) injection 0.2 mg  0.2 mg IntraVENous Q4H PRN    scopolamine (TRANSDERM-SCOP) 1 mg over 3 days 1 Patch  1 Patch TransDERmal Q72H PRN    acetaminophen (TYLENOL) tablet 650 mg  650 mg Oral Q4H PRN    Or    acetaminophen (TYLENOL) solution 650 mg  650 mg Per NG tube Q4H PRN    Or    acetaminophen (TYLENOL) suppository 650 mg  650 mg Rectal Q4H PRN    ondansetron (ZOFRAN) injection 4 mg  4 mg IntraVENous Q6H PRN     ______________________________________________________________________  EXPECTED LENGTH OF STAY: 3d 4h  ACTUAL LENGTH OF STAY:          10                 eClsa Aguirre MD

## 2022-05-06 NOTE — PROGRESS NOTES
Problem: Mobility Impaired (Adult and Pediatric)  Goal: *Acute Goals and Plan of Care (Insert Text)  Description: FUNCTIONAL STATUS PRIOR TO ADMISSION: Pt aphasic, did not verbalize, unable to provide PLOF. Physical Therapy Goals  Revised 5/3/2022  1. Patient will move from supine to sit and sit to supine  in bed with minimal assistance within 7 day(s). 2.  Patient will transfer from bed to chair and chair to bed with minimal assistance using the least restrictive device within 7 day(s). 3.  Patient will perform sit to stand with minimal assistance within 7 day(s). 4.  Patient will ambulate with minimal assistance for 50 feet with the least restrictive device within 7 day(s). Physical Therapy Goals  Initiated 4/27/2022  1. Patient will move from supine to sit and sit to supine , scoot up and down, and roll side to side in bed with maximal assistance within 7 day(s). 2.  Patient will transfer from bed to chair and chair to bed with maximal assistance using the least restrictive device within 7 day(s). 3.  Patient will perform sit to stand with maximal assistance within 7 day(s). 4.  Patient will tolerate static sitting EOB x2 minutes with moderate assistance within 7 day(s). Outcome: Progressing Towards Goal   PHYSICAL THERAPY TREATMENT  Patient: Susana Stone (57 y.o. male)  Date: 5/6/2022  Diagnosis: CVA (cerebral vascular accident) ([facility] [address] [I63.9] CVA (cerebral vascular accident) ([facility] [address]  Procedure(s) (LRB):  TRANSCAROTID ARTERY REVASCULARIZATION (GETA-GENERAL ENDOTRACHEAL ANESTHESIA) (N/A)    Precautions: Fall (-170)  Chart, physical therapy assessment, plan of care and goals were reviewed. ASSESSMENT  Patient continues with skilled PT services and is progressing towards goals. Patient received in bed and most agreeable to therapy. Moving well managing bed mobility. Still limited by decreased motor control of RUE and RLE, decreased balance, gait, tolerance for activity.   Gait with right hand hold assist (light) with more of posterior lean, some minor retropulsion noted. .     Current Level of Function Impacting Discharge (mobility/balance): minimal assist for basic mobility, gait    Other factors to consider for discharge: far below PLOF of independent         PLAN :  Patient continues to benefit from skilled intervention to address the above impairments. Continue treatment per established plan of care. to address goals. Recommendation for discharge: (in order for the patient to meet his/her long term goals)  Therapy 3 hours per day 5-7 days per week    This discharge recommendation:  Has been made in collaboration with the attending provider and/or case management    IF patient discharges home will need the following DME: to be determined (TBD)       SUBJECTIVE:   Patient stated Dora briggs.     OBJECTIVE DATA SUMMARY:   Critical Behavior:  Neurologic State: Alert  Orientation Level: Oriented to person,Oriented to place,Oriented to situation,Oriented to time  Cognition: Memory loss  Safety/Judgement: Decreased awareness of environment,Decreased awareness of need for assistance,Decreased awareness of need for safety,Decreased insight into deficits,Fall prevention  Functional Mobility Training:  Bed Mobility:     Supine to Sit: Stand-by assistance; Additional time              Transfers:  Sit to Stand: Contact guard assistance  Stand to Sit: Contact guard assistance        Bed to Chair: Contact guard assistance                    Balance:  Sitting: Impaired; Without support  Sitting - Static: Good (unsupported)  Sitting - Dynamic: Fair (occasional)  Standing: Impaired; Without support  Standing - Static: Fair  Standing - Dynamic : Constant support; Fair    Single leg stance- unable on right; min on left   Eyes closed- steady x10 secs    Worked on reaching out of base of support, mainly reaching with LUE   Ambulation/Gait Training:  Distance (ft): 30 Feet (ft)  Assistive Device: Gait belt;Other (comment) (hand hold assist)  Ambulation - Level of Assistance: Minimal assistance        Gait Abnormalities: Decreased step clearance;Trunk sway increased        Base of Support: Center of gravity altered     Speed/Louisa: Slow  Step Length: Right shortened;Left shortened          Pain Ratin    Activity Tolerance:   Fair and requires rest breaks    After treatment patient left in no apparent distress:   Sitting in chair, Call bell within reach, Bed / chair alarm activated, and Caregiver / family present    COMMUNICATION/COLLABORATION:   The patients plan of care was discussed with: Registered nurse.      Nelly Jefferson, PT   Time Calculation: 23 mins

## 2022-05-07 VITALS
WEIGHT: 156.53 LBS | BODY MASS INDEX: 25.16 KG/M2 | SYSTOLIC BLOOD PRESSURE: 141 MMHG | OXYGEN SATURATION: 94 % | HEIGHT: 66 IN | RESPIRATION RATE: 19 BRPM | HEART RATE: 86 BPM | DIASTOLIC BLOOD PRESSURE: 66 MMHG | TEMPERATURE: 98.1 F

## 2022-05-07 LAB
ANION GAP SERPL CALC-SCNC: 7 MMOL/L (ref 5–15)
BASOPHILS # BLD: 0 K/UL (ref 0–0.1)
BASOPHILS NFR BLD: 0 % (ref 0–1)
BUN SERPL-MCNC: 19 MG/DL (ref 6–20)
BUN/CREAT SERPL: 16 (ref 12–20)
CALCIUM SERPL-MCNC: 9.5 MG/DL (ref 8.5–10.1)
CHLORIDE SERPL-SCNC: 110 MMOL/L (ref 97–108)
CO2 SERPL-SCNC: 22 MMOL/L (ref 21–32)
CREAT SERPL-MCNC: 1.18 MG/DL (ref 0.7–1.3)
DIFFERENTIAL METHOD BLD: ABNORMAL
EOSINOPHIL # BLD: 0 K/UL (ref 0–0.4)
EOSINOPHIL NFR BLD: 0 % (ref 0–7)
ERYTHROCYTE [DISTWIDTH] IN BLOOD BY AUTOMATED COUNT: 12.7 % (ref 11.5–14.5)
GLUCOSE SERPL-MCNC: 140 MG/DL (ref 65–100)
HCT VFR BLD AUTO: 45.3 % (ref 36.6–50.3)
HGB BLD-MCNC: 15.8 G/DL (ref 12.1–17)
IMM GRANULOCYTES # BLD AUTO: 0.1 K/UL (ref 0–0.04)
IMM GRANULOCYTES NFR BLD AUTO: 1 % (ref 0–0.5)
LYMPHOCYTES # BLD: 0.3 K/UL (ref 0.8–3.5)
LYMPHOCYTES NFR BLD: 3 % (ref 12–49)
MCH RBC QN AUTO: 31.6 PG (ref 26–34)
MCHC RBC AUTO-ENTMCNC: 34.9 G/DL (ref 30–36.5)
MCV RBC AUTO: 90.6 FL (ref 80–99)
MONOCYTES # BLD: 0.2 K/UL (ref 0–1)
MONOCYTES NFR BLD: 2 % (ref 5–13)
NEUTS SEG # BLD: 8.9 K/UL (ref 1.8–8)
NEUTS SEG NFR BLD: 94 % (ref 32–75)
NRBC # BLD: 0 K/UL (ref 0–0.01)
NRBC BLD-RTO: 0 PER 100 WBC
PLATELET # BLD AUTO: 170 K/UL (ref 150–400)
PMV BLD AUTO: 10.9 FL (ref 8.9–12.9)
POTASSIUM SERPL-SCNC: 3.6 MMOL/L (ref 3.5–5.1)
RBC # BLD AUTO: 5 M/UL (ref 4.1–5.7)
RBC MORPH BLD: ABNORMAL
SODIUM SERPL-SCNC: 139 MMOL/L (ref 136–145)
WBC # BLD AUTO: 9.5 K/UL (ref 4.1–11.1)

## 2022-05-07 PROCEDURE — 85025 COMPLETE CBC W/AUTO DIFF WBC: CPT

## 2022-05-07 PROCEDURE — 74011250636 HC RX REV CODE- 250/636: Performed by: INTERNAL MEDICINE

## 2022-05-07 PROCEDURE — 03CL0ZZ EXTIRPATION OF MATTER FROM LEFT INTERNAL CAROTID ARTERY, OPEN APPROACH: ICD-10-PCS | Performed by: SURGERY

## 2022-05-07 PROCEDURE — 74011250637 HC RX REV CODE- 250/637: Performed by: INTERNAL MEDICINE

## 2022-05-07 PROCEDURE — 80048 BASIC METABOLIC PNL TOTAL CA: CPT

## 2022-05-07 PROCEDURE — 36591 DRAW BLOOD OFF VENOUS DEVICE: CPT

## 2022-05-07 RX ORDER — CLOPIDOGREL BISULFATE 75 MG/1
75 TABLET ORAL DAILY
Qty: 30 TABLET | Refills: 0 | Status: SHIPPED
Start: 2022-05-07 | End: 2022-06-06

## 2022-05-07 RX ORDER — GUAIFENESIN 100 MG/5ML
81 LIQUID (ML) ORAL DAILY
Qty: 30 TABLET | Refills: 0 | Status: SHIPPED
Start: 2022-05-07 | End: 2022-06-06

## 2022-05-07 RX ADMIN — ENOXAPARIN SODIUM 40 MG: 100 INJECTION SUBCUTANEOUS at 12:26

## 2022-05-07 RX ADMIN — AMLODIPINE BESYLATE 10 MG: 5 TABLET ORAL at 09:01

## 2022-05-07 RX ADMIN — CLOPIDOGREL BISULFATE 75 MG: 75 TABLET ORAL at 09:01

## 2022-05-07 RX ADMIN — ATORVASTATIN CALCIUM 10 MG: 10 TABLET, FILM COATED ORAL at 09:01

## 2022-05-07 RX ADMIN — ASPIRIN 81 MG CHEWABLE TABLET 81 MG: 81 TABLET CHEWABLE at 09:02

## 2022-05-07 RX ADMIN — MINOXIDIL 5 MG: 2.5 TABLET ORAL at 09:01

## 2022-05-07 NOTE — PERIOP NOTES
TRANSFER - OUT REPORT    Verbal report given to ANDREY Mcqueen(name) on [facility]  being transferred to Room 665(unit) for routine post - op       Report consisted of patients Situation, Background, Assessment and   Recommendations(SBAR). Time Pre op antibiotic given:1810  Anesthesia Stop time: 1936  Anders Present on Transfer to floor:No  Order for Anders on Chart:No  Discharge Prescriptions with  Information from the following report(s) SBAR, Kardex, OR Summary, Intake/Output, MAR and Cardiac Rhythm Sinus Rhythm was reviewed with the receiving nurse. Opportunity for questions and clarification was provided. Is the patient on 02? NO       L/Min        Other      Is the patient on a monitor? NO    Is the nurse transporting with the patient? NO    Surgical Waiting Area notified of patient's transfer from PACU? YES      The following personal items collected during your admission accompanied patient upon transfer:   Dental Appliance: Dental Appliances: None  Vision: Visual Aid: At bedside,Glasses  Hearing Aid:    Jewelry: Jewelry: Ring (gold colored wedding band, unable to remove, taped and consent signed)  Clothing: Clothing: None (no nelongings to preop)  Other Valuables: Other Valuables: None  Valuables sent to safe: Personal Items Sent to Safe: none     Charting on pt by Jim Castillo RN and michael Howell RN. Error when pt arrived as to who was in chart.

## 2022-05-07 NOTE — PROGRESS NOTES
Problem: Aspiration - Risk of  Goal: *Absence of aspiration  Outcome: Progressing Towards Goal     Problem: Patient Education: Go to Patient Education Activity  Goal: Patient/Family Education  Outcome: Progressing Towards Goal     Problem: Pressure Injury - Risk of  Goal: *Prevention of pressure injury  Description: Document Den Scale and appropriate interventions in the flowsheet. Outcome: Progressing Towards Goal  Note: Pressure Injury Interventions:  Sensory Interventions: Assess changes in LOC,Discuss PT/OT consult with provider,Float heels,Keep linens dry and wrinkle-free,Minimize linen layers,Pad between skin to skin    Moisture Interventions: Absorbent underpads,Limit adult briefs,Minimize layers    Activity Interventions: Increase time out of bed,Pressure redistribution bed/mattress(bed type),PT/OT evaluation    Mobility Interventions: HOB 30 degrees or less,Pressure redistribution bed/mattress (bed type),PT/OT evaluation    Nutrition Interventions: Document food/fluid/supplement intake    Friction and Shear Interventions: Minimize layers                Problem: Patient Education: Go to Patient Education Activity  Goal: Patient/Family Education  Outcome: Progressing Towards Goal     Problem: Falls - Risk of  Goal: *Absence of Falls  Description: Document Jennifer Fall Risk and appropriate interventions in the flowsheet.   Outcome: Progressing Towards Goal  Note: Fall Risk Interventions:  Mobility Interventions: Bed/chair exit alarm,Communicate number of staff needed for ambulation/transfer,OT consult for ADLs,Patient to call before getting OOB,PT Consult for mobility concerns,PT Consult for assist device competence,Strengthening exercises (ROM-active/passive),Utilize walker, cane, or other assistive device,Utilize gait belt for transfers/ambulation    Mentation Interventions: Adequate sleep, hydration, pain control,Bed/chair exit alarm,Door open when patient unattended,Eyeglasses and hearing aids,Increase mobility,More frequent rounding,Reorient patient    Medication Interventions: Assess postural VS orthostatic hypotension,Bed/chair exit alarm,Evaluate medications/consider consulting pharmacy,Patient to call before getting OOB,Teach patient to arise slowly,Utilize gait belt for transfers/ambulation    Elimination Interventions: Bed/chair exit alarm,Call light in reach,Toileting schedule/hourly rounds,Toilet paper/wipes in reach,Urinal in reach              Problem: Patient Education: Go to Patient Education Activity  Goal: Patient/Family Education  Outcome: Progressing Towards Goal     Problem: Patient Education: Go to Patient Education Activity  Goal: Patient/Family Education  Outcome: Progressing Towards Goal     Problem: Patient Education: Go to Patient Education Activity  Goal: Patient/Family Education  Outcome: Progressing Towards Goal     Problem: Patient Education: Go to Patient Education Activity  Goal: Patient/Family Education  Outcome: Progressing Towards Goal     Problem: Patient Education: Go to Patient Education Activity  Goal: Patient/Family Education  Outcome: Progressing Towards Goal     Problem: Patient Education: Go to Patient Education Activity  Goal: Patient/Family Education  Outcome: Progressing Towards Goal     Problem: TIA/CVA Stroke: Day 2 Until Discharge  Goal: Off Pathway (Use only if patient is Off Pathway)  Outcome: Progressing Towards Goal  Goal: Activity/Safety  Outcome: Progressing Towards Goal  Goal: Diagnostic Test/Procedures  Outcome: Progressing Towards Goal  Goal: Nutrition/Diet  Outcome: Progressing Towards Goal  Goal: Discharge Planning  Outcome: Progressing Towards Goal  Goal: Medications  Outcome: Progressing Towards Goal  Goal: Respiratory  Outcome: Progressing Towards Goal  Goal: Treatments/Interventions/Procedures  Outcome: Progressing Towards Goal  Goal: Psychosocial  Outcome: Progressing Towards Goal  Goal: *Verbalizes anxiety and depression are reduced or absent  Outcome: Progressing Towards Goal  Goal: *Absence of aspiration  Outcome: Progressing Towards Goal  Goal: *Absence of deep venous thrombosis signs and symptoms(Stroke Metric)  Outcome: Progressing Towards Goal  Goal: *Optimal pain control at patient's stated goal  Outcome: Progressing Towards Goal  Goal: *Tolerating diet  Outcome: Progressing Towards Goal  Goal: *Ability to perform ADLs and demonstrates progressive mobility and function  Outcome: Progressing Towards Goal  Goal: *Stroke education continued(Stroke Metric)  Outcome: Progressing Towards Goal     Problem: Ischemic Stroke: Discharge Outcomes  Goal: *Verbalizes anxiety and depression are reduced or absent  Outcome: Progressing Towards Goal  Goal: *Verbalize understanding of risk factor modification(Stroke Metric)  Outcome: Progressing Towards Goal  Goal: *Hemodynamically stable  Outcome: Progressing Towards Goal  Goal: *Absence of aspiration pneumonia  Outcome: Progressing Towards Goal  Goal: *Aware of needed dietary changes  Outcome: Progressing Towards Goal  Goal: *Verbalize understanding of prescribed medications including anti-coagulants, anti-lipid, and/or anti-platelets(Stroke Metric)  Outcome: Progressing Towards Goal  Goal: *Tolerating diet  Outcome: Progressing Towards Goal  Goal: *Aware of follow-up diagnostics related to anticoagulants  Outcome: Progressing Towards Goal  Goal: *Ability to perform ADLs and demonstrates progressive mobility and function  Outcome: Progressing Towards Goal  Goal: *Absence of DVT(Stroke Metric)  Outcome: Progressing Towards Goal  Goal: *Absence of aspiration  Outcome: Progressing Towards Goal  Goal: *Optimal pain control at patient's stated goal  Outcome: Progressing Towards Goal  Goal: *Home safety concerns addressed  Outcome: Progressing Towards Goal  Goal: *Describes available resources and support systems  Outcome: Progressing Towards Goal  Goal: *Verbalizes understanding of activation of EMS(911) for stroke symptoms(Stroke Metric)  Outcome: Progressing Towards Goal  Goal: *Understands and describes signs and symptoms to report to providers(Stroke Metric)  Outcome: Progressing Towards Goal  Goal: *Neurolgocially stable (absence of additional neurological deficits)  Outcome: Progressing Towards Goal  Goal: *Verbalizes importance of follow-up with primary care physician(Stroke Metric)  Outcome: Progressing Towards Goal  Goal: *Smoking cessation discussed,if applicable(Stroke Metric)  Outcome: Progressing Towards Goal  Goal: *Depression screening completed(Stroke Metric)  Outcome: Progressing Towards Goal

## 2022-05-07 NOTE — PROGRESS NOTES
TRANSFER - OUT REPORT:    Verbal report given to Arely Austin (name) on Celester Armour  being transferred to [facility]. Report consisted of patients Situation, Background, Assessment and   Recommendations(SBAR). Information from the following report(s) SBAR, Kardex, Intake/Output, MAR, Recent Results and Med Rec Status was reviewed with the receiving nurse. Opportunity for questions and clarification was provided. Patient transported with:  Patient belongings. I have reviewed discharge instructions with the patient. The patient verbalized understanding. [facility]  for 1600.

## 2022-05-07 NOTE — OP NOTES
[address]  OPERATIVE REPORT    Name:  Ray Delcid  MR#:  [id number]  :  1940  ACCOUNT #:  [de-identified]  DATE OF SERVICE:  2022    PREOPERATIVE DIAGNOSIS:  Symptomatic left carotid artery stenosis. POSTOPERATIVE DIAGNOSIS:  Symptomatic left carotid artery stenosis. PROCEDURE PERFORMED:  Left carotid endarterectomy. SURGEON:  Santo Gomez MD    ASSISTANT:  .    ANESTHESIA:  General.    COMPLICATIONS:  None. SPECIMENS REMOVED:  None. IMPLANTS:  None. ESTIMATED BLOOD LOSS:  50 mL. OPERATIVE INDICATIONS:  The patient is an 79-year-old gentleman who was previously healthy and presented with a fairly impressive left hemispheric stroke. He had profound symptoms and was found to have a symptomatic left carotid artery stenosis. Given his profound symptoms, the plan was conservative therapy; however, he bounced back fairly quickly from his stroke and decision was made to proceed with carotid endarterectomy for secured stroke prevention. Prior to the procedure, the nature of procedure as well as the risks and benefits were explained to the patient in detail. He elected to proceed. PROCEDURE:  The patient was identified in the holding area. His left neck was marked. Consents were signed. The patient was brought back to the operating room where general anesthesia was induced. The left neck was prepped and draped in the usual standard fashion. Time-out was then performed. I began the procedure by making a transverse incision in the left neck. Dissection was carried down through the soft tissues and the platysma muscle. The sternocleidomastoid muscles were reflected laterally and the internal jugular vein was identified. The facial vein was crossing and was ligated. I then deepened my dissection. The common carotid artery was dissected free and looped. The patient was then systemically heparinized with 10,000 units of IV heparin.   While that was circulating, I carried my dissection distally with care not to disturb the bulb. I was able to get the internal carotid artery fairly distal beyond the stenosis and this was looped. The external carotid artery was then dissected away from the bulb and looped. Of note, the patient had a significant hypoglossal that had a very low trajectory that I was able to safely keep out of my field. I then clamped the carotid vessels, opened it with an 11 blade and extended with Butler scissors. I then used a #10 [place] shunt and placed into the internal carotid artery, and allowed it to backbleed. I then inserted into the common carotid artery. I secured the shunt with a loop and a Roly shunt clamp in the internal carotid artery. I then proceeded to inspect the internal carotid artery. There was a significant hemorrhagic plaque with a large amount of soft plaque. There was a large calcified plaque underlying this. I was able to perform a standard endarterectomy and I feathered fairly nicely in the distal endpoint. Care was taken to inspect the endarterectomy site that no debris was left behind. Once I was satisfied, irrigated the wound. Given the large vessel of the internal carotid artery, I elected to primarily close it. I used a 6-0 Prolene and sutured from each end of the arteriotomy and met in the middle. Just prior to tying this down, I removed the shunt and flushed all the vessels. I then tied down the suture line. I released all the clamps in standard fashion, flushing all debris into the external carotid artery. Upon releasing the clamps, the suture line was hemostatic. One repair suture was needed ultimately at the end of the internal carotid artery closer. I then listened with the Doppler signal and there was a great Doppler signal with continuous diastolic flow.   Satisfied with this, I again gave the patient 20 mg of protamine, subsequently 10 minutes later I gave another 20 mg of protamine as he was fairly oozy on aspirin and Plavix. Once I was satisfied with hemostasis, I irrigated the wounds, closed the deep layers with a 3-0 Vicryl and then a running Monocryl suture. Marcaine was added for postoperative analgesia and skin glue was attached. I did place a flat Mario Alberto drain prior to closing to a small counterincision at the base of the neck and this was secured to the skin. At the completion of procedure, all needle, sponge, and instrument counts were correct x2. The patient tolerated the procedure well and woke up from the anesthesia. He was moving all 4 extremities when he got to the recovery room.       MD ELMA Rivas/SARATH_NOHEMY_COLLIN/BC_MIL  D:  05/06/2022 21:14  T:  05/07/2022 5:47  JOB #:  [id number]

## 2022-05-07 NOTE — PROGRESS NOTES
TRANSFER - IN REPORT:    Verbal report received from Jovita(name) on [address]  being received from [facility](unit) for routine progression of care      Report consisted of patients Situation, Background, Assessment and   Recommendations(SBAR). Information from the following report(s) SBAR, Kardex, OR Summary and Procedure Summary was reviewed with the receiving nurse. Opportunity for questions and clarification was provided. Assessment completed upon patients arrival to unit and care assumed. 2230 Daughter Alejandro up in room as well pt orented to unit and resting  0000 No complaints of pain  0200 Patietn resting  0400 Drain had 20 cc sanguinous.   Provided with gingerale no complaints

## 2022-05-07 NOTE — PROGRESS NOTES
Transition of Care Plan: Katarzyna  RN to call report to [phone]    RUR: 12%    PCP F/U: Emanuel Weldon MD    Disposition: [facility]-room 100    Transportation: [facility]-4pm    Main Contact: ER Contact: Daughter Yvette Gutierrez [phone]    8671: Telephone call to Angelique at [facility]. May be able to accept today. States she will let this CM know. 1202: Facility can accept today. RN and MD notified. Left message for daughter to return call. 1225: Spoke with daughter Alejandro.  Notified of patient's discharge to [facility] at [address], RN, CRM

## 2022-05-07 NOTE — PERIOP NOTES
Initial NIH scale low as pt still under anesthesia. Minimal speech at this time therefore unable to answer questions or read. 2055 Pt now more alert . Can read and answer questions appropriately. Weak  (R) hand but pt can keep arm elevated without drift. Difficult for pt to straighten fingers (R) hand and pt does have mild expressive aphasia.

## 2022-05-07 NOTE — PROGRESS NOTES
Looks great, didn't sleep well but no pain, no neuro complaints  Vitals stable  Left neck flat and soft  Neuro with mild right hand weakness, unchanged from preop    78 y/o WM with symptomatic Left ICA stenosis, s/p CEA 5/6  - Doing well. Continue asa/plavix. PT/OT. - BP controlled  - Ok for discharge from my standpoint once mobile and dispo finalized. - Followup with me in 2-3 weeks after discharge.

## 2022-05-07 NOTE — DISCHARGE SUMMARY
Discharge Summary       PATIENT ID: Divine Spence  MRN: [id number]   YOB: 1940    DATE OF ADMISSION: 4/26/2022  5:09 PM    DATE OF DISCHARGE: 05/07/22   PRIMARY CARE PROVIDER: Skyler Gray MD     ATTENDING PHYSICIAN: Bari Casillas MD  DISCHARGING PROVIDER: Bari Casillas MD    To contact this individual call [phone] and ask the  to page. If unavailable ask to be transferred the Adult Hospitalist Department. CONSULTATIONS: IP CONSULT TO NEUROLOGY  IP CONSULT TO NEUROINTERVENTIONAL SURGERY  IP CONSULT TO PALLIATIVE CARE - PROVIDER  IP CONSULT TO VASCULAR SURGERY    PROCEDURES/SURGERIES: Procedure(s):  CAROTID ENDARTECTOMY)    ADMITTING DIAGNOSES & HOSPITAL COURSE:   Copied form admit: \" Santo Carmona a 79 y. o. with a past history of HTN, hypercholesterolemia, osteoarthritis who was admitted on 4/26/2022 from home via EMS after experiencing R sided weakness and aphasia. Given TPA due to concern for stroke. And then admitted to ICU after tpa.   Found to have thrombus causing severe stenosis of L ICA. Neuro IS consulted. Recommended vascular surgen eval for carotid endarterectomy, no endovascular intervention. MRI brain showing numerous acute/subacute nonhemorrhagic ischemia throughout L MCA territory. Able to work w/ therapy 4/27 (poor command following) and placed on puree diet w/ thin liquids but more lethargic today, not very arousable so changed from Plavix and ASA to heparin ggt.   Palliative consulted, and family members decide  transition to comfort care \"    Patient was managed for acute left MCA infarct status post tPA given 4/26. TTE neg for PFO. Initially the patient's acute encephalopathy had worsened and palliative care was consulted and there was initial consideration for hospice however patient clinically improved and this per family was no longer consideration and want to continue full measures but patient made DNR.   Noted greater than 95% stenosis of left ICA as well on stroke work-up vascular was consulted and following patient underwent carotid endarterectomy 5/6 with no acute complications. Patient currently mentating at baseline and tolerated diet well, was followed by speech. PT OT evaluated patient recommended IPR patient and family in agreement. Patient to continue aspirin Plavix statin. Recommend follow-up with outpatient PCP, neuro, vascular surgery. D/c to IPR. Daughter updated over the phone on plan. DISCHARGE DIAGNOSES / PLAN:        1. Acute. L MCA infarcts : s/p tpa 4/26  Brain MRI: Numerous foci of acute or subacute nonhemorrhagic ischemia throughout the  left middle cerebral artery territory and left basal ganglia regions  - palliative following, cont fulll measures at this time, no longer comfort however patient now noted to be DNR   -Continue aspirin Plavix statin     2. severe > 95 stenosis of L ICA at origin by thrombus  -vascular following,  status post CEA 5/6  -Continue aspirin Plavix and statin     3. AMS/Acute encephalopathy due to cva  - resolving     4. Aphasia/dysphagia   - diet advanced to easy chew 5/3   -appreciate speech      5.  Hypokalemia   Replete prn      Appreciated palliative care involvement patient is DNR     dispo: 24-48 hrs IPR        FOLLOW UP APPOINTMENTS:    Follow-up Information     Follow up With Specialties Details Why Houston Garcia MD Internal Medicine Physician In 3 days  [address] [facility] [address] [facility] [address] [facility] [address]  [phone]      Lori Ruiz MD Vascular Surgery, General Surgery In 1 week  [address] [facility] [address]      Anna Burnette MD Neurology In 1 week  [address] [facility]  [phone]             ADDITIONAL CARE RECOMMENDATIONS: Repeat CBC, BMP 3 to 5 days    DIET: Resume previous diet    ACTIVITY: Activity as tolerated      DISCHARGE MEDICATIONS:  Current Discharge Medication List      START taking these medications Details   clopidogreL (PLAVIX) 75 mg tab Take 1 Tablet by mouth daily for 30 days. Qty: 30 Tablet, Refills: 0  Start date: 5/7/2022, End date: 6/6/2022      aspirin 81 mg chewable tablet Take 1 Tablet by mouth daily for 30 days. Qty: 30 Tablet, Refills: 0  Start date: 5/7/2022, End date: 6/6/2022         CONTINUE these medications which have NOT CHANGED    Details   acetaminophen (TYLENOL) 500 mg tablet Take 1 Tab by mouth every four (4) hours (while awake). Schedule for pain control over the next 7-14 days. Do not exceed 3000 mg in 24 hours. Obtain over-the-counter. Indications: Postoperative Incisional Knee Pain  Qty: 60 Tab, Refills: 0      oxyCODONE IR (ROXICODONE) 5 mg immediate release tablet Take 0.5-1 Tabs by mouth every four (4) hours as needed (Severe pain not relieved by tramadol (Ultram). Take with food & fluids. ). Max Daily Amount: 30 mg. Indications: Severe Incisional Knee Pain  Qty: 20 Tab, Refills: 0    Associated Diagnoses: Primary localized osteoarthritis of right knee      traMADol (ULTRAM) 50 mg tablet Take 0.5-1 Tabs by mouth every six (6) hours as needed. Max Daily Amount: 200 mg. Indications: Postoperative Incisional Knee Pain  Qty: 60 Tab, Refills: 0    Associated Diagnoses: Primary localized osteoarthritis of right knee      potassium chloride (K-DUR, KLOR-CON) 20 mEq tablet Take 40 mEq by mouth daily. atorvastatin (LIPITOR) 10 mg tablet Take 10 mg by mouth daily. amlodipine (NORVASC) 10 mg tablet Take 10 mg by mouth daily. hydrochlorothiazide (HYDRODIURIL) 25 mg tablet Take 25 mg by mouth daily. STOP taking these medications       aspirin delayed-release (ASPIR-81) 81 mg tablet Comments:   Reason for Stopping:                 NOTIFY YOUR PHYSICIAN FOR ANY OF THE FOLLOWING:   Fever over 101 degrees for 24 hours. Chest pain, shortness of breath, fever, chills, nausea, vomiting, diarrhea, change in mentation, falling, weakness, bleeding.  Severe pain or pain not relieved by medications. Or, any other signs or symptoms that you may have questions about.     DISPOSITION:    Home With:   OT  PT  HH  RN      x Long term SNF/Inpatient Rehab    Independent/assisted living    Hospice    Other:       PATIENT CONDITION AT DISCHARGE:     Functional status    Poor    x Deconditioned     Independent      Cognition     Lucid    x Forgetful     Dementia      Code status     Full code    x DNR      PHYSICAL EXAMINATION AT DISCHARGE:  Physical exam  General: No acute distress appears stated age  CVS: Regular rate and rhythm no murmurs gallops rubs  Pulm: Clear to auscultation bilaterally  Abdomen: Soft nontender nondistended positive bowel sounds  Extremities: No rash or lesion 2+ cap refill no peripheral extremity edema  Neuro: ANO x3 cranial nerves II to XII grossly intact         CHRONIC MEDICAL DIAGNOSES:  Problem List as of 5/7/2022 Never Reviewed          Codes Class Noted - Resolved    * (Principal) CVA (cerebral vascular accident) ([facility] [address] ICD-10-CM: I63.9  ICD-9-CM: 434.91  4/26/2022 - Present        Primary localized osteoarthritis of right knee ICD-10-CM: M17.11  ICD-9-CM: 715.16  2/13/2018 - Present              Greater than 30 minutes were spent with the patient on counseling and coordination of care    Signed:   Amna Swenson MD  5/7/2022  2:20 PM

## 2022-05-07 NOTE — BRIEF OP NOTE
Brief Postoperative Note    Patient: Joseph Zuleta  YOB: 1940  MRN: [id number]    Date of Procedure: 5/6/2022     Pre-Op Diagnosis: Left CAROTID STENOSIS    Post-Op Diagnosis: Same as preoperative diagnosis. Procedure(s):  Left CAROTID ENDARTECTOMY)    Surgeon(s):  Stacy Alanis MD    Surgical Assistant: Surg Asst-1: Sheryl Wilde    Anesthesia: General     Estimated Blood Loss (mL): less than 50     Complications: None    Specimens: * No specimens in log *     Implants: * No implants in log *    Drains:   Walter-Bowers Drain 05/06/22 Anterior; Left Neck (Active)   Dressing Status Clean, dry, & intact 05/06/22 1945   Status Charged 05/06/22 1945   Drainage Color Sanguinous 05/06/22 1945       [REMOVED] Condom Catheter 04/27/22 (Removed)   Indications for Use Comfort for end of life care, if needed 05/03/22 0905   Status Draining 05/03/22 0905   Site Condition No abnormalities 05/03/22 0905   Drainage Tube Clipped to Bed Yes 05/03/22 0905   Catheter Secured to Thigh No 05/03/22 0905   Tamper Seal Intact Yes 05/03/22 0905   Bag Below Bladder/Not on Floor Yes 05/03/22 0905   Lack of Dependent Loop in Tubing Yes 05/03/22 0905   Drainage Bag Less Than Half Full Yes 05/03/22 5507   Sterile Solution Used for  Irrigation N/A 05/03/22 0905   Urine Output (mL) 500 ml 05/03/22 1222       [REMOVED] External Urinary Catheter 04/27/22 (Removed)   Site Assessment Clean, dry, & intact 04/27/22 1629   Repositioned No 04/27/22 1629   Perineal Care Yes 04/27/22 1629   Urine Output (mL) 625 ml 04/27/22 1904       Findings: Soft plaque    Electronically Signed by Ju Ronquillo MD on 5/6/2022 at 8:12 PM

## 2022-05-09 ENCOUNTER — PATIENT OUTREACH (OUTPATIENT)
Dept: CASE MANAGEMENT | Age: 82
End: 2022-05-09

## 2022-05-17 ENCOUNTER — PATIENT OUTREACH (OUTPATIENT)
Dept: CASE MANAGEMENT | Age: 82
End: 2022-05-17

## 2022-05-20 ENCOUNTER — HOSPITAL ENCOUNTER (INPATIENT)
Age: 82
LOS: 2 days | Discharge: HOME | DRG: 393 | End: 2022-05-22
Attending: STUDENT IN AN ORGANIZED HEALTH CARE EDUCATION/TRAINING PROGRAM | Admitting: HOSPITALIST
Payer: MEDICARE

## 2022-05-20 ENCOUNTER — ANESTHESIA EVENT (OUTPATIENT)
Dept: ENDOSCOPY | Age: 82
DRG: 393 | End: 2022-05-20
Payer: MEDICARE

## 2022-05-20 ENCOUNTER — ANESTHESIA (OUTPATIENT)
Dept: ENDOSCOPY | Age: 82
DRG: 393 | End: 2022-05-20
Payer: MEDICARE

## 2022-05-20 DIAGNOSIS — R13.10 DYSPHAGIA, UNSPECIFIED TYPE: Primary | ICD-10-CM

## 2022-05-20 PROBLEM — T18.128A FOOD IMPACTION OF ESOPHAGUS: Status: ACTIVE | Noted: 2022-05-20

## 2022-05-20 LAB
ALBUMIN SERPL-MCNC: 3.8 G/DL (ref 3.5–5)
ALBUMIN/GLOB SERPL: 1.2 {RATIO} (ref 1.1–2.2)
ALP SERPL-CCNC: 96 U/L (ref 45–117)
ALT SERPL-CCNC: 47 U/L (ref 12–78)
ANION GAP SERPL CALC-SCNC: 6 MMOL/L (ref 5–15)
AST SERPL-CCNC: 27 U/L (ref 15–37)
BASOPHILS # BLD: 0 K/UL (ref 0–0.1)
BASOPHILS NFR BLD: 0 % (ref 0–1)
BILIRUB SERPL-MCNC: 1.1 MG/DL (ref 0.2–1)
BUN SERPL-MCNC: 15 MG/DL (ref 6–20)
BUN/CREAT SERPL: 11 (ref 12–20)
CALCIUM SERPL-MCNC: 11.1 MG/DL (ref 8.5–10.1)
CHLORIDE SERPL-SCNC: 110 MMOL/L (ref 97–108)
CO2 SERPL-SCNC: 24 MMOL/L (ref 21–32)
COMMENT, HOLDF: NORMAL
CREAT SERPL-MCNC: 1.33 MG/DL (ref 0.7–1.3)
DIFFERENTIAL METHOD BLD: ABNORMAL
EOSINOPHIL # BLD: 0.1 K/UL (ref 0–0.4)
EOSINOPHIL NFR BLD: 1 % (ref 0–7)
ERYTHROCYTE [DISTWIDTH] IN BLOOD BY AUTOMATED COUNT: 13.3 % (ref 11.5–14.5)
GLOBULIN SER CALC-MCNC: 3.2 G/DL (ref 2–4)
GLUCOSE SERPL-MCNC: 111 MG/DL (ref 65–100)
HCT VFR BLD AUTO: 46.2 % (ref 36.6–50.3)
HGB BLD-MCNC: 15.5 G/DL (ref 12.1–17)
IMM GRANULOCYTES # BLD AUTO: 0 K/UL (ref 0–0.04)
IMM GRANULOCYTES NFR BLD AUTO: 0 % (ref 0–0.5)
LYMPHOCYTES # BLD: 0.8 K/UL (ref 0.8–3.5)
LYMPHOCYTES NFR BLD: 10 % (ref 12–49)
MCH RBC QN AUTO: 31.4 PG (ref 26–34)
MCHC RBC AUTO-ENTMCNC: 33.5 G/DL (ref 30–36.5)
MCV RBC AUTO: 93.5 FL (ref 80–99)
MONOCYTES # BLD: 0.8 K/UL (ref 0–1)
MONOCYTES NFR BLD: 10 % (ref 5–13)
NEUTS SEG # BLD: 5.8 K/UL (ref 1.8–8)
NEUTS SEG NFR BLD: 79 % (ref 32–75)
NRBC # BLD: 0 K/UL (ref 0–0.01)
NRBC BLD-RTO: 0 PER 100 WBC
PLATELET # BLD AUTO: 164 K/UL (ref 150–400)
PMV BLD AUTO: 10.6 FL (ref 8.9–12.9)
POTASSIUM SERPL-SCNC: 3.6 MMOL/L (ref 3.5–5.1)
PROT SERPL-MCNC: 7 G/DL (ref 6.4–8.2)
RBC # BLD AUTO: 4.94 M/UL (ref 4.1–5.7)
RBC MORPH BLD: ABNORMAL
SAMPLES BEING HELD,HOLD: NORMAL
SODIUM SERPL-SCNC: 140 MMOL/L (ref 136–145)
WBC # BLD AUTO: 7.5 K/UL (ref 4.1–11.1)

## 2022-05-20 PROCEDURE — 36415 COLL VENOUS BLD VENIPUNCTURE: CPT

## 2022-05-20 PROCEDURE — 76060000032 HC ANESTHESIA 0.5 TO 1 HR: Performed by: INTERNAL MEDICINE

## 2022-05-20 PROCEDURE — 77030008684 HC TU ET CUF COVD -B: Performed by: ANESTHESIOLOGY

## 2022-05-20 PROCEDURE — 77030026438 HC STYL ET INTUB CARD -A: Performed by: ANESTHESIOLOGY

## 2022-05-20 PROCEDURE — 74011250636 HC RX REV CODE- 250/636: Performed by: ANESTHESIOLOGY

## 2022-05-20 PROCEDURE — 74011000250 HC RX REV CODE- 250: Performed by: NURSE ANESTHETIST, CERTIFIED REGISTERED

## 2022-05-20 PROCEDURE — 74011250636 HC RX REV CODE- 250/636: Performed by: INTERNAL MEDICINE

## 2022-05-20 PROCEDURE — 74011250637 HC RX REV CODE- 250/637: Performed by: INTERNAL MEDICINE

## 2022-05-20 PROCEDURE — 0W3P8ZZ CONTROL BLEEDING IN GASTROINTESTINAL TRACT, VIA NATURAL OR ARTIFICIAL OPENING ENDOSCOPIC: ICD-10-PCS | Performed by: INTERNAL MEDICINE

## 2022-05-20 PROCEDURE — 77030010936 HC CLP LIG BSC -C: Performed by: INTERNAL MEDICINE

## 2022-05-20 PROCEDURE — 2709999900 HC NON-CHARGEABLE SUPPLY: Performed by: INTERNAL MEDICINE

## 2022-05-20 PROCEDURE — 74011250637 HC RX REV CODE- 250/637: Performed by: HOSPITALIST

## 2022-05-20 PROCEDURE — 76040000007: Performed by: INTERNAL MEDICINE

## 2022-05-20 PROCEDURE — 65270000046 HC RM TELEMETRY

## 2022-05-20 PROCEDURE — 85025 COMPLETE CBC W/AUTO DIFF WBC: CPT

## 2022-05-20 PROCEDURE — 80053 COMPREHEN METABOLIC PANEL: CPT

## 2022-05-20 PROCEDURE — 74011250636 HC RX REV CODE- 250/636: Performed by: NURSE ANESTHETIST, CERTIFIED REGISTERED

## 2022-05-20 PROCEDURE — 74011000250 HC RX REV CODE- 250: Performed by: ANESTHESIOLOGY

## 2022-05-20 PROCEDURE — 99284 EMERGENCY DEPT VISIT MOD MDM: CPT

## 2022-05-20 DEVICE — WORKING LENGTH 235CM, WORKING CHANNEL 2.8MM
Type: IMPLANTABLE DEVICE | Site: STOMACH | Status: FUNCTIONAL
Brand: RESOLUTION 360 CLIP

## 2022-05-20 RX ORDER — ONDANSETRON 4 MG/1
4 TABLET, ORALLY DISINTEGRATING ORAL
Status: DISCONTINUED | OUTPATIENT
Start: 2022-05-20 | End: 2022-05-22 | Stop reason: HOSPADM

## 2022-05-20 RX ORDER — POLYETHYLENE GLYCOL 3350 17 G/17G
17 POWDER, FOR SOLUTION ORAL DAILY PRN
Status: DISCONTINUED | OUTPATIENT
Start: 2022-05-20 | End: 2022-05-22 | Stop reason: HOSPADM

## 2022-05-20 RX ORDER — SODIUM CHLORIDE 0.9 % (FLUSH) 0.9 %
5-40 SYRINGE (ML) INJECTION EVERY 8 HOURS
Status: DISCONTINUED | OUTPATIENT
Start: 2022-05-20 | End: 2022-05-22 | Stop reason: HOSPADM

## 2022-05-20 RX ORDER — ATROPINE SULFATE 0.1 MG/ML
0.5 INJECTION INTRAVENOUS
Status: DISCONTINUED | OUTPATIENT
Start: 2022-05-20 | End: 2022-05-20 | Stop reason: HOSPADM

## 2022-05-20 RX ORDER — ONDANSETRON 2 MG/ML
4 INJECTION INTRAMUSCULAR; INTRAVENOUS
Status: DISCONTINUED | OUTPATIENT
Start: 2022-05-20 | End: 2022-05-22 | Stop reason: HOSPADM

## 2022-05-20 RX ORDER — SODIUM CHLORIDE 0.9 % (FLUSH) 0.9 %
5-40 SYRINGE (ML) INJECTION AS NEEDED
Status: DISCONTINUED | OUTPATIENT
Start: 2022-05-20 | End: 2022-05-22 | Stop reason: HOSPADM

## 2022-05-20 RX ORDER — TRAMADOL HYDROCHLORIDE 50 MG/1
25-50 TABLET ORAL
Status: DISCONTINUED | OUTPATIENT
Start: 2022-05-20 | End: 2022-05-22 | Stop reason: HOSPADM

## 2022-05-20 RX ORDER — SUCCINYLCHOLINE CHLORIDE 20 MG/ML
INJECTION INTRAMUSCULAR; INTRAVENOUS AS NEEDED
Status: DISCONTINUED | OUTPATIENT
Start: 2022-05-20 | End: 2022-05-20 | Stop reason: HOSPADM

## 2022-05-20 RX ORDER — ROCURONIUM BROMIDE 10 MG/ML
INJECTION, SOLUTION INTRAVENOUS AS NEEDED
Status: DISCONTINUED | OUTPATIENT
Start: 2022-05-20 | End: 2022-05-20 | Stop reason: HOSPADM

## 2022-05-20 RX ORDER — AMLODIPINE BESYLATE 5 MG/1
10 TABLET ORAL DAILY
Status: DISCONTINUED | OUTPATIENT
Start: 2022-05-21 | End: 2022-05-20

## 2022-05-20 RX ORDER — AMLODIPINE BESYLATE 5 MG/1
10 TABLET ORAL DAILY
Status: DISCONTINUED | OUTPATIENT
Start: 2022-05-20 | End: 2022-05-22 | Stop reason: HOSPADM

## 2022-05-20 RX ORDER — EPINEPHRINE 0.1 MG/ML
1 INJECTION INTRACARDIAC; INTRAVENOUS
Status: COMPLETED | OUTPATIENT
Start: 2022-05-20 | End: 2022-05-20

## 2022-05-20 RX ORDER — ATORVASTATIN CALCIUM 10 MG/1
10 TABLET, FILM COATED ORAL DAILY
Status: DISCONTINUED | OUTPATIENT
Start: 2022-05-21 | End: 2022-05-22 | Stop reason: HOSPADM

## 2022-05-20 RX ORDER — PHENYLEPHRINE HCL IN 0.9% NACL 0.4MG/10ML
SYRINGE (ML) INTRAVENOUS AS NEEDED
Status: DISCONTINUED | OUTPATIENT
Start: 2022-05-20 | End: 2022-05-20 | Stop reason: HOSPADM

## 2022-05-20 RX ORDER — GLYCOPYRROLATE 0.2 MG/ML
INJECTION INTRAMUSCULAR; INTRAVENOUS AS NEEDED
Status: DISCONTINUED | OUTPATIENT
Start: 2022-05-20 | End: 2022-05-20 | Stop reason: HOSPADM

## 2022-05-20 RX ORDER — PANTOPRAZOLE SODIUM 40 MG/1
40 TABLET, DELAYED RELEASE ORAL
Status: DISCONTINUED | OUTPATIENT
Start: 2022-05-20 | End: 2022-05-22 | Stop reason: HOSPADM

## 2022-05-20 RX ORDER — FENTANYL CITRATE 50 UG/ML
200 INJECTION, SOLUTION INTRAMUSCULAR; INTRAVENOUS
Status: DISCONTINUED | OUTPATIENT
Start: 2022-05-20 | End: 2022-05-20 | Stop reason: HOSPADM

## 2022-05-20 RX ORDER — MIDAZOLAM HYDROCHLORIDE 1 MG/ML
.25-5 INJECTION, SOLUTION INTRAMUSCULAR; INTRAVENOUS
Status: DISCONTINUED | OUTPATIENT
Start: 2022-05-20 | End: 2022-05-20 | Stop reason: HOSPADM

## 2022-05-20 RX ORDER — SODIUM CHLORIDE 9 MG/ML
150 INJECTION, SOLUTION INTRAVENOUS CONTINUOUS
Status: DISPENSED | OUTPATIENT
Start: 2022-05-20 | End: 2022-05-20

## 2022-05-20 RX ORDER — PROPOFOL 10 MG/ML
INJECTION, EMULSION INTRAVENOUS AS NEEDED
Status: DISCONTINUED | OUTPATIENT
Start: 2022-05-20 | End: 2022-05-20 | Stop reason: HOSPADM

## 2022-05-20 RX ORDER — GUAIFENESIN 100 MG/5ML
81 LIQUID (ML) ORAL DAILY
Status: DISCONTINUED | OUTPATIENT
Start: 2022-05-21 | End: 2022-05-20

## 2022-05-20 RX ORDER — ONDANSETRON 2 MG/ML
INJECTION INTRAMUSCULAR; INTRAVENOUS AS NEEDED
Status: DISCONTINUED | OUTPATIENT
Start: 2022-05-20 | End: 2022-05-20 | Stop reason: HOSPADM

## 2022-05-20 RX ORDER — DEXTROMETHORPHAN/PSEUDOEPHED 2.5-7.5/.8
1.2 DROPS ORAL
Status: DISCONTINUED | OUTPATIENT
Start: 2022-05-20 | End: 2022-05-20 | Stop reason: HOSPADM

## 2022-05-20 RX ORDER — GUAIFENESIN 100 MG/5ML
81 LIQUID (ML) ORAL DAILY
Status: DISCONTINUED | OUTPATIENT
Start: 2022-05-20 | End: 2022-05-22 | Stop reason: HOSPADM

## 2022-05-20 RX ORDER — ACETAMINOPHEN 650 MG/1
650 SUPPOSITORY RECTAL
Status: DISCONTINUED | OUTPATIENT
Start: 2022-05-20 | End: 2022-05-22 | Stop reason: HOSPADM

## 2022-05-20 RX ORDER — ACETAMINOPHEN 325 MG/1
650 TABLET ORAL
Status: DISCONTINUED | OUTPATIENT
Start: 2022-05-20 | End: 2022-05-22 | Stop reason: HOSPADM

## 2022-05-20 RX ORDER — LIDOCAINE HYDROCHLORIDE 20 MG/ML
INJECTION, SOLUTION EPIDURAL; INFILTRATION; INTRACAUDAL; PERINEURAL AS NEEDED
Status: DISCONTINUED | OUTPATIENT
Start: 2022-05-20 | End: 2022-05-20 | Stop reason: HOSPADM

## 2022-05-20 RX ADMIN — PANTOPRAZOLE SODIUM 40 MG: 40 TABLET, DELAYED RELEASE ORAL at 18:12

## 2022-05-20 RX ADMIN — PROPOFOL 50 MG: 10 INJECTION, EMULSION INTRAVENOUS at 15:32

## 2022-05-20 RX ADMIN — ONDANSETRON HYDROCHLORIDE 4 MG: 2 INJECTION, SOLUTION INTRAMUSCULAR; INTRAVENOUS at 14:25

## 2022-05-20 RX ADMIN — PROPOFOL 50 MG: 10 INJECTION, EMULSION INTRAVENOUS at 15:31

## 2022-05-20 RX ADMIN — GLYCOPYRROLATE 0.1 MG: 0.2 INJECTION, SOLUTION INTRAMUSCULAR; INTRAVENOUS at 14:25

## 2022-05-20 RX ADMIN — SODIUM CHLORIDE: 900 INJECTION, SOLUTION INTRAVENOUS at 15:20

## 2022-05-20 RX ADMIN — ROCURONIUM BROMIDE 5 MG: 10 SOLUTION INTRAVENOUS at 15:31

## 2022-05-20 RX ADMIN — LIDOCAINE HYDROCHLORIDE 40 MG: 20 INJECTION, SOLUTION EPIDURAL; INFILTRATION; INTRACAUDAL; PERINEURAL at 15:31

## 2022-05-20 RX ADMIN — Medication 80 MCG: at 15:35

## 2022-05-20 RX ADMIN — Medication 80 MCG: at 15:31

## 2022-05-20 RX ADMIN — SUCCINYLCHOLINE CHLORIDE 160 MG: 20 INJECTION, SOLUTION INTRAMUSCULAR; INTRAVENOUS at 15:32

## 2022-05-20 RX ADMIN — AMLODIPINE BESYLATE 10 MG: 5 TABLET ORAL at 18:12

## 2022-05-20 NOTE — ANESTHESIA POSTPROCEDURE EVALUATION
Post-Anesthesia Evaluation and Assessment    Patient: Troy Miller MRN: [id number]  SSN: [id number]    YOB: 1940  Age: 79 y.o. Sex: male      I have evaluated the patient and they are stable and ready for discharge from the PACU. Cardiovascular Function/Vital Signs  Visit Vitals  BP (!) 164/98   Pulse 91   Temp 37 °C (98.6 °F)   Resp 19   Ht 5' 6\" (1.676 m)   Wt 63.5 kg (140 lb)   SpO2 94%   BMI 22.60 kg/m²       Patient is status post General anesthesia for Procedure(s):  ESOPHAGOGASTRODUODENOSCOPY (EGD)  ENDOSCOPIC FOREIGN BODY REMOVAL  INJECTION  RESOLUTION CLIP. Nausea/Vomiting: None    Postoperative hydration reviewed and adequate. Pain:  Pain Scale 1: Numeric (0 - 10) (05/20/22 1625)  Pain Intensity 1: 0 (05/20/22 1625)   Managed    Neurological Status:   Neuro  Neurologic State: Alert (05/20/22 1019)  Orientation Level: Oriented X4 (05/20/22 1019)  Cognition: Follows commands (05/20/22 1019)  LUE Motor Response: Purposeful (05/20/22 1019)  LLE Motor Response: Purposeful (05/20/22 1019)  RUE Motor Response: Purposeful;Weak (baseline) (05/20/22 1019)  RLE Motor Response: Purposeful (05/20/22 1019)   At baseline    Mental Status, Level of Consciousness: Alert and  oriented to person, place, and time    Pulmonary Status:   O2 Device: None (Room air) (05/20/22 1619)   Adequate oxygenation and airway patent    Complications related to anesthesia: None    Post-anesthesia assessment completed. No concerns    Signed By: Bruce Guajardo MD     May 20, 2022              Procedure(s):  ESOPHAGOGASTRODUODENOSCOPY (EGD)  ENDOSCOPIC FOREIGN BODY REMOVAL  INJECTION  RESOLUTION CLIP. general    <BSHSIANPOST>    INITIAL Post-op Vital signs:   Vitals Value Taken Time   /98 05/20/22 1635   Temp 37 °C (98.6 °F) 05/20/22 1619   Pulse 92 05/20/22 1643   Resp 17 05/20/22 1643   SpO2 96 % 05/20/22 1643   Vitals shown include unvalidated device data.

## 2022-05-20 NOTE — ED TRIAGE NOTES
Pt arrived from home c/o nausea, and difficulty swallowing X 1 day. Pt has a Hx of dysphagia and strokes. Pt was recently admitted at [address] [place] [address]  for stroke 3 weeks ago and got discharged from [facility] rehab yesterday.

## 2022-05-20 NOTE — ED PROVIDER NOTES
Patient is a 79-year-old male with history of stroke with right-sided deficits, speech difficulty and dysphagia presenting to the ED with difficulty tolerating secretions and inability to swallow food. Last night patient ate chicken and feels it was stuck in his throat. ABCs intact. Patient is able to clear his secretions orally but cannot swallow them. The history is provided by the patient, a friend and medical records. Aphagia   This is a recurrent problem. The current episode started yesterday. The problem has not changed since onset. There has been no fever. Associated symptoms include vomiting, drooling and trouble swallowing. Pertinent negatives include no diarrhea and no shortness of breath. He has tried nothing for the symptoms. The treatment provided no relief. Past Medical History:   Diagnosis Date    Ankle fracture 3/2000    Right    Arthritis     Cancer (HCC)     SCCA    Hypercholesterolemia     Hypertension     Personal history of kidney stones 1968    Prostatitis 2001    Psychiatric disorder     CLAUSTROPHOBIA; MASK OVER FACE WOULD CAUSE ANXIETY; HAS REACTED TO TIGHT SPACE (UNDER HOUSE)    Unspecified adverse effect of anesthesia     mother difficult to arouse post anesthesia       Past Surgical History:   Procedure Laterality Date    HX COLONOSCOPY      HX HERNIA REPAIR  1187    UMBILICAL    HX ORTHOPAEDIC Right 2000    ORIF ANKLE WITH PINS    HX ORTHOPAEDIC Right 2017    FOOT SURGERY    HX ORTHOPAEDIC Left 1980s    FOOT SURGERY         Family History:   Problem Relation Age of Onset    Dementia Mother     Anesth Problems Mother         DIFFICULT TO AROUSE; DID NOT INVOLVE LENGTHY VENTILATOR USE.     Emphysema Father        Social History     Socioeconomic History    Marital status:      Spouse name: Not on file    Number of children: Not on file    Years of education: Not on file    Highest education level: Not on file   Occupational History    Not on file Tobacco Use    Smoking status: Never Smoker    Smokeless tobacco: Never Used   Substance and Sexual Activity    Alcohol use: Yes     Alcohol/week: 3.3 standard drinks     Types: 4 Glasses of wine per week    Drug use: No    Sexual activity: Not on file   Other Topics Concern    Not on file   Social History Narrative    Not on file     Social Determinants of Health     Financial Resource Strain:     Difficulty of Paying Living Expenses: Not on file   Food Insecurity:     Worried About Running Out of Food in the Last Year: Not on file    Yair of Food in the Last Year: Not on file   Transportation Needs:     Lack of Transportation (Medical): Not on file    Lack of Transportation (Non-Medical): Not on file   Physical Activity:     Days of Exercise per Week: Not on file    Minutes of Exercise per Session: Not on file   Stress:     Feeling of Stress : Not on file   Social Connections:     Frequency of Communication with Friends and Family: Not on file    Frequency of Social Gatherings with Friends and Family: Not on file    Attends [employer] Services: Not on file    Active Member of [address] [place] [address] or Organizations: Not on file    Attends Club or Organization Meetings: Not on file    Marital Status: Not on file   Intimate Partner Violence:     Fear of Current or Ex-Partner: Not on file    Emotionally Abused: Not on file    Physically Abused: Not on file    Sexually Abused: Not on file   Housing Stability:     Unable to Pay for Housing in the Last Year: Not on file    Number of [place] in the Last Year: Not on file    Unstable Housing in the Last Year: Not on file         ALLERGIES: Ace inhibitors and Percocet [oxycodone-acetaminophen]    Review of Systems   Constitutional: Negative. HENT: Positive for drooling and trouble swallowing. Eyes: Negative. Respiratory: Negative. Negative for shortness of breath. Cardiovascular: Negative. Gastrointestinal: Positive for vomiting. Negative for diarrhea. Endocrine: Negative. Genitourinary: Negative. Musculoskeletal: Negative. Skin: Negative. Allergic/Immunologic: Negative. Neurological: Negative. Hematological: Negative. Psychiatric/Behavioral: Negative. Vitals:    05/20/22 0842 05/20/22 1015   BP: (!) 159/105 (!) 164/81   Pulse:  76   Resp: 18    Temp: 98.9 °F (37.2 °C)    SpO2: 97% 95%   Weight: 63.5 kg (140 lb)    Height: 5' 6\" (1.676 m)             Physical Exam  Vitals and nursing note reviewed. Constitutional:       General: He is not in acute distress. Appearance: Normal appearance. HENT:      Head: Normocephalic and atraumatic. Right Ear: External ear normal.      Left Ear: External ear normal.      Nose: Nose normal.   Eyes:      Extraocular Movements: Extraocular movements intact. Conjunctiva/sclera: Conjunctivae normal.   Cardiovascular:      Rate and Rhythm: Normal rate. Pulses: Normal pulses. Radial pulses are 2+ on the right side and 2+ on the left side. Heart sounds: Normal heart sounds. Pulmonary:      Effort: Pulmonary effort is normal.      Breath sounds: Normal breath sounds. Chest:      Chest wall: No deformity or tenderness. Abdominal:      General: Abdomen is flat. There is no distension. Tenderness: There is no abdominal tenderness. Musculoskeletal:         General: No deformity or signs of injury. Normal range of motion. Cervical back: Normal range of motion and neck supple. No tenderness. Skin:     General: Skin is warm and dry. Capillary Refill: Capillary refill takes less than 2 seconds. Neurological:      General: No focal deficit present. Mental Status: He is alert and oriented to person, place, and time.    Psychiatric:         Attention and Perception: Attention normal.         Mood and Affect: Mood normal.         Behavior: Behavior normal.          MDM       LABORATORY RESULTS:  Labs Reviewed   CBC WITH AUTOMATED DIFF - Abnormal; Notable for the following components:       Result Value    NEUTROPHILS 79 (*)     LYMPHOCYTES 10 (*)     All other components within normal limits   METABOLIC PANEL, COMPREHENSIVE - Abnormal; Notable for the following components:    Chloride 110 (*)     Glucose 111 (*)     Creatinine 1.33 (*)     BUN/Creatinine ratio 11 (*)     GFR est non-AA 51 (*)     Calcium 11.1 (*)     Bilirubin, total 1.1 (*)     All other components within normal limits   SAMPLES BEING HELD       IMAGING RESULTS:  No orders to display       MEDICATIONS GIVEN:  Medications - No data to display    Differential diagnosis: Dysphagia, food bolus impaction, hiatal hernia    ED physician interpretation of laboratory results: Mild elevation in serum creatinine, near patient's baseline. Not consistent with TORIBIO. MDM: Patient is a 11-year-old male presented ED with difficulty swallowing, having to spit up his saliva with concern for food bolus impaction. GI consulted, will evaluate the patient in the ED. Patient blood work unremarkable. ABCs intact and patient stable. 12:04 PM  Change of shift. Care of patient signed over to Dr. Josefina Perry. Handoff complete. IMPRESSION:  1. Dysphagia, unspecified type        DISPOSITION:      Adam D. Rolland Gosselin, MD      Procedures

## 2022-05-20 NOTE — H&P
[address], [address] [place] [address]          Pre-procedure History and Physical       NAME:  Luther Bo   :   1940   MRN:   [id number]     CHIEF COMPLAINT/HPI: food impaction    PMH:  Past Medical History:   Diagnosis Date    Ankle fracture 3/2000    Right    Arthritis     Cancer ([address]     SCCA    Hypercholesterolemia     Hypertension     Personal history of kidney stones 1968    Prostatitis     Psychiatric disorder     CLAUSTROPHOBIA; MASK OVER FACE WOULD CAUSE ANXIETY; HAS REACTED TO TIGHT SPACE (UNDER HOUSE)    Unspecified adverse effect of anesthesia     mother difficult to arouse post anesthesia       PSH:  Past Surgical History:   Procedure Laterality Date    HX COLONOSCOPY      HX HERNIA REPAIR  4310    UMBILICAL    HX ORTHOPAEDIC Right     ORIF ANKLE WITH PINS    HX ORTHOPAEDIC Right     FOOT SURGERY    HX ORTHOPAEDIC Left     FOOT SURGERY       Allergies: Allergies   Allergen Reactions    Ace Inhibitors Cough    Percocet [Oxycodone-Acetaminophen] Nausea and Vomiting     \"I DON'T TOLERATE THE STRONG OPIODS\"       Home Medications:  Prior to Admission Medications   Prescriptions Last Dose Informant Patient Reported? Taking?   acetaminophen (TYLENOL) 500 mg tablet   No No   Sig: Take 1 Tab by mouth every four (4) hours (while awake). Schedule for pain control over the next 7-14 days. Do not exceed 3000 mg in 24 hours. Obtain over-the-counter. Indications: Postoperative Incisional Knee Pain   amlodipine (NORVASC) 10 mg tablet   Yes No   Sig: Take 10 mg by mouth daily. aspirin 81 mg chewable tablet   No No   Sig: Take 1 Tablet by mouth daily for 30 days. atorvastatin (LIPITOR) 10 mg tablet   Yes No   Sig: Take 10 mg by mouth daily. clopidogreL (PLAVIX) 75 mg tab 2022 at Unknown time  No Yes   Sig: Take 1 Tablet by mouth daily for 30 days.    hydrochlorothiazide (HYDRODIURIL) 25 mg tablet   Yes No   Sig: Take 25 mg by mouth daily. oxyCODONE IR (ROXICODONE) 5 mg immediate release tablet   No No   Sig: Take 0.5-1 Tabs by mouth every four (4) hours as needed (Severe pain not relieved by tramadol (Ultram). Take with food & fluids. ). Max Daily Amount: 30 mg. Indications: Severe Incisional Knee Pain   potassium chloride (K-DUR, KLOR-CON) 20 mEq tablet   Yes No   Sig: Take 40 mEq by mouth daily. traMADol (ULTRAM) 50 mg tablet   No No   Sig: Take 0.5-1 Tabs by mouth every six (6) hours as needed. Max Daily Amount: 200 mg. Indications: Postoperative Incisional Knee Pain      Facility-Administered Medications: None       Hospital Medications:  Current Facility-Administered Medications   Medication Dose Route Frequency    0.9% sodium chloride infusion  150 mL/hr IntraVENous CONTINUOUS    sodium chloride (NS) flush 5-40 mL  5-40 mL IntraVENous Q8H    sodium chloride (NS) flush 5-40 mL  5-40 mL IntraVENous PRN    midazolam (VERSED) injection 0.25-5 mg  0.25-5 mg IntraVENous Multiple    fentaNYL citrate (PF) injection 200 mcg  200 mcg IntraVENous Multiple    simethicone (MYLICON) 91QE/5.5HN oral drops 80 mg  1.2 mL Oral Multiple    atropine injection 0.5 mg  0.5 mg IntraVENous ONCE PRN    EPINEPHrine (ADRENALIN) 0.1 mg/mL syringe 1 mg  1 mg Endoscopically ONCE PRN     Facility-Administered Medications Ordered in Other Encounters   Medication Dose Route Frequency    ondansetron (ZOFRAN) injection   IntraVENous PRN    glycopyrrolate (ROBINUL) injection   IntraVENous PRN       Family History:  Family History   Problem Relation Age of Onset    Dementia Mother     Anesth Problems Mother         DIFFICULT TO AROUSE; DID NOT INVOLVE LENGTHY VENTILATOR USE.  Emphysema Father        Social History:  Social History     Tobacco Use    Smoking status: Never Smoker    Smokeless tobacco: Never Used   Substance Use Topics    Alcohol use:  Yes     Alcohol/week: 3.3 standard drinks     Types: 4 Glasses of wine per week       The patient was counseled at length about the risks of rui Covid-19 in the vale-operative and post-operative states including the recovery window of their procedure. The patient was made aware that rui Covid-19 after a surgical procedure may worsen their prognosis for recovering from the virus and lend to a higher morbidity and or mortality risk. The patient was given the options of postponing their procedure. All of the risks, benefits, and alternatives were discussed. The patient does  wish to proceed with the procedure. PHYSICAL EXAM PRIOR TO SEDATION:  General: Alert, in no acute distress    Lungs:            CTA bilaterally  Heart:  Normal S1, S2    Abdomen: Soft, Non distended, Non tender. Normoactive bowel sounds. Assessment:   Stable for sedation administration.     Plan:     · Endoscopic procedure with sedation     Signed By: Damion Oropeza MD     5/20/2022  3:28 PM

## 2022-05-20 NOTE — PERIOP NOTES
TRANSFER - IN REPORT:    Verbal report received from [facility] on Kam Arce  being received from ED 14 for ordered procedure      Report consisted of patients Situation, Background, Assessment and   Recommendations(SBAR). Information from the following report(s) SBAR, Cardiac Rhythm SR, Pre Procedure Checklist and Procedure Verification was reviewed with the receiving nurse. Opportunity for questions and clarification was provided. Assessment completed upon patients arrival to unit and care assumed.

## 2022-05-20 NOTE — H&P
[facility] [facility] Adult  Hospitalist Group  History and Physical    Date of Service:  5/20/2022  Primary Care Provider: Melina Melendrez MD  Source of information: The patient    Chief Complaint: Aphagia      History of Presenting Illness: Gray Bloch is a 79 y.o. male who presents with food impaction     Patient is a 58-year-old male with history of stroke with right-sided deficits, speech difficulty and dysphagia presenting to the ED with difficulty tolerating secretions and inability to swallow food. Last night patient ate chicken and feels it was stuck in his throat. ABCs intact. Patient is able to clear his secretions orally but cannot swallow them. Per chart he was admitted 4/26-5/7 for left MCA stroke, s/p TPA, then has Left CEA 5/6 by vascular surgeon. He is discharged with po asa and plavix. Diet was advanced to regular. He completed his acute rehab in [facility] and was just discharged home yesterday. He was taken to Endo urgent for endoscopy for food impaction: cleared food impaction, also noticed visible vessel without underlying ulcer in stomach, s/p epi injection and hemoclip. GI recommended to hold plavix for 2 days and monitor for bleeding. REVIEW OF SYSTEMS:  General: HPI, no changes of weight  HEENT: no headache, no vision changes, no nose discharge, no hearing changes   RES: no wheezing, no cough, no sob  CVS: no cp, no palpitation. Muscular: no joint swelling, no muscle pain, no leg swelling  Skin: no rash, no itching   GI: no vomiting, no diarrhea, HPI.    : no dysuria, no hematuria  Hemo: no gum bleeding, no petechial   Neuro: HPI, able to ambulating with a walker now   Endo: no polydipsia   Psych: denied depression     Past Medical History:   Diagnosis Date    Ankle fracture 3/2000    Right    Arthritis     Cancer ([address]     SCCA    Hypercholesterolemia     Hypertension     Personal history of kidney stones 1968    Prostatitis 2001    Psychiatric disorder CLAUSTROPHOBIA; MASK OVER FACE WOULD CAUSE ANXIETY; HAS REACTED TO TIGHT SPACE (UNDER HOUSE)    Unspecified adverse effect of anesthesia     mother difficult to arouse post anesthesia      Past Surgical History:   Procedure Laterality Date    HX COLONOSCOPY      HX HERNIA REPAIR  5343    UMBILICAL    HX ORTHOPAEDIC Right 2000    ORIF ANKLE WITH PINS    HX ORTHOPAEDIC Right 2017    FOOT SURGERY    HX ORTHOPAEDIC Left 1980s    FOOT SURGERY     Prior to Admission medications    Medication Sig Start Date End Date Taking? Authorizing Provider   clopidogreL (PLAVIX) 75 mg tab Take 1 Tablet by mouth daily for 30 days. 5/7/22 6/6/22 Yes Dot Chavez MD   aspirin 81 mg chewable tablet Take 1 Tablet by mouth daily for 30 days. 5/7/22 6/6/22  Dot Chavez MD   acetaminophen (TYLENOL) 500 mg tablet Take 1 Tab by mouth every four (4) hours (while awake). Schedule for pain control over the next 7-14 days. Do not exceed 3000 mg in 24 hours. Obtain over-the-counter. Indications: Postoperative Incisional Knee Pain 2/15/18   Mak Stain, NP   oxyCODONE IR (ROXICODONE) 5 mg immediate release tablet Take 0.5-1 Tabs by mouth every four (4) hours as needed (Severe pain not relieved by tramadol (Ultram). Take with food & fluids. ). Max Daily Amount: 30 mg. Indications: Severe Incisional Knee Pain 2/15/18   Mak Stain, NP   traMADol [place]) 50 mg tablet Take 0.5-1 Tabs by mouth every six (6) hours as needed. Max Daily Amount: 200 mg. Indications: Postoperative Incisional Knee Pain 2/15/18   Mak Stain, NP   potassium chloride (K-DUR, KLOR-CON) 20 mEq tablet Take 40 mEq by mouth daily. Provider, Historical   atorvastatin (LIPITOR) 10 mg tablet Take 10 mg by mouth daily. Provider, Historical   amlodipine (NORVASC) 10 mg tablet Take 10 mg by mouth daily. Provider, Historical   hydrochlorothiazide (HYDRODIURIL) 25 mg tablet Take 25 mg by mouth daily.     Provider, Historical     Allergies   Allergen Reactions  Ace Inhibitors Cough    Percocet [Oxycodone-Acetaminophen] Nausea and Vomiting     \"I DON'T TOLERATE THE STRONG OPIODS\"      Family History   Problem Relation Age of Onset    Dementia Mother     Anesth Problems Mother         DIFFICULT TO AROUSE; DID NOT INVOLVE LENGTHY VENTILATOR USE.  Emphysema Father       Social History:  reports that he has never smoked. He has never used smokeless tobacco. He reports current alcohol use of about 3.3 standard drinks of alcohol per week. He reports that he does not use drugs. Family and social history were personally reviewed, all pertinent and relevant details are outlined as above. Objective:     Visit Vitals  BP (!) 157/90   Pulse 91   Temp 98.6 °F (37 °C)   Resp 21   Ht 5' 6\" (1.676 m)   Wt 63.5 kg (140 lb)   SpO2 94%   BMI 22.60 kg/m²      O2 Device: None (Room air)    PHYSICAL EXAM:   General: Alert x oriented x 3, awake, no acute distress, resting in bed  HEENT: PEERL, EOMI, moist mucus membranes  Neck: Supple, no JVD, no meningeal signs  Chest: Clear to auscultation bilaterally   CVS: RRR, S1 S2 heard, no murmurs/rubs/gallops  Abd: Soft, non-tender, non-distended, +bowel sounds   Ext: No clubbing, no cyanosis, no edema  Neuro/Psych: Pleasant mood and affect, CN 2-12 grossly intact, no focal weakness   Cap refill: Brisk, less than 3 seconds  Pulses: 2+, symmetric in all extremities  Skin: Warm, dry, without rashes or lesions    Data Review: All diagnostic labs and studies have been reviewed.     Abnormal Labs Reviewed   CBC WITH AUTOMATED DIFF - Abnormal; Notable for the following components:       Result Value    NEUTROPHILS 79 (*)     LYMPHOCYTES 10 (*)     All other components within normal limits   METABOLIC PANEL, COMPREHENSIVE - Abnormal; Notable for the following components:    Chloride 110 (*)     Glucose 111 (*)     Creatinine 1.33 (*)     BUN/Creatinine ratio 11 (*)     GFR est non-AA 51 (*)     Calcium 11.1 (*)     Bilirubin, total 1.1 (*) All other components within normal limits       All Micro Results     None          IMAGING:   No orders to display        ECG/ECHO:    Results for orders placed or performed during the hospital encounter of 04/26/22   EKG, 12 LEAD, INITIAL   Result Value Ref Range    Ventricular Rate 78 BPM    Atrial Rate 78 BPM    P-R Interval 164 ms    QRS Duration 72 ms    Q-T Interval 388 ms    QTC Calculation (Bezet) 442 ms    Calculated P Axis 15 degrees    Calculated R Axis 27 degrees    Calculated T Axis 12 degrees    Diagnosis       Sinus rhythm with premature atrial complexes with aberrant conduction  Possible Inferior infarct , age undetermined  Abnormal ECG  When compared with ECG of 29-JAN-2018 09:19,  aberrant conduction is now present  ST now depressed in Anterior leads  T wave inversion now evident in Inferior leads  Confirmed by Raf Collins ([id number]) on 4/27/2022 9:45:28 PM          Assessment:   Given the patient's current clinical presentation, there is a high level of concern for decompensation if discharged from the emergency department. Complex decision making was performed, which includes reviewing the patient's available past medical records, laboratory results, and imaging studies. Active Problems:    Food impaction of esophagus (5/20/2022)      Plan:     1. Dysphagia with food impaction: s/p endoscopy, clear liquid diet tonight, may advance full liquid diet tomorrow per GI. If tolerate, will advance to mechanical soft on Sunday. 2. Visible gastric vessel: s/p epi injection and hemoclip. Monitor CBC, GI recommended to hold plavix for 2 days, may resume on Sunday if no bleeding. I discussed this with vascular surgeon Dr. Vladimir Ray call due to his recent Left CEA, Vascular surgeon in ok to hold plavix for 2 days, but would continue asa. PPI bid . Addendum: D/w Dr. Gerardo Tinsley regarding the plavix, if pt is doing well overnight, no signs of bleeding, may even consider restart plavix tomorrow.    3. Duodenum: multiple clean based erosions : PPI bid per GI. 4. Recent CVA s/p Left CEA(5/6): continue asa, statin, may resume plavix on Sunday if no bleeding. DIET: ADULT DIET Clear Liquid   ISOLATION PRECAUTIONS: There are currently no Active Isolations  CODE STATUS: DNR   DVT PROPHYLAXIS: SCDs  FUNCTIONAL STATUS PRIOR TO HOSPITALIZATION: Fully active and ambulatory; able to carry on all self-care without restriction. EARLY MOBILITY ASSESSMENT: Recommend routine ambulation while hospitalized with the assistance of nursing staff  ANTICIPATED DISCHARGE: Greater than 48 hours. EMERGENCY CONTACT/SURROGATE DECISION MAKER: pt makes his own decision ,daughter Jeramy Silva called and updated. Tania Silva would like be called daily for update     CRITICAL CARE WAS PERFORMED FOR THIS ENCOUNTER: NO.      Signed By: Radha Stratton MD     May 20, 2022         Please note that this dictation may have been completed with Dragon, the computer voice recognition software. Quite often unanticipated grammatical, syntax, homophones, and other interpretive errors are inadvertently transcribed by the computer software. Please disregard these errors. Please excuse any errors that have escaped final proofreading.

## 2022-05-20 NOTE — ANESTHESIA PREPROCEDURE EVALUATION
Relevant Problems   NEUROLOGY   (+) CVA (cerebral vascular accident) [facility])       Anesthetic History   No history of anesthetic complications            Review of Systems / Medical History  Patient summary reviewed, nursing notes reviewed and pertinent labs reviewed    Pulmonary  Within defined limits                 Neuro/Psych       CVA  Psychiatric history     Cardiovascular  Within defined limits  Hypertension              Exercise tolerance: <4 METS     GI/Hepatic/Renal         Renal disease: CRI      Comments: Food bolus  Dysphagia  Endo/Other  Within defined limits      Arthritis     Other Findings   Comments: CEA 5/2022 for L ICA stenosis           Physical Exam    Airway  Mallampati: II  TM Distance: > 6 cm  Neck ROM: normal range of motion   Mouth opening: Normal     Cardiovascular          Murmur: Grade 2, Mitral area     Dental  No notable dental hx       Pulmonary  Breath sounds clear to auscultation               Abdominal  GI exam deferred       Other Findings            Anesthetic Plan    ASA: 3  Anesthesia type: general          Induction: Intravenous  Anesthetic plan and risks discussed with: Patient

## 2022-05-20 NOTE — PERIOP NOTES
1620- Received report from Maria Luisa Recio. Pt currently in recovery. [id number]- Patient is in recovery at this time. Oumar(Caregiver) at bedside with patient. Sabrina sent home with family/friends. Dr. Selina Rubinstein is at bedside assessing patient for admission.    0868- report called to [facility], [address]

## 2022-05-20 NOTE — PERIOP NOTES

## 2022-05-20 NOTE — PROCEDURES
Susanne [place] [address] Lazarus Plan, M.D.  174 [facility], [address] [place] [address]  [phone]               Esophagogastroduodenoscopy (EGD) Procedure Note    NAME: Colton Johnson  :  1940  MRN:  [id number]    Indications:  Food impaction     : Maryan Tony MD    Referring Provider:  Guille Rodriguez MD    Staff: Endoscopy Nadine Luu: Skip Payton  Endoscopy RN-1: Rajni Busch RN    Prosthetic devices, grafts, tissues, transplant, or devices implanted: none    Medicine:  General anesthesia      Procedure Details:  After informed consent was obtained with all risks and benefits of the procedure explained and preprocedure exam completed, the patient was placed in the left lateral decubitus position. Universal protocol for patient identification was performed and documented in the nursing notes. Throughout the procedure, the patient's blood pressure was monitored at least every five minutes; pulse, and oxygen saturations were monitored continuously. All vital signs were documented in the nursing notes. The endoscope was inserted into the mouth and advanced under direct vision to second portion of the duodenum. A careful inspection was made as the gastroscope was withdrawn, including a retroflexed view of the proximal stomach; findings and interventions are described below. Findings:   Esophagus: vegetable impaction mainly of broccoli in the distal esophagus-able to gently pass into the stomach with advancement of the scope, moderate benign appearing ring in the distal esophagus  Stomach: visible vessel without underlying ulcer seen in the cardia s/p 1 cc epinephrine injection at 4 quadrants around the vessel and placement of 1 hemoclip, mild diffuse erythema in the rest of the stomach  Duodenum: multiple clean based erosions with greatest diameter of 5 mm in the bulb and 2nd portion of the duodenum    Interventions:    No biopsies due to recent Plavix.  Epinephrine and clip placement in the stomach    Specimens:   * No specimens in log *     EBL: None          Complications:     No immediate complications        Impression:  As above. Recommendations:  Medicine observation due to visible vessel in the stomach  -CLD today, FLD tomorrow, soft mechanical diet on Sunday if he does well  -Check H. Pylori Ab, treat if positive  -Avoid NSAIDs  -PPI 40 mg BID  -Resume Plavix on Saturday if no signs of bleeding. Continue ASA. -Weekend coverage to follow. Discussed case with Dr. Darolyn Merlin. Signed by:  Levorn Boxer, MD         5/20/2022 4:27 PM

## 2022-05-20 NOTE — CONSULTS
[address] [facility] NOTE  Jonna Louis, [facility] office  [phone] NP in-hospital cell phone M-F until 4:30  After 5pm or on weekends, please call  for physician on call        NAME:  Anisha Segundo   :   1940   MRN:   [id number]       Referring Physician: Rita Cloud Date: 2022 12:02 PM    Chief Complaint: dysphagia, food bolus     History of Present Illness:  Patient is a 79 y.o. who is seen in consultation at the request of Dr. Dennis Allen for dysphagia, food bolus. Medical history as listed below includes recent stroke no Plavix. He was eating chicken for dinner last night and since that time has chest pain and dysphagia, unable to swallow secretions. He reports episodes like this 5-6 times in the past (before his stroke) which resolved spontaneously. No NSAID's. No history of EGD. I have reviewed the emergency room note, hospital admission note, notes by all other clinicians who have seen the patient during this hospitalization to date. I have reviewed the problem list and the reason for this hospitalization. I have reviewed the allergies and the medications the patient was taking at home prior to this hospitalization.     PMH:  Past Medical History:   Diagnosis Date    Ankle fracture 3/2000    Right    Arthritis     Cancer ([address]     SCCA    Hypercholesterolemia     Hypertension     Personal history of kidney stones 1968    Prostatitis     Psychiatric disorder     CLAUSTROPHOBIA; MASK OVER FACE WOULD CAUSE ANXIETY; HAS REACTED TO TIGHT SPACE (UNDER HOUSE)    Unspecified adverse effect of anesthesia     mother difficult to arouse post anesthesia       PSH:  Past Surgical History:   Procedure Laterality Date    HX COLONOSCOPY      HX HERNIA REPAIR  5703    UMBILICAL    HX ORTHOPAEDIC Right     ORIF ANKLE WITH PINS    HX ORTHOPAEDIC Right 2017    FOOT SURGERY  HX ORTHOPAEDIC Left 1980s    FOOT SURGERY       Allergies: Allergies   Allergen Reactions    Ace Inhibitors Cough    Percocet [Oxycodone-Acetaminophen] Nausea and Vomiting     \"I DON'T TOLERATE THE STRONG OPIODS\"       Home Medications:  Prior to Admission Medications   Prescriptions Last Dose Informant Patient Reported? Taking?   acetaminophen (TYLENOL) 500 mg tablet   No No   Sig: Take 1 Tab by mouth every four (4) hours (while awake). Schedule for pain control over the next 7-14 days. Do not exceed 3000 mg in 24 hours. Obtain over-the-counter. Indications: Postoperative Incisional Knee Pain   amlodipine (NORVASC) 10 mg tablet   Yes No   Sig: Take 10 mg by mouth daily. aspirin 81 mg chewable tablet   No No   Sig: Take 1 Tablet by mouth daily for 30 days. atorvastatin (LIPITOR) 10 mg tablet   Yes No   Sig: Take 10 mg by mouth daily. clopidogreL (PLAVIX) 75 mg tab   No No   Sig: Take 1 Tablet by mouth daily for 30 days. hydrochlorothiazide (HYDRODIURIL) 25 mg tablet   Yes No   Sig: Take 25 mg by mouth daily. oxyCODONE IR (ROXICODONE) 5 mg immediate release tablet   No No   Sig: Take 0.5-1 Tabs by mouth every four (4) hours as needed (Severe pain not relieved by tramadol (Ultram). Take with food & fluids. ). Max Daily Amount: 30 mg. Indications: Severe Incisional Knee Pain   potassium chloride (K-DUR, KLOR-CON) 20 mEq tablet   Yes No   Sig: Take 40 mEq by mouth daily. traMADol (ULTRAM) 50 mg tablet   No No   Sig: Take 0.5-1 Tabs by mouth every six (6) hours as needed. Max Daily Amount: 200 mg. Indications: Postoperative Incisional Knee Pain      Facility-Administered Medications: None       Hospital Medications:  No current facility-administered medications for this encounter. Current Outpatient Medications   Medication Sig    clopidogreL (PLAVIX) 75 mg tab Take 1 Tablet by mouth daily for 30 days.  aspirin 81 mg chewable tablet Take 1 Tablet by mouth daily for 30 days.     acetaminophen (TYLENOL) 500 mg tablet Take 1 Tab by mouth every four (4) hours (while awake). Schedule for pain control over the next 7-14 days. Do not exceed 3000 mg in 24 hours. Obtain over-the-counter. Indications: Postoperative Incisional Knee Pain    oxyCODONE IR (ROXICODONE) 5 mg immediate release tablet Take 0.5-1 Tabs by mouth every four (4) hours as needed (Severe pain not relieved by tramadol (Ultram). Take with food & fluids. ). Max Daily Amount: 30 mg. Indications: Severe Incisional Knee Pain    traMADol (ULTRAM) 50 mg tablet Take 0.5-1 Tabs by mouth every six (6) hours as needed. Max Daily Amount: 200 mg. Indications: Postoperative Incisional Knee Pain    potassium chloride (K-DUR, KLOR-CON) 20 mEq tablet Take 40 mEq by mouth daily.  atorvastatin (LIPITOR) 10 mg tablet Take 10 mg by mouth daily.  amlodipine (NORVASC) 10 mg tablet Take 10 mg by mouth daily.  hydrochlorothiazide (HYDRODIURIL) 25 mg tablet Take 25 mg by mouth daily. Social History:  Social History     Tobacco Use    Smoking status: Never Smoker    Smokeless tobacco: Never Used   Substance Use Topics    Alcohol use: Yes     Alcohol/week: 3.3 standard drinks     Types: 4 Glasses of wine per week       Family History:  Family History   Problem Relation Age of Onset    Dementia Mother     Anesth Problems Mother         DIFFICULT TO AROUSE; DID NOT INVOLVE LENGTHY VENTILATOR USE.     Emphysema Father        Review of Systems:  Constitutional: negative fever, negative chills, negative weight loss  Eyes:   negative visual changes  ENT:   negative sore throat, tongue or lip swelling  Respiratory:  negative cough, negative dyspnea  Cards:  negative for chest pain, palpitations, lower extremity edema  GI:   See HPI  :  negative for frequency, dysuria  Integument:  negative for rash and pruritus  Heme:  negative for easy bruising and gum/nose bleeding  Musculoskeletal:negative for myalgias, back pain and muscle weakness  Neuro: negative for headaches, dizziness, vertigo  Psych: negative for feelings of anxiety, depression     Objective:     Patient Vitals for the past 8 hrs:   BP Temp Pulse Resp SpO2 Height Weight   05/20/22 1015 (!) 164/81 -- 76 -- 95 % -- --   05/20/22 0842 (!) 159/105 98.9 °F (37.2 °C) -- 18 97 % 5' 6\" (1.676 m) 63.5 kg (140 lb)     No intake/output data recorded. No intake/output data recorded. EXAM:     CONST:  Pleasant male lying in bed, no acute distress, spitting out secretions    NEURO:  alert and oriented x 3   HEENT: EOMI, no scleral icterus   LUNGS: No increased WOB   CARD:   Regular rate   ABD:  soft, no tenderness, no rebound, no masses, non distended   EXT:  no edema, warm   PSYCH: full, not anxious     Data Review     Recent Labs     05/20/22  1014   WBC 7.5   HGB 15.5   HCT 46.2        Recent Labs     05/20/22  1014      K 3.6   *   CO2 24   BUN 15   CREA 1.33*   *   CA 11.1*     Recent Labs     05/20/22  1014   AP 96   TP 7.0   ALB 3.8   GLOB 3.2     No results for input(s): INR, PTP, APTT, INREXT in the last 72 hours. Assessment:     · Food bolus  · Recent Stroke treated with TPA on Plavix      Patient Active Problem List   Diagnosis Code    Primary localized osteoarthritis of right knee M17.11    CVA (cerebral vascular accident) ([facility] [address] I63.9     Plan:       · NPO  · Plan for EGD, discussed risk and he is agreeable   · Patient discussed with Dr. Ewa Forrest  · Thank you for allowing me to participate in care of Pricila Rossi By: Darlis Schirmer, NP     5/20/2022  12:02 PM       Gastroenterology Attending Physician attestation statement and comments. This patient was seen and examined by me in a face-to-face visit today. I reviewed the medical record including lab work, imaging and other provider notes. I confirmed the history as described above. I spoke to the patient, reviewed the medical record including lab work, imaging and other provider notes.  I discussed this case in detail with Gopi QUACH. I formulated an  assessment of this patient and developed a treatment plan. I agree with the above consultation note. I agree with the history, exam and assessment and plan as outlined in the note. I would like to add the following:    Abd: normoactive BS, nt, nd, no rebound/guarding. Food impaction. EGD today.     Dr. Rohit Lemus

## 2022-05-20 NOTE — ED NOTES
12:04 PM  Change of shift. Care of patient taken over from Dr. Avelino Abraham; H&P reviewed, bedside handoff complete. Awaiting GI evaluation, anticipate endoscopy for food bolus.

## 2022-05-21 LAB
ANION GAP SERPL CALC-SCNC: 6 MMOL/L (ref 5–15)
BUN SERPL-MCNC: 14 MG/DL (ref 6–20)
BUN/CREAT SERPL: 12 (ref 12–20)
CALCIUM SERPL-MCNC: 9.5 MG/DL (ref 8.5–10.1)
CHLORIDE SERPL-SCNC: 112 MMOL/L (ref 97–108)
CO2 SERPL-SCNC: 24 MMOL/L (ref 21–32)
CREAT SERPL-MCNC: 1.18 MG/DL (ref 0.7–1.3)
ERYTHROCYTE [DISTWIDTH] IN BLOOD BY AUTOMATED COUNT: 13.5 % (ref 11.5–14.5)
GLUCOSE SERPL-MCNC: 96 MG/DL (ref 65–100)
HCT VFR BLD AUTO: 41.7 % (ref 36.6–50.3)
HGB BLD-MCNC: 13.9 G/DL (ref 12.1–17)
MCH RBC QN AUTO: 31.5 PG (ref 26–34)
MCHC RBC AUTO-ENTMCNC: 33.3 G/DL (ref 30–36.5)
MCV RBC AUTO: 94.6 FL (ref 80–99)
NRBC # BLD: 0 K/UL (ref 0–0.01)
NRBC BLD-RTO: 0 PER 100 WBC
PLATELET # BLD AUTO: 155 K/UL (ref 150–400)
PMV BLD AUTO: 10.9 FL (ref 8.9–12.9)
POTASSIUM SERPL-SCNC: 3.4 MMOL/L (ref 3.5–5.1)
RBC # BLD AUTO: 4.41 M/UL (ref 4.1–5.7)
SODIUM SERPL-SCNC: 142 MMOL/L (ref 136–145)
WBC # BLD AUTO: 7.3 K/UL (ref 4.1–11.1)

## 2022-05-21 PROCEDURE — 85027 COMPLETE CBC AUTOMATED: CPT

## 2022-05-21 PROCEDURE — 80048 BASIC METABOLIC PNL TOTAL CA: CPT

## 2022-05-21 PROCEDURE — 86677 HELICOBACTER PYLORI ANTIBODY: CPT

## 2022-05-21 PROCEDURE — 74011250637 HC RX REV CODE- 250/637: Performed by: INTERNAL MEDICINE

## 2022-05-21 PROCEDURE — 36415 COLL VENOUS BLD VENIPUNCTURE: CPT

## 2022-05-21 PROCEDURE — 74011250637 HC RX REV CODE- 250/637: Performed by: HOSPITALIST

## 2022-05-21 PROCEDURE — 74011000250 HC RX REV CODE- 250: Performed by: HOSPITALIST

## 2022-05-21 PROCEDURE — 65270000046 HC RM TELEMETRY

## 2022-05-21 RX ORDER — POTASSIUM CHLORIDE 750 MG/1
40 TABLET, FILM COATED, EXTENDED RELEASE ORAL
Status: COMPLETED | OUTPATIENT
Start: 2022-05-21 | End: 2022-05-21

## 2022-05-21 RX ORDER — CLOPIDOGREL BISULFATE 75 MG/1
75 TABLET ORAL DAILY
Status: DISCONTINUED | OUTPATIENT
Start: 2022-05-21 | End: 2022-05-22 | Stop reason: HOSPADM

## 2022-05-21 RX ADMIN — CLOPIDOGREL BISULFATE 75 MG: 75 TABLET ORAL at 15:56

## 2022-05-21 RX ADMIN — ASPIRIN 81 MG 81 MG: 81 TABLET ORAL at 11:10

## 2022-05-21 RX ADMIN — POTASSIUM CHLORIDE 40 MEQ: 750 TABLET, FILM COATED, EXTENDED RELEASE ORAL at 11:18

## 2022-05-21 RX ADMIN — PANTOPRAZOLE SODIUM 40 MG: 40 TABLET, DELAYED RELEASE ORAL at 15:56

## 2022-05-21 RX ADMIN — AMLODIPINE BESYLATE 10 MG: 5 TABLET ORAL at 11:10

## 2022-05-21 RX ADMIN — ATORVASTATIN CALCIUM 10 MG: 10 TABLET, FILM COATED ORAL at 11:10

## 2022-05-21 RX ADMIN — PANTOPRAZOLE SODIUM 40 MG: 40 TABLET, DELAYED RELEASE ORAL at 06:45

## 2022-05-21 RX ADMIN — SODIUM CHLORIDE, PRESERVATIVE FREE 10 ML: 5 INJECTION INTRAVENOUS at 06:47

## 2022-05-21 RX ADMIN — SODIUM CHLORIDE, PRESERVATIVE FREE 10 ML: 5 INJECTION INTRAVENOUS at 15:53

## 2022-05-21 NOTE — PROGRESS NOTES
[address]  [address] [facility], [address]  [phone]                GI PROGRESS NOTE      NAME:   Troy Miller   :    1940   MRN:    [id number]     Subjective:     Pt stable. Tolerating full liquids. No bleeding or issues. Objective:     VITALS:   Last 24hrs VS reviewed since prior hospitalist progress note. Most recent are:  Visit Vitals  BP (!) 148/71 (BP 1 Location: Right upper arm, BP Patient Position: At rest;Supine)   Pulse 61   Temp 98 °F (36.7 °C)   Resp 17   Ht 5' 6\" (1.676 m)   Wt 63.5 kg (140 lb 1.6 oz)   SpO2 94%   BMI 22.61 kg/m²       Intake/Output Summary (Last 24 hours) at 2022 1219  Last data filed at 2022 1542  Gross per 24 hour   Intake 250 ml   Output --   Net 250 ml        PHYSICAL EXAM:  General   NAD  EENT  Normocephalic, Atraumatic, PERRLA, EOMI, sclera clear  Neck   Supple, No thyromegaly or bruit  Respiratory   Clear To Auscultation bilaterally - no wheezes, rales, rhonchi, or crackles  Cardiology  Regular Rate and Rythmn - no murmurs, rubs or gallops  Abdominal  Soft, non-tender, non-distended, positive bowel sounds, no hepatosplenomegaly  Extremities  No clubbing, cyanosis, or edema. Pulses intact. Back  No spinal or muscle pain. Skin  Normal skin turgor. No rashes or skin ulcers noted  Neurological  No focal neurological deficits noted  Psychological  Oriented x 3. Normal affect.      Lab Data Reviewed     Medications: Reviewed    EGD yesterday     Findings:   Esophagus: vegetable impaction mainly of broccoli in the distal esophagus-able to gently pass into the stomach with advancement of the scope, moderate benign appearing ring in the distal esophagus  Stomach: visible vessel without underlying ulcer seen in the cardia s/p 1 cc epinephrine injection at 4 quadrants around the vessel and placement of 1 hemoclip, mild diffuse erythema in the rest of the stomach  Duodenum: multiple clean based erosions with greatest diameter of 5 mm in the bulb and 2nd portion of the duodenum    Assessment/Plan     Chantelle Collins is a 79 y.o.  male who was admitted with food impaction. FLD today, soft mechanical diet tomorrow   H.  Pylori Ab pending  Avoid NSAIDs  PPI 40 mg BID  Resume Plavix   Outpatient follow up w Dr Jerrica Arriaga     Attending Physician: Karla Flor MD   Date/Time:  5/21/2022

## 2022-05-21 NOTE — PROGRESS NOTES
Bedside and Verbal shift change report given to Kwasi (oncoming nurse) by Adbias Wilde (offgoing nurse). Report included the following information SBAR, Kardex, ED Summary, Procedure Summary, Intake/Output, MAR, Recent Results and Cardiac Rhythm NSR/Sinus Bradycardia.

## 2022-05-21 NOTE — PROGRESS NOTES
[facility]                                                                                          Hospitalist Progress Note  Marla Cheatham MD  Answering service: [phone] from in house phone        Date of Service:  2022  NAME:  Gray Bloch  :  1940  MRN:  [id number]      Admission Summary:     Patient is a 79-year-old male with history of stroke with right-sided deficits, speech difficulty and dysphagia presenting to the ED with difficulty tolerating secretions and inability to swallow food.  Last night patient ate chicken and feels it was stuck in his throat.  ABCs intact.  Patient is able to clear his secretions orally but cannot swallow them. Per chart he was admitted - for left MCA stroke, s/p TPA, then has Left CEA  by vascular surgeon. He is discharged with po asa and plavix. Diet was advanced to regular. He completed his acute rehab in [facility] and was just discharged home yesterday. He was taken to Endo urgent for endoscopy for food impaction: cleared food impaction, also noticed visible vessel without underlying ulcer in stomach, s/p epi injection and hemoclip. GI recommended to hold plavix for 2 days and monitor for bleeding.      Interval history / Subjective:     He said he feels better, no vomiting, cough, chest pain or shortness of breath     Assessment & Plan:     Dysphagia with food impaction   -s/p EGD   Esophagus: vegetable impaction mainly of broccoli in the distal esophagus-able to gently pass into the stomach with advancement of the scope, moderate benign appearing ring in the distal esophagus  Stomach: visible vessel without underlying ulcer seen in the cardia s/p 1 cc epinephrine injection at 4 quadrants around the vessel and placement of 1 hemoclip, mild diffuse erythema in the rest of the stomach  Duodenum: multiple clean based erosions with greatest diameter of 5 mm in the bulb and 2nd portion of the duodenum  -on clear liquid diet, advance to full liquid diet   -continue protonix, prn zofran  -consult to speech   -GI on board    Hx of L MCA infarct s/p tpA on 4/36 with residual right sided weakness and aphasia   -on aspirin, add plavix  -no evidence of bleeding   -stable    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 Most recent are:  Visit Vitals  BP (!) 149/90   Pulse 64   Temp 97.8 °F (36.6 °C)   Resp 17   Ht 5' 6\" (1.676 m)   Wt 63.5 kg (140 lb 1.6 oz)   SpO2 97%   BMI 22.61 kg/m²         Intake/Output Summary (Last 24 hours) at 5/21/2022 1345  Last data filed at 5/20/2022 1542  Gross per 24 hour   Intake 250 ml   Output --   Net 250 ml        Physical Examination:     I had a face to face encounter with this patient and independently examined them on 5/21/2022 as outlined below:          Constitutional:  No acute distress, cooperative, pleasant    ENT:  Oral mucosa moist, oropharynx benign. Resp:  CTA bilaterally. No wheezing/rhonchi/rales. No accessory muscle use. CV:  Regular rhythm, normal rate, no murmurs, gallops, rubs    GI:  Soft, non distended, non tender. normoactive bowel sounds, no hepatosplenomegaly     Musculoskeletal:  No edema     Neurologic:  Moves all extremities.   AAOx3, CN II-XII reviewed            Data Review:    Review and/or order of clinical lab test  Review and/or order of tests in the radiology section of CPT  Review and/or order of tests in the medicine section of CPT      Labs:     Recent Labs     05/21/22  0237 05/20/22  1014   WBC 7.3 7.5   HGB 13.9 15.5   HCT 41.7 46.2    164     Recent Labs     05/21/22  0237 05/20/22  1014    140   K 3.4* 3.6   * 110*   CO2 24 24   BUN 14 15   CREA 1.18 1.33*   GLU 96 111*   CA 9.5 11.1*     Recent Labs     05/20/22  1014   ALT 47   AP 96   TBILI 1.1*   TP 7.0   ALB 3.8   GLOB 3.2     No results for input(s): INR, PTP, APTT, INREXT in the last 72 hours. No results for input(s): FE, TIBC, PSAT, FERR in the last 72 hours. No results found for: FOL, RBCF   No results for input(s): PH, PCO2, PO2 in the last 72 hours. No results for input(s): CPK, CKNDX, TROIQ in the last 72 hours.     No lab exists for component: CPKMB  Lab Results   Component Value Date/Time    Cholesterol, total 187 04/27/2022 04:28 AM    HDL Cholesterol 66 04/27/2022 04:28 AM    LDL, calculated 107.2 (H) 04/27/2022 04:28 AM    Triglyceride 69 04/27/2022 04:28 AM    CHOL/HDL Ratio 2.8 04/27/2022 04:28 AM     Lab Results   Component Value Date/Time    Glucose (POC) 97 04/26/2022 05:12 PM    Glucose (POC) 94 02/13/2018 07:20 AM     Lab Results   Component Value Date/Time    Color YELLOW/STRAW 01/29/2018 08:49 AM    Appearance CLEAR 01/29/2018 08:49 AM    Specific gravity 1.019 01/29/2018 08:49 AM    pH (UA) 6.0 01/29/2018 08:49 AM    Protein NEGATIVE  01/29/2018 08:49 AM    Glucose NEGATIVE  01/29/2018 08:49 AM    Ketone NEGATIVE  01/29/2018 08:49 AM    Bilirubin NEGATIVE  01/29/2018 08:49 AM    Urobilinogen 0.2 01/29/2018 08:49 AM    Nitrites NEGATIVE  01/29/2018 08:49 AM    Leukocyte Esterase NEGATIVE  01/29/2018 08:49 AM    Epithelial cells FEW 01/29/2018 08:49 AM    Bacteria NEGATIVE  01/29/2018 08:49 AM    WBC 0-4 01/29/2018 08:49 AM    RBC 0-5 01/29/2018 08:49 AM         Medications Reviewed:     Current Facility-Administered Medications   Medication Dose Route Frequency    sodium chloride (NS) flush 5-40 mL  5-40 mL IntraVENous Q8H    sodium chloride (NS) flush 5-40 mL  5-40 mL IntraVENous PRN    sodium chloride (NS) flush 5-40 mL  5-40 mL IntraVENous PRN    pantoprazole (PROTONIX) tablet 40 mg  40 mg Oral ACB&D    sodium chloride (NS) flush 5-40 mL  5-40 mL IntraVENous Q8H    sodium chloride (NS) flush 5-40 mL  5-40 mL IntraVENous PRN    acetaminophen (TYLENOL) tablet 650 mg  650 mg Oral Q6H PRN    Or    acetaminophen (TYLENOL) suppository 650 mg  650 mg Rectal Q6H PRN    polyethylene glycol (MIRALAX) packet 17 g  17 g Oral DAILY PRN    ondansetron (ZOFRAN ODT) tablet 4 mg  4 mg Oral Q8H PRN    Or    ondansetron (ZOFRAN) injection 4 mg  4 mg IntraVENous Q6H PRN    atorvastatin (LIPITOR) tablet 10 mg  10 mg Oral DAILY    traMADoL (ULTRAM) tablet 25-50 mg  25-50 mg Oral Q6H PRN    amLODIPine (NORVASC) tablet 10 mg  10 mg Oral DAILY    aspirin chewable tablet 81 mg  81 mg Oral DAILY     ______________________________________________________________________  EXPECTED LENGTH OF STAY: - - -  ACTUAL LENGTH OF STAY:          1                 Raeann Ledezma MD

## 2022-05-22 VITALS
TEMPERATURE: 98.6 F | HEIGHT: 66 IN | HEART RATE: 59 BPM | SYSTOLIC BLOOD PRESSURE: 164 MMHG | OXYGEN SATURATION: 97 % | BODY MASS INDEX: 22.52 KG/M2 | DIASTOLIC BLOOD PRESSURE: 80 MMHG | RESPIRATION RATE: 17 BRPM | WEIGHT: 140.1 LBS

## 2022-05-22 PROCEDURE — 74011250637 HC RX REV CODE- 250/637: Performed by: HOSPITALIST

## 2022-05-22 PROCEDURE — 74011250637 HC RX REV CODE- 250/637: Performed by: INTERNAL MEDICINE

## 2022-05-22 RX ORDER — PANTOPRAZOLE SODIUM 40 MG/1
40 TABLET, DELAYED RELEASE ORAL
Qty: 30 TABLET | Refills: 0 | Status: SHIPPED | OUTPATIENT
Start: 2022-05-22

## 2022-05-22 RX ADMIN — ATORVASTATIN CALCIUM 10 MG: 10 TABLET, FILM COATED ORAL at 10:08

## 2022-05-22 RX ADMIN — POTASSIUM BICARBONATE 20 MEQ: 782 TABLET, EFFERVESCENT ORAL at 10:08

## 2022-05-22 RX ADMIN — PANTOPRAZOLE SODIUM 40 MG: 40 TABLET, DELAYED RELEASE ORAL at 06:54

## 2022-05-22 RX ADMIN — CLOPIDOGREL BISULFATE 75 MG: 75 TABLET ORAL at 10:08

## 2022-05-22 RX ADMIN — ASPIRIN 81 MG 81 MG: 81 TABLET ORAL at 10:08

## 2022-05-22 NOTE — DISCHARGE SUMMARY
Discharge Summary       PATIENT ID: Wm Magana  MRN: [id number]   YOB: 1940    DATE OF ADMISSION: 5/20/2022  9:33 AM    DATE OF DISCHARGE: 5/22/2022   PRIMARY CARE PROVIDER: Nesha Suarez MD     ATTENDING PHYSICIAN: Chetna Charles MD   DISCHARGING PROVIDER: Elio Meigs, MD    To contact this individual call [phone] and ask the  to page. If unavailable ask to be transferred the Adult Hospitalist Department. CONSULTATIONS: IP CONSULT TO GASTROENTEROLOGY    PROCEDURES/SURGERIES: Procedure(s):  ESOPHAGOGASTRODUODENOSCOPY (EGD)  ENDOSCOPIC FOREIGN BODY REMOVAL  INJECTION  RESOLUTION CLIP    ADMISSION SUMMARY AND HOSPITAL COURSE:     Patient is a 50-year-old male with history of stroke with right-sided deficits, speech difficulty and dysphagia presenting to the ED with difficulty tolerating secretions and inability to swallow food.  Last night patient ate chicken and feels it was stuck in his throat.  ABCs intact.  Patient is able to clear his secretions orally but cannot swallow them. Per chart he was admitted 4/26-5/7 for left MCA stroke, s/p TPA, then has Left CEA 5/6 by vascular surgeon. He is discharged with po asa and plavix. Diet was advanced to regular. Divine Ruiz completed his acute rehab in [facility] and was just discharged home yesterday. He was taken to Endo urgent for endoscopy for food impaction: cleared food impaction, also noticed visible vessel without underlying ulcer in stomach, s/p epi injection and hemoclip.  GI recommended to hold plavix for 2 days and monitor for bleeding.      Dysphagia with food impaction, Resolved   -s/p EGD 5/20  Esophagus: vegetable impaction mainly of broccoli in the distal esophagus-able to gently pass into the stomach with advancement of the scope, moderate benign appearing ring in the distal esophagus  Stomach: visible vessel without underlying ulcer seen in the cardia s/p 1 cc epinephrine injection at 4 quadrants around the vessel and placement of 1 hemoclip, mild diffuse erythema in the rest of the stomach  Duodenum: multiple clean based erosions with greatest diameter of 5 mm in the bulb and 2nd portion of the duodenum  -on dysphagia diet and tolerated it  -continue protonix, prn zofran  -seen by speech   -GI on board  Hx of L MCA infarct s/p tpA on 4/36 with residual right sided weakness and aphasia   -on aspirin, add plavix  -no evidence of bleeding   -stable  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   -continue aspirin, add plavix  HTN  -BP not at goal, continue norvasc, monitor pulse ox  Hypokalemia   -replace with kcl        Code status:DNR  Prophylaxis: SCD  Care Plan discussed with: Patient and Nurse   Anticipated Disposition: Home with family    DISCHARGE DIAGNOSES / PLAN:      Dysphagia with food impaction- resolved  -continue dysphagia diet and tolerated it  -continue protonix, prn zofran  Multiple duodenal erosions on EGD  -continue protonix  -H Pylori ab pending  -outpatient follow up with GI  Hx of L MCA infarct s/p tpA on 4/36 with residual right sided weakness and aphasia   -continue aspirin, add plavix  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 Chest pain, shortness of breath, fever, chills, nausea, vomiting, diarrhea, change in mentation, falling, weakness, bleeding. Severe pain or pain not relieved by medications, as well as any other signs or symptoms that you may have questions about. FOLLOW UP APPOINTMENTS:    Follow-up Information     Follow up With Specialties Details Why Contact Info   1. Ariane Valero MD Internal Medicine Physician In one week   [address] [facility] [address] ,[place] [address]  [phone]         2.  Wm Sotomayor MD, Gastroenterologist in 2-3 weeks    DIET: Dysphagia diet     ACTIVITY: Activity as tolerated    EQUIPMENT needed: None    DISCHARGE MEDICATIONS:  Current Discharge Medication List      START taking these medications    Details   pantoprazole (PROTONIX) 40 mg tablet Take 1 Tablet by mouth Before breakfast and dinner. Qty: 30 Tablet, Refills: 0  Start date: 5/22/2022         CONTINUE these medications which have NOT CHANGED    Details   clopidogreL (PLAVIX) 75 mg tab Take 1 Tablet by mouth daily for 30 days. Qty: 30 Tablet, Refills: 0      aspirin 81 mg chewable tablet Take 1 Tablet by mouth daily for 30 days. Qty: 30 Tablet, Refills: 0      acetaminophen (TYLENOL) 500 mg tablet Take 1 Tab by mouth every four (4) hours (while awake). Schedule for pain control over the next 7-14 days. Do not exceed 3000 mg in 24 hours. Obtain over-the-counter. Indications: Postoperative Incisional Knee Pain  Qty: 60 Tab, Refills: 0      oxyCODONE IR (ROXICODONE) 5 mg immediate release tablet Take 0.5-1 Tabs by mouth every four (4) hours as needed (Severe pain not relieved by tramadol (Ultram). Take with food & fluids. ). Max Daily Amount: 30 mg. Indications: Severe Incisional Knee Pain  Qty: 20 Tab, Refills: 0    Associated Diagnoses: Primary localized osteoarthritis of right knee      traMADol (ULTRAM) 50 mg tablet Take 0.5-1 Tabs by mouth every six (6) hours as needed. Max Daily Amount: 200 mg. Indications: Postoperative Incisional Knee Pain  Qty: 60 Tab, Refills: 0    Associated Diagnoses: Primary localized osteoarthritis of right knee      potassium chloride (K-DUR, KLOR-CON) 20 mEq tablet Take 40 mEq by mouth daily. atorvastatin (LIPITOR) 10 mg tablet Take 10 mg by mouth daily. amlodipine (NORVASC) 10 mg tablet Take 10 mg by mouth daily. hydrochlorothiazide (HYDRODIURIL) 25 mg tablet Take 25 mg by mouth daily.              DISPOSITION:    Home With:   OT  PT  SAMI  RN       Long term SNF/Inpatient Rehab   x Independent/assisted living    Hospice Other: PATIENT CONDITION AT DISCHARGE:     Functional status    Poor     Deconditioned    x Independent      Cognition   x  Lucid     Forgetful     Dementia      Catheters/lines (plus indication)    Anders     PICC     PEG    x None      Code status     Full code    x DNR      PHYSICAL EXAMINATION AT DISCHARGE:  Patient Vitals for the past 24 hrs:   Temp Pulse Resp BP SpO2   05/22/22 1128 98.6 °F (37 °C) (!) 59 17 (!) 164/80 97 %   05/22/22 1000 -- (!) 59 -- -- --   05/22/22 0705 98.6 °F (37 °C) (!) 55 15 (!) 170/71 96 %   05/22/22 0400 -- (!) 52 -- -- --   05/22/22 0150 98.4 °F (36.9 °C) (!) 57 16 (!) 149/81 95 %   05/21/22 2200 -- (!) 48 -- -- --   05/21/22 2109 98.3 °F (36.8 °C) (!) 55 18 (!) 155/71 94 %   05/21/22 2000 -- (!) 59 -- -- --   05/21/22 1800 -- 77 -- -- --   05/21/22 1550 98.1 °F (36.7 °C) 62 19 135/68 95 %     General:          Alert, cooperative, no distress, appears stated age. HEENT:           Atraumatic, anicteric sclerae, pink conjunctivae                          No oral ulcers, mucosa moist, throat clear, dentition fair  Neck:               Supple, symmetrical  Lungs:             Clear to auscultation bilaterally. No Wheezing or Rhonchi. No rales. Chest wall:      No tenderness  No Accessory muscle use. Heart:              Regular  rhythm,  No  murmur   No edema  Abdomen:        Soft, non-tender. Not distended. Bowel sounds normal  Extremities:     No cyanosis. No clubbing,                            Skin turgor normal, Capillary refill normal  Skin:                Not pale. Not Jaundiced  No rashes   Psych:             Not anxious or agitated.   Neurologic:      Alert, moves all extremities, answers questions appropriately and responds to commands     CHRONIC MEDICAL DIAGNOSES:  Problem List as of 5/22/2022 Date Reviewed: 5/20/2022          Codes Class Noted - Resolved    Food impaction of esophagus ICD-10-CM: I07.590W  ICD-9-CM: 935.1  5/20/2022 - Present        CVA (cerebral vascular accident) [facility]) ICD-10-CM: I63.9  ICD-9-CM: 434.91  4/26/2022 - Present        Primary localized osteoarthritis of right knee ICD-10-CM: M17.11  ICD-9-CM: 715.16  2/13/2018 - Present              Greater than 45 minutes were spent with the patient on counseling and coordination of care    Signed:   Magali Canseco MD  5/22/2022  11:54 AM

## 2022-05-22 NOTE — PROGRESS NOTES
SLP Contact Note    Consult received and appreciated. Pt chart reviewed. Pt with food bolus impaction that has been addressed. Seen by this SLP in previous admission 5/3 with no difficulty/dysphagia noted. GI is on board and managing. No further indication for SLP.       Thank you,  ABIOLA Webber.Ed, [id number] [facility]  Speech-Language Pathologist

## 2022-05-22 NOTE — DISCHARGE INSTRUCTIONS
Discharge Instructions       PATIENT ID: Anisha Segundo  MRN: [id number]   YOB: 1940    DATE OF ADMISSION: 5/20/2022  9:33 AM    DATE OF DISCHARGE: 5/22/2022    PRIMARY CARE PROVIDER: Prasanna Stiles MD     ATTENDING PHYSICIAN: Michael Kay MD  DISCHARGING PROVIDER: Alfredo France MD    To contact this individual call [phone] and ask the  to page. If unavailable ask to be transferred the Adult Hospitalist Department. 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 Prasanna Stiles MD Internal Medicine Physician In one week  [address] [facility] [address] [place] [address]  [phone]         2. Cindy Kapoor MD, Gastroenterologist in 2-3 weeks    ADDITIONAL CARE RECOMMENDATIONS:      DIET: Dysphagia diet     ACTIVITY: Activity as tolerated    WOUND CARE: None    EQUIPMENT needed: None      DISCHARGE MEDICATIONS:   See Medication Reconciliation Form    · It is important that you take the medication exactly as they are prescribed. · Keep your medication in the bottles provided by the pharmacist and keep a list of the medication names, dosages, and times to be taken in your wallet. · Do not take other medications without consulting your doctor. NOTIFY YOUR PHYSICIAN FOR ANY OF THE FOLLOWING:   Fever over 101 degrees for 24 hours.    Chest pain, shortness of breath, fever, chills, nausea, vomiting, diarrhea, change in mentation, falling, weakness, bleeding. Severe pain or pain not relieved by medications. Or, any other signs or symptoms that you may have questions about.       DISPOSITION:    Home With:   OT  PT  HH  RN       SNF/Inpatient Rehab/LTAC   x Independent/assisted living    Hospice    Other:     CDMP Checked:   Yes x     PROBLEM LIST Updated:  Yes x       Signed:   Milan Landeros MD  5/22/2022  9:54 PM

## 2022-05-22 NOTE — PROGRESS NOTES
[facility]                                                                                          Hospitalist Progress Note  Princess Radha MD  Answering service: [phone] from in house phone        Date of Service:  2022  NAME:  Loan Lora  :  1940  MRN:  [id number]      Admission Summary:     Patient is a 79-year-old male with history of stroke with right-sided deficits, speech difficulty and dysphagia presenting to the ED with difficulty tolerating secretions and inability to swallow food.  Last night patient ate chicken and feels it was stuck in his throat.  ABCs intact.  Patient is able to clear his secretions orally but cannot swallow them. Per chart he was admitted - for left MCA stroke, s/p TPA, then has Left CEA  by vascular surgeon. He is discharged with po asa and plavix. Diet was advanced to regular. He completed his acute rehab in [facility] and was just discharged home yesterday. He was taken to Endo urgent for endoscopy for food impaction: cleared food impaction, also noticed visible vessel without underlying ulcer in stomach, s/p epi injection and hemoclip. GI recommended to hold plavix for 2 days and monitor for bleeding.      Interval history / Subjective:     He said he feels better, no vomiting, cough, chest pain or shortness of breath     Assessment & Plan:     Dysphagia with food impaction   -s/p EGD   Esophagus: vegetable impaction mainly of broccoli in the distal esophagus-able to gently pass into the stomach with advancement of the scope, moderate benign appearing ring in the distal esophagus  Stomach: visible vessel without underlying ulcer seen in the cardia s/p 1 cc epinephrine injection at 4 quadrants around the vessel and placement of 1 hemoclip, mild diffuse erythema in the rest of the stomach  Duodenum: multiple clean based erosions with greatest diameter of 5 mm in the bulb and 2nd portion of the duodenum  -on dysphagia diet and tolerated it  -continue protonix, prn zofran  -seen by speech   -GI on board    Hx of L MCA infarct s/p tpA on 4/36 with residual right sided weakness and aphasia   -on aspirin, add plavix  -no evidence of bleeding   -stable    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 Most recent are:  Visit Vitals  BP (!) 164/80 (BP 1 Location: Right arm, BP Patient Position: At rest;Lying)   Pulse (!) 59   Temp 98.6 °F (37 °C)   Resp 17   Ht 5' 6\" (1.676 m)   Wt 63.5 kg (140 lb 1.6 oz)   SpO2 97%   BMI 22.61 kg/m²       No intake or output data in the 24 hours ending 05/22/22 1130     Physical Examination:     I had a face to face encounter with this patient and independently examined them on 5/22/2022 as outlined below:          Constitutional:  No acute distress, cooperative, pleasant    ENT:  Oral mucosa moist, oropharynx benign. Resp:  CTA bilaterally. No wheezing/rhonchi/rales. No accessory muscle use. CV:  Regular rhythm, normal rate, no murmurs, gallops, rubs    GI:  Soft, non distended, non tender. normoactive bowel sounds, no hepatosplenomegaly     Musculoskeletal:  No edema     Neurologic:  Moves all extremities.   AAOx3, CN II-XII reviewed            Data Review:    Review and/or order of clinical lab test  Review and/or order of tests in the radiology section of CPT  Review and/or order of tests in the medicine section of [facility]      Labs:     Recent Labs     05/21/22  0237 05/20/22  1014   WBC 7.3 7.5   HGB 13.9 15.5   HCT 41.7 46.2    164     Recent Labs     05/21/22  0237 05/20/22  1014    140   K 3.4* 3.6   * 110*   CO2 24 24   BUN 14 15   CREA 1.18 1.33*   GLU 96 111*   CA 9.5 11.1*     Recent Labs     05/20/22  1014   ALT 47   AP 96   TBILI 1.1*   TP 7.0   ALB 3.8   GLOB 3.2     No results for input(s): INR, PTP, APTT, INREXT, INREXT in the last 72 hours. No results for input(s): FE, TIBC, PSAT, FERR in the last 72 hours. No results found for: FOL, RBCF   No results for input(s): PH, PCO2, PO2 in the last 72 hours. No results for input(s): CPK, CKNDX, TROIQ in the last 72 hours.     No lab exists for component: CPKMB  Lab Results   Component Value Date/Time    Cholesterol, total 187 04/27/2022 04:28 AM    HDL Cholesterol 66 04/27/2022 04:28 AM    LDL, calculated 107.2 (H) 04/27/2022 04:28 AM    Triglyceride 69 04/27/2022 04:28 AM    CHOL/HDL Ratio 2.8 04/27/2022 04:28 AM     Lab Results   Component Value Date/Time    Glucose (POC) 97 04/26/2022 05:12 PM    Glucose (POC) 94 02/13/2018 07:20 AM     Lab Results   Component Value Date/Time    Color YELLOW/STRAW 01/29/2018 08:49 AM    Appearance CLEAR 01/29/2018 08:49 AM    Specific gravity 1.019 01/29/2018 08:49 AM    pH (UA) 6.0 01/29/2018 08:49 AM    Protein NEGATIVE  01/29/2018 08:49 AM    Glucose NEGATIVE  01/29/2018 08:49 AM    Ketone NEGATIVE  01/29/2018 08:49 AM    Bilirubin NEGATIVE  01/29/2018 08:49 AM    Urobilinogen 0.2 01/29/2018 08:49 AM    Nitrites NEGATIVE  01/29/2018 08:49 AM    Leukocyte Esterase NEGATIVE  01/29/2018 08:49 AM    Epithelial cells FEW 01/29/2018 08:49 AM    Bacteria NEGATIVE  01/29/2018 08:49 AM    WBC 0-4 01/29/2018 08:49 AM    RBC 0-5 01/29/2018 08:49 AM         Medications Reviewed:     Current Facility-Administered Medications   Medication Dose Route Frequency    clopidogreL (PLAVIX) tablet 75 mg  75 mg Oral DAILY    sodium chloride (NS) flush 5-40 mL  5-40 mL IntraVENous Q8H    sodium chloride (NS) flush 5-40 mL  5-40 mL IntraVENous PRN    sodium chloride (NS) flush 5-40 mL  5-40 mL IntraVENous PRN    pantoprazole (PROTONIX) tablet 40 mg  40 mg Oral ACB&D    sodium chloride (NS) flush 5-40 mL  5-40 mL IntraVENous Q8H    sodium chloride (NS) flush 5-40 mL  5-40 mL IntraVENous PRN    acetaminophen (TYLENOL) tablet 650 mg  650 mg Oral Q6H PRN    Or    acetaminophen (TYLENOL) suppository 650 mg  650 mg Rectal Q6H PRN    polyethylene glycol (MIRALAX) packet 17 g  17 g Oral DAILY PRN    ondansetron (ZOFRAN ODT) tablet 4 mg  4 mg Oral Q8H PRN    Or    ondansetron (ZOFRAN) injection 4 mg  4 mg IntraVENous Q6H PRN    atorvastatin (LIPITOR) tablet 10 mg  10 mg Oral DAILY    traMADoL (ULTRAM) tablet 25-50 mg  25-50 mg Oral Q6H PRN    amLODIPine (NORVASC) tablet 10 mg  10 mg Oral DAILY    aspirin chewable tablet 81 mg  81 mg Oral DAILY     ______________________________________________________________________  EXPECTED LENGTH OF STAY: - - -  ACTUAL LENGTH OF STAY:          2                 Roberto Drummond MD

## 2022-05-23 ENCOUNTER — PATIENT OUTREACH (OUTPATIENT)
Dept: CASE MANAGEMENT | Age: 82
End: 2022-05-23

## 2022-05-23 LAB
H PYLORI IGG SER IA-ACNC: 0.39 INDEX VALUE (ref 0–0.79)
H PYLORI IGM SER-ACNC: <9 UNITS (ref 0–8.9)

## 2022-05-23 NOTE — PROGRESS NOTES
Care Transitions Initial Call    2022  Home phone answered by family/friend, they report patient not home and is currently at [facility] office visit. CTN will attempt to call patient back later today. Spoke with patient who requested information be reviewed with his daughter, Agustina Davis. CTN contacted [facility] to confirm medications at discharge. 2022  CTN s/w patient's daughter- Agustina Davis who reports patient is currently sleeping. CTN left contact info for patient to return call. Call within 2 business days of discharge: Yes     Patient: Verner Picker Patient : 1940 MRN: [id number]    Last Discharge [address]       Complaint Diagnosis Description Type Department Provider    22 Aphagia Dysphagia, unspecified type ED to Hosp-Admission (Discharged) Diana Butler, Tanesha Pimentel MD; Jimi Valle. .. Was this an external facility discharge? No Discharge Facility: n/a      Discussed COVID-19 related testing which was not done at this time. Test results were not done. Patient informed of results, if available? n/a     Advance Care Planning:   Does patient have an Advance Directive: yes, reviewed and current. Inpatient Readmission Risk score: Unplanned Readmit Risk Score: 12.2 ( )    Was this a readmission? yes   Patient stated reason for the admission: food impaction    Patients top risk factors for readmission: functional cognitive ability, functional physical ability and medical condition-recent stroke   Interventions to address risk factors: Obtained and reviewed discharge summary and/or continuity of care documents    Care Transition Nurse (CTN) contacted the patient by telephone to perform post hospital discharge assessment. Verified name and  with patient as identifiers. Provided introduction to self, and explanation of the CTN role. Patient had difficulty remembering his date of birth. He request CTN speak with his daughter, Agustina Davis.     CTN reviewed discharge instructions, medical action plan and red flags with family who verbalized understanding. Were discharge instructions available to patient? yes. Medication reconciliation was performed with family, who verbalizes understanding of administration of home medications. Patient had recent changes to medication list. Will confirm home medication from [facility] discharge. LVMM with CM at [facility]  Referral to Pharm D needed: no     Home Health/Outpatient orders at discharge: none     Discussed follow-up appointments. If no appointment was previously scheduled, appointment scheduling offered: n/a. Is follow up appointment scheduled within 7 days of discharge? yes. [facility] follow up appointment(s): No future appointments. Non-[facility] follow up appointment(s):   Cardio/vasc surgery appt today, 5/24/2022  KIMBERLY Benton [address] [place]- 2-3 weeks    Plan for follow-up call in 5-7 days based on severity of symptoms and risk factors. CTN provided contact information for future needs. Goals Addressed                 This Visit's Progress     Prevent complications post hospitalization. 5/24/2022  Patient doing well. Eating soft moist food. Chewing food well. Sitting up during eating and after meal for at least 30 minutes. Medication compliance- med changes during rehab stay. CTN reached out to [facility] to confirm discharge medications, LVMM. Family is supportive and able to assist with medications. Patient daughter is NP and has MPOA per daughter, Sandhya Dominguez. Family voices no concerns at this time.

## 2022-05-24 RX ORDER — ISOSORBIDE MONONITRATE 30 MG/1
TABLET, EXTENDED RELEASE ORAL EVERY MORNING
COMMUNITY

## 2022-05-24 RX ORDER — LOSARTAN POTASSIUM 25 MG/1
TABLET ORAL
COMMUNITY

## 2022-05-24 RX ORDER — BACLOFEN 10 MG/1
5 TABLET ORAL
COMMUNITY

## 2022-06-07 ENCOUNTER — TRANSCRIBE ORDER (OUTPATIENT)
Dept: SCHEDULING | Age: 82
End: 2022-06-07

## 2022-06-07 DIAGNOSIS — R60.0 UNILATERAL EDEMA OF LOWER EXTREMITY: Primary | ICD-10-CM

## 2022-06-08 ENCOUNTER — HOSPITAL ENCOUNTER (EMERGENCY)
Age: 82
Discharge: HOME | End: 2022-06-08
Attending: EMERGENCY MEDICINE | Admitting: EMERGENCY MEDICINE
Payer: MEDICARE

## 2022-06-08 ENCOUNTER — HOSPITAL ENCOUNTER (OUTPATIENT)
Dept: ULTRASOUND IMAGING | Age: 82
Discharge: HOME | End: 2022-06-08
Attending: INTERNAL MEDICINE
Payer: MEDICARE

## 2022-06-08 VITALS
DIASTOLIC BLOOD PRESSURE: 73 MMHG | HEART RATE: 57 BPM | RESPIRATION RATE: 16 BRPM | SYSTOLIC BLOOD PRESSURE: 178 MMHG | OXYGEN SATURATION: 98 % | TEMPERATURE: 98 F

## 2022-06-08 DIAGNOSIS — I82.401 ACUTE DEEP VEIN THROMBOSIS (DVT) OF RIGHT LOWER EXTREMITY, UNSPECIFIED VEIN (HCC): Primary | ICD-10-CM

## 2022-06-08 DIAGNOSIS — R60.0 UNILATERAL EDEMA OF LOWER EXTREMITY: ICD-10-CM

## 2022-06-08 LAB
ALBUMIN SERPL-MCNC: 3.6 G/DL (ref 3.5–5)
ALBUMIN/GLOB SERPL: 1.2 {RATIO} (ref 1.1–2.2)
ALP SERPL-CCNC: 94 U/L (ref 45–117)
ALT SERPL-CCNC: 14 U/L (ref 12–78)
ANION GAP SERPL CALC-SCNC: 3 MMOL/L (ref 5–15)
AST SERPL-CCNC: 11 U/L (ref 15–37)
BASOPHILS # BLD: 0 K/UL (ref 0–0.1)
BASOPHILS NFR BLD: 0 % (ref 0–1)
BILIRUB SERPL-MCNC: 0.6 MG/DL (ref 0.2–1)
BUN SERPL-MCNC: 14 MG/DL (ref 6–20)
BUN/CREAT SERPL: 10 (ref 12–20)
CALCIUM SERPL-MCNC: 10.3 MG/DL (ref 8.5–10.1)
CHLORIDE SERPL-SCNC: 111 MMOL/L (ref 97–108)
CO2 SERPL-SCNC: 27 MMOL/L (ref 21–32)
COMMENT, HOLDF: NORMAL
CREAT SERPL-MCNC: 1.38 MG/DL (ref 0.7–1.3)
DIFFERENTIAL METHOD BLD: ABNORMAL
EOSINOPHIL # BLD: 0.1 K/UL (ref 0–0.4)
EOSINOPHIL NFR BLD: 2 % (ref 0–7)
ERYTHROCYTE [DISTWIDTH] IN BLOOD BY AUTOMATED COUNT: 13.5 % (ref 11.5–14.5)
GLOBULIN SER CALC-MCNC: 2.9 G/DL (ref 2–4)
GLUCOSE SERPL-MCNC: 109 MG/DL (ref 65–100)
HCT VFR BLD AUTO: 41.7 % (ref 36.6–50.3)
HGB BLD-MCNC: 14.1 G/DL (ref 12.1–17)
IMM GRANULOCYTES # BLD AUTO: 0 K/UL (ref 0–0.04)
IMM GRANULOCYTES NFR BLD AUTO: 0 % (ref 0–0.5)
INR PPP: 1 (ref 0.9–1.1)
LYMPHOCYTES # BLD: 0.9 K/UL (ref 0.8–3.5)
LYMPHOCYTES NFR BLD: 15 % (ref 12–49)
MCH RBC QN AUTO: 32 PG (ref 26–34)
MCHC RBC AUTO-ENTMCNC: 33.8 G/DL (ref 30–36.5)
MCV RBC AUTO: 94.6 FL (ref 80–99)
MONOCYTES # BLD: 0.8 K/UL (ref 0–1)
MONOCYTES NFR BLD: 14 % (ref 5–13)
NEUTS SEG # BLD: 4 K/UL (ref 1.8–8)
NEUTS SEG NFR BLD: 69 % (ref 32–75)
NRBC # BLD: 0 K/UL (ref 0–0.01)
NRBC BLD-RTO: 0 PER 100 WBC
PLATELET # BLD AUTO: 166 K/UL (ref 150–400)
PMV BLD AUTO: 10.7 FL (ref 8.9–12.9)
POTASSIUM SERPL-SCNC: 3.6 MMOL/L (ref 3.5–5.1)
PROT SERPL-MCNC: 6.5 G/DL (ref 6.4–8.2)
PROTHROMBIN TIME: 10.9 SEC (ref 9–11.1)
RBC # BLD AUTO: 4.41 M/UL (ref 4.1–5.7)
SAMPLES BEING HELD,HOLD: NORMAL
SODIUM SERPL-SCNC: 141 MMOL/L (ref 136–145)
WBC # BLD AUTO: 5.8 K/UL (ref 4.1–11.1)

## 2022-06-08 PROCEDURE — 85610 PROTHROMBIN TIME: CPT

## 2022-06-08 PROCEDURE — 99283 EMERGENCY DEPT VISIT LOW MDM: CPT

## 2022-06-08 PROCEDURE — 36415 COLL VENOUS BLD VENIPUNCTURE: CPT

## 2022-06-08 PROCEDURE — 85025 COMPLETE CBC W/AUTO DIFF WBC: CPT

## 2022-06-08 PROCEDURE — 80053 COMPREHEN METABOLIC PANEL: CPT

## 2022-06-08 PROCEDURE — 93971 EXTREMITY STUDY: CPT

## 2022-06-08 RX ORDER — APIXABAN 5 MG (74)
KIT ORAL
Qty: 1 DOSE PACK | Refills: 0 | Status: SHIPPED | OUTPATIENT
Start: 2022-06-08

## 2022-06-08 RX ORDER — APIXABAN 5 MG (74)
KIT ORAL
Qty: 1 DOSE PACK | Refills: 0 | OUTPATIENT
Start: 2022-06-08 | End: 2022-06-08 | Stop reason: SDUPTHER

## 2022-06-08 NOTE — DISCHARGE INSTRUCTIONS
You may get your prescription filled at the 24 hour [facility]  [address] [facility], [address]    Stop Taking your Plavix.

## 2022-06-08 NOTE — ED TRIAGE NOTES
Pt presents to department with a CC of referral for blood clot in his left leg. Pt had dopplar study performed here PTA for pain in his left leg x 1 month. Pt reports being on a blood thinner but unsure of which one.

## 2022-06-08 NOTE — ED PROVIDER NOTES
Loc Colunga is an 80 yo M with h/o recent stroke who had endarterectomy on 5/6. He states that since his stroke he has had pain in his right leg. He went to his PCP who referred him for an outpatient vascular study which he had today. It was positive for DVT and so vascular referred the patient to the ED. HE denies chest pain or shrotness of breath. Past Medical History:   Diagnosis Date    Ankle fracture 3/2000    Right    Arthritis     Cancer (HCC)     SCCA    Hypercholesterolemia     Hypertension     Personal history of kidney stones 1968    Prostatitis 2001    Psychiatric disorder     CLAUSTROPHOBIA; MASK OVER FACE WOULD CAUSE ANXIETY; HAS REACTED TO TIGHT SPACE (UNDER HOUSE)    Unspecified adverse effect of anesthesia     mother difficult to arouse post anesthesia       Past Surgical History:   Procedure Laterality Date    HX COLONOSCOPY      HX HERNIA REPAIR  4467    UMBILICAL    HX ORTHOPAEDIC Right 2000    ORIF ANKLE WITH PINS    HX ORTHOPAEDIC Right 2017    FOOT SURGERY    HX ORTHOPAEDIC Left 1980s    FOOT SURGERY         Family History:   Problem Relation Age of Onset    Dementia Mother     Anesth Problems Mother         DIFFICULT TO AROUSE; DID NOT INVOLVE LENGTHY VENTILATOR USE.  Emphysema Father        Social History     Socioeconomic History    Marital status:      Spouse name: Not on file    Number of children: Not on file    Years of education: Not on file    Highest education level: Not on file   Occupational History    Not on file   Tobacco Use    Smoking status: Never Smoker    Smokeless tobacco: Never Used   Substance and Sexual Activity    Alcohol use:  Yes     Alcohol/week: 3.3 standard drinks     Types: 4 Glasses of wine per week    Drug use: No    Sexual activity: Not on file   Other Topics Concern    Not on file   Social History Narrative    Not on file     Social Determinants of Health     Financial Resource Strain:     Difficulty of Paying Living Expenses: Not on file   Food Insecurity:     Worried About Running Out of Food in the Last Year: Not on file    Ran Out of Food in the Last Year: Not on file   Transportation Needs:     Lack of Transportation (Medical): Not on file    Lack of Transportation (Non-Medical): Not on file   Physical Activity:     Days of Exercise per Week: Not on file    Minutes of Exercise per Session: Not on file   Stress:     Feeling of Stress : Not on file   Social Connections:     Frequency of Communication with Friends and Family: Not on file    Frequency of Social Gatherings with Friends and Family: Not on file    Attends Jew Services: Not on file    Active Member of [address] [place] [address] [employer] or Organizations: Not on file    Attends Club or Organization Meetings: Not on file    Marital Status: Not on file   Intimate Partner Violence:     Fear of Current or Ex-Partner: Not on file    Emotionally Abused: Not on file    Physically Abused: Not on file    Sexually Abused: Not on file   Housing Stability:     Unable to Pay for Housing in the Last Year: Not on file    Number of [place] in the Last Year: Not on file    Unstable Housing in the Last Year: Not on file         ALLERGIES: Ace inhibitors and Percocet [oxycodone-acetaminophen]    Review of Systems   Constitutional: Negative for fever. HENT: Negative for sore throat. Eyes: Negative for visual disturbance. Respiratory: Negative for cough. Cardiovascular: Positive for leg swelling (right). Negative for chest pain. Gastrointestinal: Negative for abdominal pain. Genitourinary: Negative for dysuria. Musculoskeletal: Negative for back pain. Skin: Negative for rash. Neurological: Negative for headaches. Vitals:    06/08/22 1540   BP: (!) 178/73   Pulse: (!) 57   Resp: 16   Temp: 98 °F (36.7 °C)   SpO2: 98%            Physical Exam  Vitals and nursing note reviewed. Constitutional:       General: He is not in acute distress. Appearance: He is well-developed. HENT:      Head: Normocephalic and atraumatic. Eyes:      Conjunctiva/sclera: Conjunctivae normal.   Neck:      Trachea: Phonation normal.   Cardiovascular:      Rate and Rhythm: Normal rate. Pulmonary:      Effort: Pulmonary effort is normal. No respiratory distress. Abdominal:      General: There is no distension. Musculoskeletal:         General: No tenderness. Normal range of motion. Cervical back: Normal range of motion. Right lower leg: Edema present. Skin:     General: Skin is warm and dry. Neurological:      Mental Status: He is alert. He is not disoriented. Motor: No abnormal muscle tone. MDM         4:45 PM  Discussed with Joan NP with Dr. Thu Winslow. He is out of office today. Discussed with on call physician and Agrees with plan for eliquis and hold plavix. Patient to follow up with PCP.        6:01 PM  Discussed results and plan with patient, wife, caregiver and his daughter by phone. Agree with plan. Will sent starter pack prescription to 24 hour pharmacy and patient will take first dose today. Will stop taking plavix and follow-up with pcp.      Procedures

## 2023-09-19 NOTE — HOSPICE
Hospice Liaison Note:    Chart reviewed for updates in plan of care. Plan: Continue to provide Hospice support and education for patient/family. Will evaluate for Inpatient Hospice criteria. Please call Hospice team to offer support for patient, family or staff. Thank you for your coordination with the hospice plan of care. 11:35: Bedside assessment: Patient appears to be resting. No obvious distress. Pt has not required PRN dosing of symptom medications for comfort. Pt is not meeting [facility] LOC for inpatient hospice services. No family or visitors.     PLAN: Continue to evaluate patient; available to meet with family at request.      Tiana Bradford RN, [place] [address] Nurse Liaison  [phone] [place]  [phone] Office Cephalexin Counseling: I counseled the patient regarding use of cephalexin as an antibiotic for prophylactic and/or therapeutic purposes. Cephalexin (commonly prescribed under brand name Keflex) is a cephalosporin antibiotic which is active against numerous classes of bacteria, including most skin bacteria. Side effects may include nausea, diarrhea, gastrointestinal upset, rash, hives, yeast infections, and in rare cases, hepatitis, kidney disease, seizures, fever, confusion, neurologic symptoms, and others. Patients with severe allergies to penicillin medications are cautioned that there is about a 10% incidence of cross-reactivity with cephalosporins. When possible, patients with penicillin allergies should use alternatives to cephalosporins for antibiotic therapy.

## (undated) DEVICE — Z CONVERTED USE 2271043 CONTAINER SPEC COLL 4OZ SCR ON LID PEEL PCH

## (undated) DEVICE — GUIDEWIRE VASC STR 3 CM 0.014 INX190 CM HI TORQ WHISPER MS

## (undated) DEVICE — GOWN,PREVENTION PLUS,XLN/2XL,ST,22/CS: Brand: MEDLINE

## (undated) DEVICE — (D)PREP SKN CHLRAPRP APPL 26ML -- CONVERT TO ITEM 371833

## (undated) DEVICE — COVER LT HNDL PLAS RIG 1 PER PK

## (undated) DEVICE — PROVE COVER: Brand: UNBRANDED

## (undated) DEVICE — PACK,ORTOHMAX/CVMAX,UNIVERSAL,5/CS: Brand: MEDLINE

## (undated) DEVICE — WRAP SURG W1.31XL1.34M CARD FOR PT 165-172CM THERMOWRP

## (undated) DEVICE — 4-PORT MANIFOLD: Brand: NEPTUNE 2

## (undated) DEVICE — GAUZE SPONGES,12 PLY: Brand: CURITY

## (undated) DEVICE — SOLUTION IRRIG 1000ML STRL H2O USP PLAS POUR BTL

## (undated) DEVICE — SCRUB DRY SURG EZ SCRUB BRUSH PREOPERATIVE GRN

## (undated) DEVICE — SUTURE PERMAHAND SZ 2-0 L18IN NONABSORBABLE BLK L26MM SH C012D

## (undated) DEVICE — SYRINGE MED 20ML STD CLR PLAS LUERLOCK TIP N CTRL DISP

## (undated) DEVICE — GLOVE ORTHO 8   MSG9480

## (undated) DEVICE — DRAPE,EXTREMITY,89X128,STERILE: Brand: MEDLINE

## (undated) DEVICE — LIGHT HANDLE: Brand: DEVON

## (undated) DEVICE — TOWEL SURG W17XL27IN STD BLU COT NONFENESTRATED PREWASHED

## (undated) DEVICE — MAGNETIC INSTR DRAPE 20X16: Brand: MEDLINE INDUSTRIES, INC.

## (undated) DEVICE — MICROPUNCTURE INTRODUCER SET SILHOUETTE TRANSITIONLESS PUSH-PLUS DESIGN - STIFFENED CANNULA WITH STAINLESS STEEL WIRE GUIDE: Brand: MICROPUNCTURE

## (undated) DEVICE — Z DISCONTINUED USE 2744636  DRESSING AQUACEL 14 IN ALG W3.5XL14IN POLYUR FLM CVR W/ HYDRCOLL

## (undated) DEVICE — DEVICE INFL 20ML L13IN 30ATM CLR POLYCARB TB PRTBL DGT

## (undated) DEVICE — MICROPUNCTURE INTRODUCER SET SILHOUETTE TRANSITIONLESS WITH STAINLESS STEEL WIRE GUIDE: Brand: MICROPUNCTURE

## (undated) DEVICE — SUTURE VCRL SZ 2-0 L36IN ABSRB UD L40MM CT 1/2 CIR J957H

## (undated) DEVICE — DERMABOND SKIN ADH 0.7ML -- DERMABOND ADVANCED 12/BX

## (undated) DEVICE — INTENDED TO STANDARDIZE OR CAMERAS TO ALLOW FOR THE USE OF THE OR CAMERA COVER: Brand: ASPEN® O.R. CAMERA COVER

## (undated) DEVICE — 3M™ IOBAN™ 2 ANTIMICROBIAL INCISE DRAPE 6651EZ: Brand: IOBAN™ 2

## (undated) DEVICE — SUTURE MCRYL SZ 3-0 L27IN ABSRB UD L24MM PS-1 3/8 CIR PRIM Y936H

## (undated) DEVICE — SUTURE VCRL SZ 2-0 L27IN ABSRB UD L26MM SH 1/2 CIR J417H

## (undated) DEVICE — TUBING, SUCTION, 1/4" X 12', STRAIGHT: Brand: MEDLINE

## (undated) DEVICE — SPONGE GZ W4XL4IN COT 12 PLY TYP VII WVN C FLD DSGN

## (undated) DEVICE — T5 HOOD WITH PEEL AWAY FACE SHIELD

## (undated) DEVICE — CARTRIDGE BNE CEM MIX UNIV TWR VAC ROTOR BRK OFF NOZ W/O

## (undated) DEVICE — C-ARM: Brand: UNBRANDED

## (undated) DEVICE — FRAZIER SUCTION INSTRUMENT 12 FR W/CONTROL VENT & OBTURATOR: Brand: FRAZIER

## (undated) DEVICE — HYPODERMIC SAFETY NEEDLE: Brand: MONOJECT

## (undated) DEVICE — SOLUTION IRRIG 3000ML 0.9% SOD CHL FLX CONT 0797208] ICU MEDICAL INC]

## (undated) DEVICE — TRAY CATH W/ 16FR CATH URIN M CTRL FIT OUTLT TB F STATLOK

## (undated) DEVICE — SYR 10ML LUER LOK 1/5ML GRAD --

## (undated) DEVICE — STRYKER PERFORMANCE SERIES SAGITTAL BLADE: Brand: STRYKER PERFORMANCE SERIES

## (undated) DEVICE — Device

## (undated) DEVICE — VASCULAR-SMH: Brand: MEDLINE INDUSTRIES, INC.

## (undated) DEVICE — 40418 TRENDELENBURG ONE-STEP ARM PROTECTORS LARGE (1 PAIR): Brand: 40418 TRENDELENBURG ONE-STEP ARM PROTECTORS LARGE (1 PAIR)

## (undated) DEVICE — SUTURE VCRL 1 L27IN ABSRB CT BRAID COAT UD J281H

## (undated) DEVICE — PAD,NON-ADHERENT,3X8,STERILE,LF,1/PK: Brand: MEDLINE

## (undated) DEVICE — STERILE POLYISOPRENE POWDER-FREE SURGICAL GLOVES WITH EMOLLIENT COATING: Brand: PROTEXIS

## (undated) DEVICE — DRAPE,FEM ANGIO,2 WIN,STERILE: Brand: MEDLINE

## (undated) DEVICE — HOOK LOCK LATEX FREE ELASTIC BANDAGE D/L 6INX10YD

## (undated) DEVICE — STERILE POLYISOPRENE POWDER-FREE SURGICAL GLOVES: Brand: PROTEXIS

## (undated) DEVICE — CUSTOM CAST PD STR

## (undated) DEVICE — SUTURE VCRL SZ 3-0 L27IN ABSRB UD L26MM SH 1/2 CIR J416H

## (undated) DEVICE — SUTURE MCRYL SZ 4-0 L27IN ABSRB UD L19MM PS-2 1/2 CIR PRIM Y426H

## (undated) DEVICE — X-RAY SPONGES,16 PLY: Brand: DERMACEA

## (undated) DEVICE — SUTURE PROL SZ 6-0 L30IN NONABSORBABLE BLU L13MM RB-2 1/2 8711H

## (undated) DEVICE — REM POLYHESIVE ADULT PATIENT RETURN ELECTRODE: Brand: VALLEYLAB

## (undated) DEVICE — TAPE UMBILICAL W1/16XL30IN COTTON ROUND NONRADIOPAQUE

## (undated) DEVICE — SUT ETHLN 3-0 18IN PS2 BLK --

## (undated) DEVICE — SUTURE STRATAFIX SPRL SZ 1 L14IN ABSRB VLT L48CM CTX 1/2 SXPD2B405

## (undated) DEVICE — BLADE SAW W11.2XL77.5MM THK0.76MM CUT THK1.17MM REPL RECIP

## (undated) DEVICE — DEVON™ KNEE AND BODY STRAP 60" X 3" (1.5 M X 7.6 CM): Brand: DEVON

## (undated) DEVICE — TUBING HYDR IRR --

## (undated) DEVICE — SOL INJ SOD CL 0.9% 500ML BG --

## (undated) DEVICE — DRAIN SURG W10XL20CM SIL SMOOTH FLAT 3/4 PERF DBL WRP

## (undated) DEVICE — ZIMMER® STERILE DISPOSABLE TOURNIQUET CUFF WITH PLC, DUAL PORT, SINGLE BLADDER, 34 IN. (86 CM)

## (undated) DEVICE — SOLUTION IV 50ML 0.9% SOD CHL

## (undated) DEVICE — INFECTION CONTROL KIT SYS

## (undated) DEVICE — 3M™ TEGADERM™ TRANSPARENT FILM DRESSING FRAME STYLE, 1626W, 4 IN X 4-3/4 IN (10 CM X 12 CM), 50/CT 4CT/CASE: Brand: 3M™ TEGADERM™

## (undated) DEVICE — PREP SKN PREVAIL 40ML APPL --

## (undated) DEVICE — HANDPIECE SET WITH BONE CLEANING TIP AND SUCTION TUBE: Brand: INTERPULSE